# Patient Record
Sex: FEMALE | Race: WHITE | Employment: UNEMPLOYED | URBAN - METROPOLITAN AREA
[De-identification: names, ages, dates, MRNs, and addresses within clinical notes are randomized per-mention and may not be internally consistent; named-entity substitution may affect disease eponyms.]

---

## 2017-03-23 ENCOUNTER — HOSPITAL ENCOUNTER (INPATIENT)
Age: 27
LOS: 4 days | Discharge: HOME | DRG: 282 | End: 2017-03-30
Attending: EMERGENCY MEDICINE | Admitting: FAMILY MEDICINE
Payer: MEDICAID

## 2017-03-23 ENCOUNTER — APPOINTMENT (OUTPATIENT)
Dept: CT IMAGING | Age: 27
DRG: 282 | End: 2017-03-23
Attending: NURSE PRACTITIONER
Payer: MEDICAID

## 2017-03-23 DIAGNOSIS — K85.90 ACUTE PANCREATITIS, UNSPECIFIED COMPLICATION STATUS, UNSPECIFIED PANCREATITIS TYPE: Primary | ICD-10-CM

## 2017-03-23 DIAGNOSIS — R11.2 INTRACTABLE VOMITING WITH NAUSEA, UNSPECIFIED VOMITING TYPE: ICD-10-CM

## 2017-03-23 LAB
ALBUMIN SERPL BCP-MCNC: 4.6 G/DL (ref 3.4–5)
ALBUMIN/GLOB SERPL: 1.2 {RATIO} (ref 0.8–1.7)
ALP SERPL-CCNC: 137 U/L (ref 45–117)
ALT SERPL-CCNC: 27 U/L (ref 13–56)
AMORPH CRY URNS QL MICRO: ABNORMAL
ANION GAP BLD CALC-SCNC: 9 MMOL/L (ref 3–18)
APPEARANCE UR: ABNORMAL
AST SERPL W P-5'-P-CCNC: 16 U/L (ref 15–37)
BACTERIA URNS QL MICRO: ABNORMAL /HPF
BASOPHILS # BLD AUTO: 0 K/UL (ref 0–0.06)
BASOPHILS # BLD: 0 % (ref 0–2)
BILIRUB SERPL-MCNC: 0.4 MG/DL (ref 0.2–1)
BILIRUB UR QL: NEGATIVE
BUN SERPL-MCNC: 14 MG/DL (ref 7–18)
BUN/CREAT SERPL: 18 (ref 12–20)
CALCIUM SERPL-MCNC: 9.5 MG/DL (ref 8.5–10.1)
CHLORIDE SERPL-SCNC: 107 MMOL/L (ref 100–108)
CO2 SERPL-SCNC: 25 MMOL/L (ref 21–32)
COLOR UR: YELLOW
CREAT SERPL-MCNC: 0.79 MG/DL (ref 0.6–1.3)
DIFFERENTIAL METHOD BLD: ABNORMAL
EOSINOPHIL # BLD: 0 K/UL (ref 0–0.4)
EOSINOPHIL NFR BLD: 0 % (ref 0–5)
EPITH CASTS URNS QL MICRO: ABNORMAL /LPF (ref 0–5)
ERYTHROCYTE [DISTWIDTH] IN BLOOD BY AUTOMATED COUNT: 12.4 % (ref 11.6–14.5)
GLOBULIN SER CALC-MCNC: 3.9 G/DL (ref 2–4)
GLUCOSE SERPL-MCNC: 97 MG/DL (ref 74–99)
GLUCOSE UR STRIP.AUTO-MCNC: NEGATIVE MG/DL
HCG UR QL: NEGATIVE
HCT VFR BLD AUTO: 42.2 % (ref 35–45)
HGB BLD-MCNC: 14.6 G/DL (ref 12–16)
HGB UR QL STRIP: ABNORMAL
KETONES UR QL STRIP.AUTO: ABNORMAL MG/DL
LEUKOCYTE ESTERASE UR QL STRIP.AUTO: NEGATIVE
LIPASE SERPL-CCNC: 756 U/L (ref 73–393)
LYMPHOCYTES # BLD AUTO: 11 % (ref 21–52)
LYMPHOCYTES # BLD: 1.4 K/UL (ref 0.9–3.6)
MCH RBC QN AUTO: 32.7 PG (ref 24–34)
MCHC RBC AUTO-ENTMCNC: 34.6 G/DL (ref 31–37)
MCV RBC AUTO: 94.6 FL (ref 74–97)
MONOCYTES # BLD: 0.5 K/UL (ref 0.05–1.2)
MONOCYTES NFR BLD AUTO: 4 % (ref 3–10)
NEUTS SEG # BLD: 10.9 K/UL (ref 1.8–8)
NEUTS SEG NFR BLD AUTO: 85 % (ref 40–73)
NITRITE UR QL STRIP.AUTO: NEGATIVE
PH UR STRIP: 5.5 [PH] (ref 5–8)
PLATELET # BLD AUTO: 240 K/UL (ref 135–420)
PMV BLD AUTO: 10.4 FL (ref 9.2–11.8)
POTASSIUM SERPL-SCNC: 3.8 MMOL/L (ref 3.5–5.5)
PROT SERPL-MCNC: 8.5 G/DL (ref 6.4–8.2)
PROT UR STRIP-MCNC: 30 MG/DL
RBC # BLD AUTO: 4.46 M/UL (ref 4.2–5.3)
RBC #/AREA URNS HPF: ABNORMAL /HPF (ref 0–5)
SODIUM SERPL-SCNC: 141 MMOL/L (ref 136–145)
SP GR UR REFRACTOMETRY: 1.02 (ref 1–1.03)
UROBILINOGEN UR QL STRIP.AUTO: 0.2 EU/DL (ref 0.2–1)
WBC # BLD AUTO: 12.8 K/UL (ref 4.6–13.2)
WBC URNS QL MICRO: ABNORMAL /HPF (ref 0–4)

## 2017-03-23 PROCEDURE — 81025 URINE PREGNANCY TEST: CPT | Performed by: NURSE PRACTITIONER

## 2017-03-23 PROCEDURE — 83690 ASSAY OF LIPASE: CPT | Performed by: PHYSICIAN ASSISTANT

## 2017-03-23 PROCEDURE — 85025 COMPLETE CBC W/AUTO DIFF WBC: CPT | Performed by: PHYSICIAN ASSISTANT

## 2017-03-23 PROCEDURE — 81001 URINALYSIS AUTO W/SCOPE: CPT | Performed by: NURSE PRACTITIONER

## 2017-03-23 PROCEDURE — 74177 CT ABD & PELVIS W/CONTRAST: CPT

## 2017-03-23 PROCEDURE — 80053 COMPREHEN METABOLIC PANEL: CPT | Performed by: PHYSICIAN ASSISTANT

## 2017-03-23 PROCEDURE — 74011250636 HC RX REV CODE- 250/636: Performed by: PHYSICIAN ASSISTANT

## 2017-03-23 PROCEDURE — 74011250636 HC RX REV CODE- 250/636: Performed by: NURSE PRACTITIONER

## 2017-03-23 PROCEDURE — 96376 TX/PRO/DX INJ SAME DRUG ADON: CPT

## 2017-03-23 PROCEDURE — 96375 TX/PRO/DX INJ NEW DRUG ADDON: CPT

## 2017-03-23 PROCEDURE — 96361 HYDRATE IV INFUSION ADD-ON: CPT

## 2017-03-23 PROCEDURE — 99285 EMERGENCY DEPT VISIT HI MDM: CPT

## 2017-03-23 PROCEDURE — 96374 THER/PROPH/DIAG INJ IV PUSH: CPT

## 2017-03-23 PROCEDURE — 74011636320 HC RX REV CODE- 636/320: Performed by: EMERGENCY MEDICINE

## 2017-03-23 RX ORDER — KETOROLAC TROMETHAMINE 30 MG/ML
30 INJECTION, SOLUTION INTRAMUSCULAR; INTRAVENOUS
Status: COMPLETED | OUTPATIENT
Start: 2017-03-23 | End: 2017-03-23

## 2017-03-23 RX ORDER — ONDANSETRON 2 MG/ML
4 INJECTION INTRAMUSCULAR; INTRAVENOUS
Status: COMPLETED | OUTPATIENT
Start: 2017-03-23 | End: 2017-03-23

## 2017-03-23 RX ADMIN — IOPAMIDOL 100 ML: 612 INJECTION, SOLUTION INTRAVENOUS at 23:45

## 2017-03-23 RX ADMIN — ONDANSETRON HYDROCHLORIDE 4 MG: 2 SOLUTION INTRAMUSCULAR; INTRAVENOUS at 20:29

## 2017-03-23 RX ADMIN — KETOROLAC TROMETHAMINE 30 MG: 30 INJECTION, SOLUTION INTRAMUSCULAR at 21:34

## 2017-03-23 RX ADMIN — SODIUM CHLORIDE 1000 ML: 900 INJECTION, SOLUTION INTRAVENOUS at 20:29

## 2017-03-23 NOTE — IP AVS SNAPSHOT
Current Discharge Medication List  
  
START taking these medications Dose & Instructions Dispensing Information Comments Morning Noon Evening Bedtime  
 bisacodyl 10 mg suppository Commonly known as:  DULCOLAX Your last dose was: Your next dose is:    
   
   
 Dose:  10 mg Insert 10 mg into rectum daily as needed. Quantity:  30 Suppository Refills:  0  
     
   
   
   
  
 dicyclomine 10 mg capsule Commonly known as:  BENTYL Your last dose was: Your next dose is:    
   
   
 Dose:  10 mg Take 1 Cap by mouth four (4) times daily. Quantity:  90 Cap Refills:  0  
     
   
   
   
  
 docusate sodium 100 mg capsule Commonly known as:  Shasha Stallion Your last dose was: Your next dose is:    
   
   
 Dose:  100 mg Take 1 Cap by mouth two (2) times daily as needed for Constipation for up to 90 days. Quantity:  30 Cap Refills:  0  
     
   
   
   
  
 naloxegol 25 mg Tab tablet Commonly known as:  Edman Mery Your last dose was: Your next dose is:    
   
   
 Dose:  25 mg Take 1 Tab by mouth Daily (before breakfast). Quantity:  30 Tab Refills:  0  
     
   
   
   
  
 pantoprazole 40 mg tablet Commonly known as:  PROTONIX Your last dose was: Your next dose is:    
   
   
 Dose:  40 mg Take 1 Tab by mouth two (2) times a day. Quantity:  60 Tab Refills:  0  
     
   
   
   
  
 polyethylene glycol 17 gram packet Commonly known as:  Taisha Pile Your last dose was: Your next dose is:    
   
   
 Dose:  17 g Take 1 Packet by mouth three (3) times daily. Quantity:  90 Packet Refills:  0 CONTINUE these medications which have CHANGED Dose & Instructions Dispensing Information Comments Morning Noon Evening Bedtime HYDROcodone-acetaminophen 5-325 mg per tablet Commonly known as:  Energy Hurt What changed:   
- how much to take - how to take this - when to take this 
- reasons to take this 
- additional instructions - Another medication with the same name was removed. Continue taking this medication, and follow the directions you see here. Your last dose was: Your next dose is:    
   
   
 Dose:  1 Tab Take 1 Tab by mouth every six (6) hours as needed. Max Daily Amount: 4 Tabs. Quantity:  16 Tab Refills:  0 CONTINUE these medications which have NOT CHANGED Dose & Instructions Dispensing Information Comments Morning Noon Evening Bedtime  
 ondansetron hcl 4 mg tablet Commonly known as:  ZOFRAN (AS HYDROCHLORIDE) Your last dose was: Your next dose is:    
   
   
 Dose:  4 mg Take 1 tablet by mouth every eight (8) hours as needed for Nausea. Quantity:  12 tablet Refills:  0 PAXIL 30 mg tablet Generic drug:  PARoxetine Your last dose was: Your next dose is:    
   
   
 Dose:  30 mg Take 30 mg by mouth daily. Refills:  0 STOP taking these medications   
 methocarbamol 500 mg tablet Commonly known as:  ROBAXIN  
   
  
 ondansetron 4 mg disintegrating tablet Commonly known as:  ZOFRAN ODT  
   
  
 oxazepam 30 mg capsule Commonly known as:  SERAX Where to Get Your Medications These medications were sent to [address], [address] [place] [address] Phone:  [phone]  
  bisacodyl 10 mg suppository Information on where to get these meds will be given to you by the nurse or doctor. ! Ask your nurse or doctor about these medications  
  dicyclomine 10 mg capsule  
 docusate sodium 100 mg capsule HYDROcodone-acetaminophen 5-325 mg per tablet  
 naloxegol 25 mg Tab tablet  
 pantoprazole 40 mg tablet  
 polyethylene glycol 17 gram packet

## 2017-03-23 NOTE — IP AVS SNAPSHOT
Jarredon Flow 
 
 
 [address] Patient: James Smith MRN: [id number] TUA:[birth year] You are allergic to the following Allergen Reactions Latex, Natural Rubber Rash Doxycycline Hives Recent Documentation Height Weight Breastfeeding? BMI OB Status Smoking Status 1.422 m 52.2 kg No 25.78 kg/m2 Having regular periods Never Smoker Unresulted Labs Order Current Status PORPHOBILINOGEN DEAMINASE In process Emergency Contacts Name Discharge Info Relation Home Work Mobile Loreta Florez DISCHARGE CAREGIVER [3] Mother [14] [phone] About your hospitalization You were admitted on:  March 24, 2017 You last received care in the:  [facility] [address] You were discharged on:  March 30, 2017 Unit phone number:  [phone] Why you were hospitalized Your primary diagnosis was:  Not on File Your diagnoses also included:  Pancreatitis, Acute, Abdominal Pain, Acute, Generalized Providers Seen During Your Hospitalizations Provider Role Specialty Primary office phone Cherelle Guillen DO Attending Provider Emergency Medicine [phone] Roberto Garcia MD Attending Provider General acute hospital [phone] Your Primary Care Physician (PCP) Primary Care Physician Office Phone Office Fax NONE ** None ** ** None ** Follow-up Information Follow up With Details Comments Contact Info None   None (395) Patient stated that they have no PCP Roberto Garcia MD On 4/6/2017 Appointment at 11:20 am [address] [address] 
[phone] Mariya Knutson RN On 4/20/2017 Appointment at 10:20 am  at the [place] [address] 
[phone] Current Discharge Medication List  
  
 START taking these medications Dose & Instructions Dispensing Information Comments Morning Noon Evening Bedtime  
 bisacodyl 10 mg suppository Commonly known as:  DULCOLAX Your last dose was: Your next dose is:    
   
   
 Dose:  10 mg Insert 10 mg into rectum daily as needed. Quantity:  30 Suppository Refills:  0  
     
   
   
   
  
 dicyclomine 10 mg capsule Commonly known as:  BENTYL Your last dose was: Your next dose is:    
   
   
 Dose:  10 mg Take 1 Cap by mouth four (4) times daily. Quantity:  90 Cap Refills:  0  
     
   
   
   
  
 docusate sodium 100 mg capsule Commonly known as:  Gemma Batters Your last dose was: Your next dose is:    
   
   
 Dose:  100 mg Take 1 Cap by mouth two (2) times daily as needed for Constipation for up to 90 days. Quantity:  30 Cap Refills:  0  
     
   
   
   
  
 naloxegol 25 mg Tab tablet Commonly known as:  Maxcine Nephew Your last dose was: Your next dose is:    
   
   
 Dose:  25 mg Take 1 Tab by mouth Daily (before breakfast). Quantity:  30 Tab Refills:  0  
     
   
   
   
  
 pantoprazole 40 mg tablet Commonly known as:  PROTONIX Your last dose was: Your next dose is:    
   
   
 Dose:  40 mg Take 1 Tab by mouth two (2) times a day. Quantity:  60 Tab Refills:  0  
     
   
   
   
  
 polyethylene glycol 17 gram packet Commonly known as:  Ulysses Ro Your last dose was: Your next dose is:    
   
   
 Dose:  17 g Take 1 Packet by mouth three (3) times daily. Quantity:  90 Packet Refills:  0 CONTINUE these medications which have CHANGED Dose & Instructions Dispensing Information Comments Morning Noon Evening Bedtime HYDROcodone-acetaminophen 5-325 mg per tablet Commonly known as:  Zora Carson What changed:   
- how much to take 
- how to take this - when to take this - reasons to take this 
- additional instructions - Another medication with the same name was removed. Continue taking this medication, and follow the directions you see here. Your last dose was: Your next dose is:    
   
   
 Dose:  1 Tab Take 1 Tab by mouth every six (6) hours as needed. Max Daily Amount: 4 Tabs. Quantity:  16 Tab Refills:  0 CONTINUE these medications which have NOT CHANGED Dose & Instructions Dispensing Information Comments Morning Noon Evening Bedtime  
 ondansetron hcl 4 mg tablet Commonly known as:  ZOFRAN (AS HYDROCHLORIDE) Your last dose was: Your next dose is:    
   
   
 Dose:  4 mg Take 1 tablet by mouth every eight (8) hours as needed for Nausea. Quantity:  12 tablet Refills:  0 PAXIL 30 mg tablet Generic drug:  PARoxetine Your last dose was: Your next dose is:    
   
   
 Dose:  30 mg Take 30 mg by mouth daily. Refills:  0 STOP taking these medications   
 methocarbamol 500 mg tablet Commonly known as:  ROBAXIN  
   
  
 ondansetron 4 mg disintegrating tablet Commonly known as:  ZOFRAN ODT  
   
  
 oxazepam 30 mg capsule Commonly known as:  SERAX Where to Get Your Medications These medications were sent to [address] [place] [address], [address] [place] [address] Phone:  [phone]  
  bisacodyl 10 mg suppository Information on where to get these meds will be given to you by the nurse or doctor. ! Ask your nurse or doctor about these medications  
  dicyclomine 10 mg capsule  
 docusate sodium 100 mg capsule HYDROcodone-acetaminophen 5-325 mg per tablet  
 naloxegol 25 mg Tab tablet  
 pantoprazole 40 mg tablet  
 polyethylene glycol 17 gram packet Discharge Instructions None Discharge Orders None [employer] Announcement We are excited to announce that we are making your provider's discharge notes available to you in [employer]. You will see these notes when they are completed and signed by the physician that discharged you from your recent hospital stay. If you have any questions or concerns about any information you see in [employer], please call the Health Information Department where you were seen or reach out to your Primary Care Provider for more information about your plan of care. Introducing [facility]! [facility] introduces [employer] patient portal. Now you can access parts of your medical record, email your doctor's office, and request medication refills online. 1. In your internet browser, go to [email or web address] 2. Click on the First Time User? Click Here link in the Sign In box. You will see the New Member Sign Up page. 3. Enter your [employer] Access Code exactly as it appears below. You will not need to use this code after youve completed the sign-up process. If you do not sign up before the expiration date, you must request a new code. · [employer] Access Code: [email or web address] Expires: 5/31/2017  4:10 PM 
 
4. Enter the last four digits of your Social Security Number (xxxx) and Date of Birth (mm/dd/yyyy) as indicated and click Submit. You will be taken to the next sign-up page. 5. Create a [employer] ID. This will be your [employer] login ID and cannot be changed, so think of one that is secure and easy to remember. 6. Create a [employer] password. You can change your password at any time. 7. Enter your Password Reset Question and Answer. This can be used at a later time if you forget your password. 8. Enter your e-mail address. You will receive e-mail notification when new information is available in [address] 9. Click Sign Up. You can now view and download portions of your medical record. 10. Click the Download Summary menu link to download a portable copy of your medical information. If you have questions, please visit the Frequently Asked Questions section of the [employer] website. Remember, MyChart is NOT to be used for urgent needs. For medical emergencies, dial 911. Now available from your iPhone and Android! General Information Please provide this summary of care documentation to your next provider. Patient Signature:  ____________________________________________________________ Date:  ____________________________________________________________  
  
Bentley Miguel Provider Signature:  ____________________________________________________________ Date:  ____________________________________________________________

## 2017-03-23 NOTE — ED PROVIDER NOTES
HPI Comments: 1:26 PM Ane Record is a 23 y.o. female with a history of Bipolar 1 disorder who presents to the emergency department c/o nausea and vomiting x 20 since waking up this morning. Pt also c/o RLQ abdominal pain that radiates to the right flank. She states that she \"gets really hot then really cold. \" Pt also c/o vaginal spotting but states that it is because she is due for Depovera shot. She denies any dysuria, frequency, vaginal discharge, diarrhea, or fever associated with her symptoms. She explains that she has a hx of ovarian stones. Pt reports past surgival hx of Majo removal from abdomen and  section. She states that she is allergic to Doxycycline and Latex. Pt admits to occasional tobacco use and alcohol use. She denies any illicit drug use. The patient reports no other symptoms and has no other concerns at this time. PCP: Edilberto Escobedo MD    The history is provided by the patient. Past Medical History:   Diagnosis Date    Bipolar 1 disorder, depressed ([facility] [address]     Neurological disorder     Psychiatric disorder        No past surgical history on file. No family history on file. Social History     Social History    Marital status: SINGLE     Spouse name: N/A    Number of children: N/A    Years of education: N/A     Occupational History    Not on file. Social History Main Topics    Smoking status: Never Smoker    Smokeless tobacco: Not on file    Alcohol use 0.5 oz/week     1 drink(s) per week    Drug use: No    Sexual activity: Yes     Partners: Male     Birth control/ protection: None     Other Topics Concern    Not on file     Social History Narrative         ALLERGIES: Latex, natural rubber and Doxycycline    Review of Systems   Constitutional: Positive for activity change, appetite change and chills. Negative for fever. HENT: Negative. Negative for congestion, ear pain and sore throat. Respiratory: Negative.   Negative for cough, chest tightness, shortness of breath, wheezing and stridor. Cardiovascular: Negative. Negative for chest pain and palpitations. Gastrointestinal: Positive for abdominal pain (RLQ), nausea and vomiting (x 20). Negative for abdominal distention, constipation and diarrhea. Genitourinary: Positive for flank pain and vaginal bleeding (spotting). Negative for difficulty urinating, dysuria, frequency, hematuria, menstrual problem, pelvic pain, urgency and vaginal discharge. Musculoskeletal: Positive for back pain (lumbar). Neurological: Negative. Negative for dizziness, weakness and light-headedness. All other systems reviewed and are negative. Vitals:    03/23/17 1720   BP: 111/72   Pulse: 86   Resp: 16   Temp: 97.6 °F (36.4 °C)   SpO2: 99%   Weight: 52.2 kg (115 lb)   Height: 4' 8\" (1.422 m)            Physical Exam   Constitutional: She is oriented to person, place, and time. She appears well-developed and well-nourished. Pt uncomfortable   HENT:   Head: Normocephalic and atraumatic. Mouth/Throat: Oropharynx is clear and moist. No oropharyngeal exudate. Eyes: Conjunctivae and EOM are normal. Pupils are equal, round, and reactive to light. Neck: Normal range of motion. Neck supple. Cardiovascular: Normal rate, regular rhythm and normal heart sounds. Pulmonary/Chest: Effort normal and breath sounds normal. No respiratory distress. She has no wheezes. She has no rales. She exhibits no tenderness. Abdominal: Soft. Bowel sounds are normal. She exhibits no distension. There is tenderness. There is guarding. RLQ tenderness to direct palpation with guarding    After patient returned from CT- upon recheck, pt is having some LUQ pain now with mild tenderness   Musculoskeletal: Normal range of motion. She exhibits no edema, tenderness or deformity. Neurological: She is alert and oriented to person, place, and time. She exhibits normal muscle tone. Coordination normal.   Skin: Skin is warm and dry. She is not diaphoretic. Psychiatric: She has a normal mood and affect. Her behavior is normal. Thought content normal.   Nursing note and vitals reviewed. MDM  Number of Diagnoses or Management Options  Acute pancreatitis, unspecified complication status, unspecified pancreatitis type: Intractable vomiting with nausea, unspecified vomiting type:   Diagnosis management comments: Pt with elevated lipase-denies any history of previous pancreatitis or current upper abdominal pain. Mild elevated alk phos at 137 is chronic and all other LFTs normal. White count mildly elevated. Will CT pt to eval for appendicitis, uropathy, pancreatitis, CBD obstruction. Ct essentially normal. Neg Landry's sign. Impression-Pancreatitis    Will discuss admit with hospitalist due to 1st episode, unsuccessful fluid challenge,  lipase level, and vomiting\" more than 20 times today\". Dicussed admission with Dr. Flor Carson, patient previously established with Dr. Cathie Hammer, so admission deferred to Dr. Burgess Cerna who subsequently deferred by to Dr. Cathie Hammer who now admits for himself           Amount and/or Complexity of Data Reviewed  Clinical lab tests: ordered and reviewed  Tests in the radiology section of CPT®: ordered and reviewed    Patient Progress  Patient progress: stable    ED Course       Procedures                       Scribe Attestation:     IJessi, scribing for and in the presence of Kim Cuellar. Elton Osorio NP March 23, 2017 at 7:52 PM     Signed by: Elda Wu, March 23, 2017, 7:52 PM    Physician Attestation:   I personally performed the services described in this documentation, reviewed and edited the documentation which was dictated to the scribe in my presence, and it accurately records my words and actions. [facility]  Elton Osorio NP  March 23, 2017 at 7:52 PM

## 2017-03-23 NOTE — ED TRIAGE NOTES
States that she was out drinking with her friends and all day today she has been vomiting with abdominal pain and chills.

## 2017-03-24 ENCOUNTER — APPOINTMENT (OUTPATIENT)
Dept: ULTRASOUND IMAGING | Age: 27
DRG: 282 | End: 2017-03-24
Attending: FAMILY MEDICINE
Payer: MEDICAID

## 2017-03-24 ENCOUNTER — APPOINTMENT (OUTPATIENT)
Dept: GENERAL RADIOLOGY | Age: 27
DRG: 282 | End: 2017-03-24
Attending: FAMILY MEDICINE
Payer: MEDICAID

## 2017-03-24 PROBLEM — R10.84 ABDOMINAL PAIN, ACUTE, GENERALIZED: Status: ACTIVE | Noted: 2017-03-24

## 2017-03-24 PROBLEM — K85.90 PANCREATITIS, ACUTE: Status: ACTIVE | Noted: 2017-03-24

## 2017-03-24 LAB
AMPHET UR QL SCN: NEGATIVE
BARBITURATES UR QL SCN: NEGATIVE
BENZODIAZ UR QL: NEGATIVE
CANNABINOIDS UR QL SCN: POSITIVE
COCAINE UR QL SCN: NEGATIVE
HDSCOM,HDSCOM: ABNORMAL
METHADONE UR QL: NEGATIVE
OPIATES UR QL: POSITIVE
PCP UR QL: NEGATIVE
TRIGL SERPL-MCNC: 106 MG/DL (ref ?–150)

## 2017-03-24 PROCEDURE — 74011250637 HC RX REV CODE- 250/637: Performed by: FAMILY MEDICINE

## 2017-03-24 PROCEDURE — 71010 XR CHEST SNGL V: CPT

## 2017-03-24 PROCEDURE — C9113 INJ PANTOPRAZOLE SODIUM, VIA: HCPCS | Performed by: INTERNAL MEDICINE

## 2017-03-24 PROCEDURE — C9113 INJ PANTOPRAZOLE SODIUM, VIA: HCPCS | Performed by: FAMILY MEDICINE

## 2017-03-24 PROCEDURE — 99218 HC RM OBSERVATION: CPT

## 2017-03-24 PROCEDURE — 87491 CHLMYD TRACH DNA AMP PROBE: CPT | Performed by: FAMILY MEDICINE

## 2017-03-24 PROCEDURE — 84478 ASSAY OF TRIGLYCERIDES: CPT | Performed by: INTERNAL MEDICINE

## 2017-03-24 PROCEDURE — 74011250636 HC RX REV CODE- 250/636: Performed by: INTERNAL MEDICINE

## 2017-03-24 PROCEDURE — 36415 COLL VENOUS BLD VENIPUNCTURE: CPT | Performed by: FAMILY MEDICINE

## 2017-03-24 PROCEDURE — 74011250637 HC RX REV CODE- 250/637: Performed by: INTERNAL MEDICINE

## 2017-03-24 PROCEDURE — 80307 DRUG TEST PRSMV CHEM ANLYZR: CPT | Performed by: NURSE PRACTITIONER

## 2017-03-24 PROCEDURE — 84080 ASSAY ALKALINE PHOSPHATASES: CPT | Performed by: INTERNAL MEDICINE

## 2017-03-24 PROCEDURE — 76830 TRANSVAGINAL US NON-OB: CPT

## 2017-03-24 PROCEDURE — 82787 IGG 1 2 3 OR 4 EACH: CPT | Performed by: INTERNAL MEDICINE

## 2017-03-24 PROCEDURE — 74011250636 HC RX REV CODE- 250/636: Performed by: NURSE PRACTITIONER

## 2017-03-24 PROCEDURE — 87040 BLOOD CULTURE FOR BACTERIA: CPT | Performed by: FAMILY MEDICINE

## 2017-03-24 PROCEDURE — 74011250636 HC RX REV CODE- 250/636: Performed by: FAMILY MEDICINE

## 2017-03-24 PROCEDURE — 82977 ASSAY OF GGT: CPT | Performed by: INTERNAL MEDICINE

## 2017-03-24 PROCEDURE — 76705 ECHO EXAM OF ABDOMEN: CPT

## 2017-03-24 RX ORDER — FACIAL-BODY WIPES
10 EACH TOPICAL DAILY PRN
Status: DISCONTINUED | OUTPATIENT
Start: 2017-03-24 | End: 2017-03-30 | Stop reason: HOSPADM

## 2017-03-24 RX ORDER — ONDANSETRON 2 MG/ML
4 INJECTION INTRAMUSCULAR; INTRAVENOUS
Status: COMPLETED | OUTPATIENT
Start: 2017-03-24 | End: 2017-03-24

## 2017-03-24 RX ORDER — PANTOPRAZOLE SODIUM 40 MG/10ML
40 INJECTION, POWDER, LYOPHILIZED, FOR SOLUTION INTRAVENOUS DAILY
Status: DISCONTINUED | OUTPATIENT
Start: 2017-03-24 | End: 2017-03-24

## 2017-03-24 RX ORDER — DOCUSATE SODIUM 100 MG/1
100 CAPSULE, LIQUID FILLED ORAL 2 TIMES DAILY
Status: DISCONTINUED | OUTPATIENT
Start: 2017-03-24 | End: 2017-03-30 | Stop reason: HOSPADM

## 2017-03-24 RX ORDER — ONDANSETRON 2 MG/ML
4 INJECTION INTRAMUSCULAR; INTRAVENOUS
Status: DISCONTINUED | OUTPATIENT
Start: 2017-03-24 | End: 2017-03-30 | Stop reason: HOSPADM

## 2017-03-24 RX ORDER — MORPHINE SULFATE 2 MG/ML
2 INJECTION, SOLUTION INTRAMUSCULAR; INTRAVENOUS
Status: DISCONTINUED | OUTPATIENT
Start: 2017-03-24 | End: 2017-03-25

## 2017-03-24 RX ORDER — PANTOPRAZOLE SODIUM 40 MG/10ML
40 INJECTION, POWDER, LYOPHILIZED, FOR SOLUTION INTRAVENOUS EVERY 12 HOURS
Status: DISCONTINUED | OUTPATIENT
Start: 2017-03-24 | End: 2017-03-30 | Stop reason: HOSPADM

## 2017-03-24 RX ORDER — MORPHINE SULFATE 4 MG/ML
4 INJECTION, SOLUTION INTRAMUSCULAR; INTRAVENOUS
Status: COMPLETED | OUTPATIENT
Start: 2017-03-24 | End: 2017-03-24

## 2017-03-24 RX ORDER — POLYETHYLENE GLYCOL 3350 17 G/17G
17 POWDER, FOR SOLUTION ORAL 3 TIMES DAILY
Status: DISCONTINUED | OUTPATIENT
Start: 2017-03-24 | End: 2017-03-30 | Stop reason: HOSPADM

## 2017-03-24 RX ORDER — SODIUM CHLORIDE 9 MG/ML
75 INJECTION, SOLUTION INTRAVENOUS CONTINUOUS
Status: DISCONTINUED | OUTPATIENT
Start: 2017-03-24 | End: 2017-03-30

## 2017-03-24 RX ORDER — ENOXAPARIN SODIUM 100 MG/ML
40 INJECTION SUBCUTANEOUS EVERY 24 HOURS
Status: DISCONTINUED | OUTPATIENT
Start: 2017-03-24 | End: 2017-03-30 | Stop reason: HOSPADM

## 2017-03-24 RX ADMIN — Medication 2 MG: at 11:19

## 2017-03-24 RX ADMIN — Medication 2 MG: at 19:07

## 2017-03-24 RX ADMIN — SODIUM CHLORIDE 125 ML/HR: 900 INJECTION, SOLUTION INTRAVENOUS at 03:07

## 2017-03-24 RX ADMIN — ONDANSETRON HYDROCHLORIDE 4 MG: 2 SOLUTION INTRAMUSCULAR; INTRAVENOUS at 01:46

## 2017-03-24 RX ADMIN — NALOXEGOL OXALATE 25 MG: 25 TABLET, FILM COATED ORAL at 11:17

## 2017-03-24 RX ADMIN — DOCUSATE SODIUM 100 MG: 100 CAPSULE, LIQUID FILLED ORAL at 19:06

## 2017-03-24 RX ADMIN — Medication 4 MG: at 01:46

## 2017-03-24 RX ADMIN — SODIUM CHLORIDE 125 ML/HR: 900 INJECTION, SOLUTION INTRAVENOUS at 22:57

## 2017-03-24 RX ADMIN — PANTOPRAZOLE SODIUM 40 MG: 40 INJECTION, POWDER, FOR SOLUTION INTRAVENOUS at 11:17

## 2017-03-24 RX ADMIN — SODIUM CHLORIDE 125 ML/HR: 900 INJECTION, SOLUTION INTRAVENOUS at 15:20

## 2017-03-24 RX ADMIN — PANTOPRAZOLE SODIUM 40 MG: 40 INJECTION, POWDER, FOR SOLUTION INTRAVENOUS at 22:58

## 2017-03-24 RX ADMIN — ONDANSETRON HYDROCHLORIDE 4 MG: 2 SOLUTION INTRAMUSCULAR; INTRAVENOUS at 04:23

## 2017-03-24 RX ADMIN — Medication 4 MG: at 04:23

## 2017-03-24 RX ADMIN — Medication 2 MG: at 15:21

## 2017-03-24 RX ADMIN — ONDANSETRON HYDROCHLORIDE 4 MG: 2 SOLUTION INTRAMUSCULAR; INTRAVENOUS at 11:19

## 2017-03-24 RX ADMIN — Medication 2 MG: at 23:35

## 2017-03-24 RX ADMIN — ENOXAPARIN SODIUM 40 MG: 40 INJECTION SUBCUTANEOUS at 11:18

## 2017-03-24 RX ADMIN — DOCUSATE SODIUM 100 MG: 100 CAPSULE, LIQUID FILLED ORAL at 11:19

## 2017-03-24 RX ADMIN — POLYETHYLENE GLYCOL 3350 17 G: 17 POWDER, FOR SOLUTION ORAL at 19:06

## 2017-03-24 NOTE — PROGRESS NOTES
Care Management Interventions  Current Support Network: Other (Pt currently resides at a hotel with her family.)  Confirm Follow Up Transport: Family  Plan discussed with Pt/Family/Caregiver: Yes     Pt is a 23year old female admitted for pancreatitis, intractable vomiting. Pt is alert and oriented and alone in room. Pt states that currently her and family resides at a hotel at [address] until April 1st when they will be moving into a home at [address] Pt's plan is to return to hotel at discharge and her mother will be assisting her with transportation. Pt indicates being fully independent with ADLs and ambulation prior to admission.

## 2017-03-24 NOTE — ED NOTES
Hourly rounding-Pt resting on stretcher, side rails up, call bell in reach, vitals stable, pt updated on plan of care, pt sleeping when i entered room, stated her pain was still 8/10 when asked.

## 2017-03-24 NOTE — ED NOTES
Received report from [address], RN, pt moved from fast track to bed 5. Pt alert and oriented, c/o abdominal pain 8/10, pt states she has been vomiting since yesterday, denies any other symptoms.

## 2017-03-24 NOTE — ED NOTES
Hourly rounding-Pt resting on stretcher, side rails up, call bell in reach, vitals stable, pt updated on plan of care, no issues or complaints at this time.  Pt ambulated to bathroom without difficulty

## 2017-03-24 NOTE — INTERDISCIPLINARY ROUNDS
Interdisciplinary Round Note   Patient Information: Patient Information: Cris Richards                                      [birth year]   Reason for Admission: pancreatitis, intractable vomiting   Attending Provider:   Cooper Osei MD  Primary Care Physician:       None       None   Past Medical History:   Past Medical History:   Diagnosis Date    Bipolar [facility] disorder, depressed [facility] [address]     Neurological disorder     Psychiatric disorder       Hospital day: 0  Estimated discharge date: 3/25  RRAT Score: Low Risk            8       Total Score        4 More than 1 Admission in calendar year    4 Patient Insurance is Medicare, Medicaid or Self Pay        Criteria that do not apply:    Relationship with PCP    Patient Living Status    Patient Length of Stay > 5    Charlson Comorbidity Score       Goals for Today: pain control      Overnight Events:n/a     VITAL SIGNS  Vitals:    03/24/17 0400 03/24/17 0500 03/24/17 0600 03/24/17 0751   BP: 139/70 114/61 100/60 95/57   Pulse:    75   Resp:    18   Temp:    97.7 °F (36.5 °C)   SpO2: 100% 100% 98% 93%   Weight:       Height:              Lines, Drains, & Airways    Peripheral IV 03/23/17 Right Antecubital-Site Assessment: Clean, dry, & intact       VTE Prophylaxis                                   Intake and Output:        Current Diet: DIET CLEAR LIQUID       Abdominal   Abdominal Assessment: Flat, Intact, Nausea, Tender, Semi-soft, Vomiting  Appetite: NPO  Bowel Sounds: Active   GI Prophylaxis: yes        Type: n/a        Recent Glucose Results:   Lab Results   Component Value Date/Time    GLU 97 03/23/2017 07:20 PM          IV Antibiotics? n/a       When started: n/a   Activity Level: Activity Level: Head of bed elevated (degrees)  Needs assistance with ADLs: no  PT Consult Status: n/a Current Immunizations: There is no immunization history on file for this patient.        Recommendations:   Discharge Disposition: Home Independent    Needs for Discharge: n/a Recommendations from IDR team: n/a    Other Notes: n/a

## 2017-03-24 NOTE — ROUTINE PROCESS
7:38; Received pt. A&O x 4. Pt C/O some nausea, no vomiting. Bowel sounds active. Abdomen soft a little distended     States she is tolerating her oral intake well. no distress at this time.

## 2017-03-24 NOTE — ED NOTES
TRANSFER - OUT REPORT:    Verbal report given to [address], RN(name) on Umer Nair  being transferred to (unit) for routine progression of care       Report consisted of patients Situation, Background, Assessment and   Recommendations(SBAR). Information from the following report(s) SBAR and ED Summary was reviewed with the receiving nurse. Lines:   Peripheral IV 03/23/17 Right Antecubital (Active)   Site Assessment Clean, dry, & intact 3/23/2017 10:36 PM   Phlebitis Assessment 0 3/23/2017 10:36 PM   Infiltration Assessment 0 3/23/2017 10:36 PM   Dressing Status Clean, dry, & intact 3/23/2017 10:36 PM   Dressing Type Transparent 3/23/2017 10:36 PM   Hub Color/Line Status Patent; Flushed 3/23/2017 10:36 PM        Opportunity for questions and clarification was provided.       Patient transported with:   [employer]

## 2017-03-24 NOTE — ROUTINE PROCESS
Bedside and Verbal shift change report given to [address] (oncoming nurse) by Carlin Godinez (offgoing nurse). Report included the following information SBAR, Kardex, OR Summary, Procedure Summary, Intake/Output, MAR, Recent Results and Med Rec Status.

## 2017-03-24 NOTE — H&P
History & Physical    Patient: Tammy Cedillo MRN: [id number]  CSN: [id number]    YOB: 1990  Age: 23 y.o. Sex: female      DOA: 3/23/2017    Chief Complaint:   Chief Complaint   Patient presents with    Abdominal Pain    Nausea    Vomiting          HPI:     Tammy Cedillo is a 23 y.o.  female who has h/o poor compliant last seen at my office 3 years ago . Pt presented to ER c/o nausea and vomiting x 20 since waking up this morning. Pt also c/o RLQ,  abdominal pain that radiates to the right flank. Pt also has Lt upper quadrant pain      Pt also c/o vaginal spotting but states that it is because she is due for Depovera shot. She has been f/[facility]     She denies any dysuria, frequency, vaginal discharge, diarrhea, or fever associated with her symptoms. Pt has surgival hx of Morina removal from abdomen and  section. She states that she is allergic to Doxycycline and Latex. Pt admits to occasional tobacco use and alcohol use. she reported she had one hard Drink 2 days ago     She denies any illicit drug use. The patient reports no other symptoms and has no other concerns at this time. CT scan abdomen and pelvis  Moderate amount of retained stool in proximal and mid colon.     Normal appendix identified.     No definable acute processes in abdomen or in pelvis.           Past Medical History:   Diagnosis Date    Bipolar 1 disorder, depressed ([facility] [address]     Neurological disorder     Psychiatric disorder        History reviewed. No pertinent surgical history. History reviewed. No pertinent family history.     Social History     Social History    Marital status: SINGLE     Spouse name: N/A    Number of children: N/A    Years of education: N/A     Social History Main Topics    Smoking status: Never Smoker    Smokeless tobacco: None    Alcohol use 0.5 oz/week     1 drink(s) per week    Drug use: No    Sexual activity: Yes     Partners: Male     Birth control/ protection: None     Other Topics Concern    None     Social History Narrative       Prior to Admission medications    Medication Sig Start Date End Date Taking? Authorizing Provider   HYDROcodone-acetaminophen (NORCO) 5-325 mg per tablet Take 1 Tab by mouth every four (4) hours as needed for Pain. Max Daily Amount: 6 Tabs. 10/23/15   SHANDA Damon   HYDROcodone-acetaminophen (NORCO) 5-325 mg per tablet Take 1-2 tablets PO every 4-6 hours as needed for pain control. If over the counter ibuprofen or acetaminophen was suggested, then only take the vicodin for pain not well controlled with the over the counter medication. 7/30/15   SHANDA Damon   methocarbamol (ROBAXIN) 500 mg tablet Take 1 Tab by mouth four (4) times daily. 7/30/15   SHANDA Murcia   ondansetron (ZOFRAN ODT) 4 mg disintegrating tablet 1-2 tabs every 6-8 hours prn 7/30/15   SHANDA Murcia   ondansetron hcl (ZOFRAN, AS HYDROCHLORIDE,) 4 mg tablet Take 1 tablet by mouth every eight (8) hours as needed for Nausea. 10/8/14   SHANDA Sharma   oxazepam (SERAX) 30 mg capsule Take 30 mg by mouth nightly as needed. Zeb Mena MD   PARoxetine (PAXIL) 30 mg tablet Take 30 mg by mouth daily. Zeb Mena MD       Allergies   Allergen Reactions    Latex, Natural Rubber Rash    Doxycycline Hives       Review of Systems  GENERAL: Patient alert, awake and oriented times 3, able to communicate full sentences and not in distress. HEENT: No change in vision, no earache, tinnitus, sore throat or sinus congestion. NECK: No pain or stiffness. CARDIOVASCULAR: No chest pain or pressure. No palpitations. PULMONARY: No shortness of breath, cough or wheeze. GASTROINTESTINAL: positive  abdominal pain, positive  nausea, vomiting no  diarrhea, melena or       bright red blood per rectum. GENITOURINARY: No urinary frequency, urgency, hesitancy or dysuria.  MUSCULOSKELETAL: No joint or muscle pain, positive  back pain, no recent trauma. DERMATOLOGIC: No rash, no itching, no lesions. ENDOCRINE: No polyuria, polydipsia, no heat or cold intolerance. No recent change in    weight. HEMATOLOGICAL: No anemia or easy bruising or bleeding. NEUROLOGIC: No headache, seizures, numbness, tingling or weakness. PSYCHIATRIC: No depression, anxiety, mood disorder, no loss of interest in normal       activity or change in sleep pattern. Physical Exam:     Physical Exam:  Visit Vitals    BP 95/57 (BP 1 Location: Left arm, BP Patient Position: At rest)    Pulse 75    Temp 97.7 °F (36.5 °C)    Resp 18    Ht 4' 8\" (1.422 m)    Wt 52.2 kg (115 lb)    SpO2 93%    BMI 25.78 kg/m2      O2 Device: Room air    Temp (24hrs), Av °F (36.7 °C), Min:97.6 °F (36.4 °C), Max:98.8 °F (37.1 °C)             General:  Alert, cooperative, no distress, appears stated age. Head:  Normocephalic, without obvious abnormality, atraumatic. Eyes:  Conjunctivae/corneas clear. PERRL, EOMs intact. Nose: Nares normal. No drainage or sinus tenderness. Throat: Lips, mucosa, and tongue dry   Neck: Supple, symmetrical, trachea midline, no adenopathy, thyroid: no enlargement/tenderness/nodules, no carotid bruit and no JVD. Back:   ROM normal. No CVA tenderness. Lungs:   Clear to auscultation bilaterally. Chest wall:  No tenderness or deformity. Heart:  Regular rate and rhythm, S1, S2 normal, no murmur, click, rub or gallop. Abdomen: Soft, tender Lt upper quadrant epigastric and Rt lower quadrant . Bowel sounds normal. No masses,  No organomegaly. Extremities: Extremities normal, atraumatic, no cyanosis or edema. Pulses: 2+ and symmetric all extremities. Skin: Skin color, texture, turgor normal. No rashes or lesions   Neurologic: CNII-XII intact. No focal motor or sensory deficit. Labs Reviewed: All lab results for the last 24 hours reviewed.   CT    Procedures/imaging: see electronic medical records for all procedures/Xrays and details which were not copied into this note but were reviewed prior to creation of Plan        Assessment/Plan     Active Problems:    Pancreatitis, acute (3/24/2017)      Abdominal pain, acute, generalized (3/24/2017)       Plan  Will admit for observation  Clear liquid diet  IV hydration with NS  Morphin 2 mg IV q 4h prn  Zofran 4 mg IV q 6h prn  Colace 100 mg BID  Movantik 25 mg daily  Dulcolax supp 10 mg supp daily prn    will check liver ultrasound and f/u GI consult  Check EKG, CXR UDS pending from ER  Will get urine GC/CT   Pelvic Ultrasound, repeat cbc, cmp, mag  tsh, lipase in AM  Blood culture x2    DVT/GI Prophylaxis: Lovenox, SCD's and H2B/PPI      Mauro Oneal MD  3/24/2017  7:56 AM

## 2017-03-24 NOTE — CONSULTS
[email or web address]  [phone]    Impression:   1. Acute pancreatitis -- suspect alcohol related, did admit to heavy ETOH use chronically  2. Chronic abdominal pain  3. Recurrent nausea and vomiting -- pancreatitis vs PUD vs biliary source vs marijuana hyperemesis syndrom  4. Abnormal GI imaging - transient small bowel dilation on prior CT scans  5. Chronic constipation   6. Family history of gallstones and \"bile duct problems\"  7. Pyrosis      Plan:     1. Serology for triglycerides, NATALIA, IgG4, alk phos isoenzymes and GGT  2. Await read on gallbladder ultrasound  3. Advance diet as tolerated  4. Consider small bowel series and/or MRCP as outpatient given prior imaging and family history  5. Continue IVF hydration until tolerating adequate oral intake  6. Analgesic and antiemetic therapy  7. Miralax TID      Chief Complaint: acute pancreatitis      HPI:  Abhinav Montero is a 23 y.o. female who is being seen on consult for several days of worsening abdominal pain, nausea and vomiting with elevated lipase in ED. Pt reports years of chronic abdominal pain of unknown etiology and recurrent nausea and vomiting, typically occurring after drinking alcohol, which she admits to consuming heavily on occasion. She denies any prior episode of pancreatitis. CT in ED with moderate retained stool, no signs of pancreatitis. Prior CT from 9/2013 with mildly dilated proximal small bowel loops without transition zone. Subsequent CT without contrast in 6/2014 with proximal jejunal thickening and mid-jejunal fluid and dilation, however repeat CT with contrast did not show any persistent abnormality. She has a long history of constipation with up to 7 days between bowel movements. She had an EGD and colonoscopy at age 16, per mom, which was reportedly normal. Her mother and grandmother have both had cholecystectomy for gallstones and \"bile duct problems. \"    PMH:   Past Medical History:   Diagnosis Date    Bipolar 1 disorder, depressed ([facility] [address]     Neurological disorder     Psychiatric disorder        PSH:   History reviewed. No pertinent surgical history. Social HX:   Social History     Social History    Marital status: SINGLE     Spouse name: N/A    Number of children: N/A    Years of education: N/A     Occupational History    Not on file. Social History Main Topics    Smoking status: Never Smoker    Smokeless tobacco: Not on file    Alcohol use 0.5 oz/week     1 drink(s) per week    Drug use: No    Sexual activity: Yes     Partners: Male     Birth control/ protection: None     Other Topics Concern    Not on file     Social History Narrative       FHX:   History reviewed. No pertinent family history. Allergy:   Allergies   Allergen Reactions    Latex, Natural Rubber Rash    Doxycycline Hives       Home Medications:     Prescriptions Prior to Admission   Medication Sig    ondansetron (ZOFRAN ODT) 4 mg disintegrating tablet 1-2 tabs every 6-8 hours prn    ondansetron hcl (ZOFRAN, AS HYDROCHLORIDE,) 4 mg tablet Take 1 tablet by mouth every eight (8) hours as needed for Nausea.  HYDROcodone-acetaminophen (NORCO) 5-325 mg per tablet Take 1 Tab by mouth every four (4) hours as needed for Pain. Max Daily Amount: 6 Tabs.  HYDROcodone-acetaminophen (NORCO) 5-325 mg per tablet Take 1-2 tablets PO every 4-6 hours as needed for pain control. If over the counter ibuprofen or acetaminophen was suggested, then only take the vicodin for pain not well controlled with the over the counter medication.  methocarbamol (ROBAXIN) 500 mg tablet Take 1 Tab by mouth four (4) times daily.  oxazepam (SERAX) 30 mg capsule Take 30 mg by mouth nightly as needed.  PARoxetine (PAXIL) 30 mg tablet Take 30 mg by mouth daily. Review of Systems:     A fourteen point review of systems was obtained, and was negative except per HPI.     Visit Vitals    /80 (BP 1 Location: Left arm, BP Patient Position: Supine)    Pulse 74    Temp 98.4 °F (36.9 °C)    Resp 18    Ht 4' 8\" (1.422 m)    Wt 52.2 kg (115 lb)    SpO2 99%    Breastfeeding No    BMI 25.78 kg/m2       Physical Assessment:     constitutional: appearance: well developed, well nourished, in no acute distress. skin: no rashes, jaundice  eyes: inspection: normal conjunctivae and lids; no scleral icterus pupils: equal, round, reactive to light; extraocular movements intact  ENMT: mouth: mucous membranes moist, no thrush  neck:  no masses or tenderness; normal range of motion  respiratory: breath sounds clear, no wheeze/rales/rhonchi  cardiovascular: normal rate, regular rhythm without murmur  abdominal: soft, bowel sounds present, non-tender to palpation without rebound or guarding; no appreciable mass; no appreciable hepatosplenomegaly; no appreciable fluid wave  extremities: no clubbing, cyanosis, edema  neurologic:  Cranial nerves II-XII grossly intact; no asterixis   psychiatric:  oriented to time, space and person. Basic Metabolic Profile   Recent Labs      03/23/17 1920   NA  141   K  3.8   CL  107   CO2  25   BUN  14   GLU  97   CA  9.5         CBC w/Diff    Recent Labs      03/23/17 1920   WBC  12.8   RBC  4.46   HGB  14.6   HCT  42.2   MCV  94.6   MCH  32.7   MCHC  34.6   RDW  12.4   PLT  240    Recent Labs      03/23/17 1920   GRANS  85*   LYMPH  11*   EOS  0        Hepatic Function   Recent Labs      03/23/17 1920   ALB  4.6   TP  8.5*   TBILI  0.4   SGOT  16   AP  137*   LPSE  756*          Elier Parekh MD  [facility] [address] - [phone]  [email or web address]. [facility]

## 2017-03-25 LAB
ALBUMIN SERPL BCP-MCNC: 3.2 G/DL (ref 3.4–5)
ALBUMIN/GLOB SERPL: 1.3 {RATIO} (ref 0.8–1.7)
ALP SERPL-CCNC: 96 U/L (ref 45–117)
ALT SERPL-CCNC: 18 U/L (ref 13–56)
ANION GAP BLD CALC-SCNC: 9 MMOL/L (ref 3–18)
AST SERPL W P-5'-P-CCNC: 14 U/L (ref 15–37)
BASOPHILS # BLD AUTO: 0 K/UL (ref 0–0.1)
BASOPHILS # BLD: 0 % (ref 0–2)
BILIRUB SERPL-MCNC: 0.5 MG/DL (ref 0.2–1)
BUN SERPL-MCNC: 5 MG/DL (ref 7–18)
BUN/CREAT SERPL: 8 (ref 12–20)
CALCIUM SERPL-MCNC: 8.1 MG/DL (ref 8.5–10.1)
CHLORIDE SERPL-SCNC: 106 MMOL/L (ref 100–108)
CO2 SERPL-SCNC: 26 MMOL/L (ref 21–32)
CREAT SERPL-MCNC: 0.65 MG/DL (ref 0.6–1.3)
DIFFERENTIAL METHOD BLD: ABNORMAL
EOSINOPHIL # BLD: 0 K/UL (ref 0–0.4)
EOSINOPHIL NFR BLD: 1 % (ref 0–5)
ERYTHROCYTE [DISTWIDTH] IN BLOOD BY AUTOMATED COUNT: 12.2 % (ref 11.6–14.5)
GLOBULIN SER CALC-MCNC: 2.5 G/DL (ref 2–4)
GLUCOSE SERPL-MCNC: 69 MG/DL (ref 74–99)
HCT VFR BLD AUTO: 34.9 % (ref 35–45)
HGB BLD-MCNC: 11.7 G/DL (ref 12–16)
LIPASE SERPL-CCNC: 194 U/L (ref 73–393)
LYMPHOCYTES # BLD AUTO: 47 % (ref 21–52)
LYMPHOCYTES # BLD: 3.2 K/UL (ref 0.9–3.6)
MAGNESIUM SERPL-MCNC: 1.9 MG/DL (ref 1.8–2.4)
MCH RBC QN AUTO: 32 PG (ref 24–34)
MCHC RBC AUTO-ENTMCNC: 33.5 G/DL (ref 31–37)
MCV RBC AUTO: 95.4 FL (ref 74–97)
MONOCYTES # BLD: 0.5 K/UL (ref 0.05–1.2)
MONOCYTES NFR BLD AUTO: 7 % (ref 3–10)
NEUTS SEG # BLD: 3 K/UL (ref 1.8–8)
NEUTS SEG NFR BLD AUTO: 45 % (ref 40–73)
PLATELET # BLD AUTO: 183 K/UL (ref 135–420)
PMV BLD AUTO: 10.8 FL (ref 9.2–11.8)
POTASSIUM SERPL-SCNC: 3.3 MMOL/L (ref 3.5–5.5)
PROT SERPL-MCNC: 5.7 G/DL (ref 6.4–8.2)
RBC # BLD AUTO: 3.66 M/UL (ref 4.2–5.3)
SODIUM SERPL-SCNC: 141 MMOL/L (ref 136–145)
TSH SERPL DL<=0.05 MIU/L-ACNC: 1.58 UIU/ML (ref 0.36–3.74)
WBC # BLD AUTO: 6.7 K/UL (ref 4.6–13.2)

## 2017-03-25 PROCEDURE — 74011250636 HC RX REV CODE- 250/636: Performed by: INTERNAL MEDICINE

## 2017-03-25 PROCEDURE — 85025 COMPLETE CBC W/AUTO DIFF WBC: CPT | Performed by: FAMILY MEDICINE

## 2017-03-25 PROCEDURE — 86038 ANTINUCLEAR ANTIBODIES: CPT | Performed by: INTERNAL MEDICINE

## 2017-03-25 PROCEDURE — 80053 COMPREHEN METABOLIC PANEL: CPT | Performed by: INTERNAL MEDICINE

## 2017-03-25 PROCEDURE — 74011250637 HC RX REV CODE- 250/637: Performed by: INTERNAL MEDICINE

## 2017-03-25 PROCEDURE — 84443 ASSAY THYROID STIM HORMONE: CPT | Performed by: FAMILY MEDICINE

## 2017-03-25 PROCEDURE — 74011250636 HC RX REV CODE- 250/636: Performed by: FAMILY MEDICINE

## 2017-03-25 PROCEDURE — 74011250636 HC RX REV CODE- 250/636: Performed by: NURSE PRACTITIONER

## 2017-03-25 PROCEDURE — 83735 ASSAY OF MAGNESIUM: CPT | Performed by: INTERNAL MEDICINE

## 2017-03-25 PROCEDURE — 99218 HC RM OBSERVATION: CPT

## 2017-03-25 PROCEDURE — 83690 ASSAY OF LIPASE: CPT | Performed by: INTERNAL MEDICINE

## 2017-03-25 PROCEDURE — 74011250637 HC RX REV CODE- 250/637: Performed by: FAMILY MEDICINE

## 2017-03-25 PROCEDURE — C9113 INJ PANTOPRAZOLE SODIUM, VIA: HCPCS | Performed by: INTERNAL MEDICINE

## 2017-03-25 PROCEDURE — 36415 COLL VENOUS BLD VENIPUNCTURE: CPT | Performed by: INTERNAL MEDICINE

## 2017-03-25 RX ORDER — POTASSIUM CHLORIDE 1.5 G/1.77G
40 POWDER, FOR SOLUTION ORAL
Status: COMPLETED | OUTPATIENT
Start: 2017-03-25 | End: 2017-03-25

## 2017-03-25 RX ORDER — MORPHINE SULFATE 2 MG/ML
1 INJECTION, SOLUTION INTRAMUSCULAR; INTRAVENOUS
Status: DISCONTINUED | OUTPATIENT
Start: 2017-03-25 | End: 2017-03-26

## 2017-03-25 RX ADMIN — ONDANSETRON HYDROCHLORIDE 4 MG: 2 SOLUTION INTRAMUSCULAR; INTRAVENOUS at 16:15

## 2017-03-25 RX ADMIN — PANTOPRAZOLE SODIUM 40 MG: 40 INJECTION, POWDER, FOR SOLUTION INTRAVENOUS at 10:42

## 2017-03-25 RX ADMIN — ONDANSETRON HYDROCHLORIDE 4 MG: 2 SOLUTION INTRAMUSCULAR; INTRAVENOUS at 21:37

## 2017-03-25 RX ADMIN — SODIUM CHLORIDE 125 ML/HR: 900 INJECTION, SOLUTION INTRAVENOUS at 21:00

## 2017-03-25 RX ADMIN — POTASSIUM CHLORIDE 40 MEQ: 1.5 POWDER, FOR SOLUTION ORAL at 16:14

## 2017-03-25 RX ADMIN — DOCUSATE SODIUM 100 MG: 100 CAPSULE, LIQUID FILLED ORAL at 10:42

## 2017-03-25 RX ADMIN — PANTOPRAZOLE SODIUM 40 MG: 40 INJECTION, POWDER, FOR SOLUTION INTRAVENOUS at 21:27

## 2017-03-25 RX ADMIN — SODIUM CHLORIDE 125 ML/HR: 900 INJECTION, SOLUTION INTRAVENOUS at 18:40

## 2017-03-25 RX ADMIN — Medication 2 MG: at 10:42

## 2017-03-25 RX ADMIN — POLYETHYLENE GLYCOL 3350 17 G: 17 POWDER, FOR SOLUTION ORAL at 16:14

## 2017-03-25 RX ADMIN — Medication 1 MG: at 16:15

## 2017-03-25 RX ADMIN — POLYETHYLENE GLYCOL 3350 17 G: 17 POWDER, FOR SOLUTION ORAL at 10:41

## 2017-03-25 RX ADMIN — Medication 1 MG: at 20:18

## 2017-03-25 RX ADMIN — SODIUM CHLORIDE 125 ML/HR: 900 INJECTION, SOLUTION INTRAVENOUS at 06:12

## 2017-03-25 RX ADMIN — ENOXAPARIN SODIUM 40 MG: 40 INJECTION SUBCUTANEOUS at 10:41

## 2017-03-25 RX ADMIN — Medication 2 MG: at 05:08

## 2017-03-25 RX ADMIN — NALOXEGOL OXALATE 25 MG: 25 TABLET, FILM COATED ORAL at 10:42

## 2017-03-25 RX ADMIN — DOCUSATE SODIUM 100 MG: 100 CAPSULE, LIQUID FILLED ORAL at 18:43

## 2017-03-25 NOTE — ROUTINE PROCESS
Received pt. A&O x 4. Pt denies any N/V. Bowel sounds  active     States she is tolerating her oral intake well.     pain OF 9\10. Pain med given no  distress at this time.

## 2017-03-25 NOTE — PROGRESS NOTES
[place]  [facility], [address]    Progress Note    Patient: Rigoberto Luis MRN: [id number]  SSN: [id number]    YOB: 1990  Age: 23 y.o. Sex: female      Admit Date: 3/23/2017    LOS: 0 days     Chief complaint:abd pain     Impression:     1. Acute pancreatitis, lipase now normal-- suspect alcohol related, did admit to heavy ETOH use chronically on adm but denies now. Neg US for biliary source and TGY normal .    2. Chronic abdominal pain possibly due to constipation   3. Recurrent nausea and vomiting -- pancreatitis vs PUD vs biliary source vs marijuana hyperemesis syndrome  4. Abnormal GI imaging - transient small bowel dilation on prior CT scans very non specific may want to consider a SBFT as out pt if abd pains persist   5. Chronic constipation   6. Drug screen + for opiates and MJ. Is patient on chronic pain management? High risk for opioid abuse in my opinion . 7. Hx of bipolar disorder    Plan:     1. Advance diet as tolerated   2. Avoid narcotic use long term   3. miralax long term as out pt   4. Awaiting IGG subtype 4 and NATALIA but suspect they will be negative     Will see on Monday call for any questions in the interim pgr [phone]      Subjective: The patient problems have remained about the same . The pt notes nausea and same level of abd pain as on admission even though her lipase is now normal .  Tolerating liquids .       Objective:     Vitals:    03/24/17 2358 03/25/17 0400 03/25/17 0418 03/25/17 0846   BP: 129/73 120/76 109/75 128/68   Pulse: 64 (!) 56 62 60   Resp: 18 18 18 18   Temp: 98.5 °F (36.9 °C) 98.7 °F (37.1 °C) 99 °F (37.2 °C) 98.9 °F (37.2 °C)   SpO2: 100% 100% 100% 98%   Weight:       Height:            Physical Exam:   GENERAL: fatigued, cooperative, mild distress, appears stated age  ABDOMEN: scaphoid tender     Lab/Data Review:  BMP:   Lab Results   Component Value Date/Time     03/25/2017 03:10 AM    K 3.3 (L) 03/25/2017 03:10 AM     03/25/2017 03:10 AM    CO2 26 03/25/2017 03:10 AM    AGAP 9 03/25/2017 03:10 AM    GLU 69 (L) 03/25/2017 03:10 AM    BUN 5 (L) 03/25/2017 03:10 AM    CREA 0.65 03/25/2017 03:10 AM    GFRAA >60 03/25/2017 03:10 AM    GFRNA >60 03/25/2017 03:10 AM     CMP:   Lab Results   Component Value Date/Time     03/25/2017 03:10 AM    K 3.3 (L) 03/25/2017 03:10 AM     03/25/2017 03:10 AM    CO2 26 03/25/2017 03:10 AM    AGAP 9 03/25/2017 03:10 AM    GLU 69 (L) 03/25/2017 03:10 AM    BUN 5 (L) 03/25/2017 03:10 AM    CREA 0.65 03/25/2017 03:10 AM    GFRAA >60 03/25/2017 03:10 AM    GFRNA >60 03/25/2017 03:10 AM    CA 8.1 (L) 03/25/2017 03:10 AM    MG 1.9 03/25/2017 03:10 AM    ALB 3.2 (L) 03/25/2017 03:10 AM    TP 5.7 (L) 03/25/2017 03:10 AM    GLOB 2.5 03/25/2017 03:10 AM    AGRAT 1.3 03/25/2017 03:10 AM    SGOT 14 (L) 03/25/2017 03:10 AM    ALT 18 03/25/2017 03:10 AM     CBC:   Lab Results   Component Value Date/Time    WBC 6.7 03/25/2017 03:10 AM    HGB 11.7 (L) 03/25/2017 03:10 AM    HCT 34.9 (L) 03/25/2017 03:10 AM     03/25/2017 03:10 AM     COAGS: No results found for: APTT, PTP, INR  Liver Panel:   Lab Results   Component Value Date/Time    ALB 3.2 (L) 03/25/2017 03:10 AM    TP 5.7 (L) 03/25/2017 03:10 AM    GLOB 2.5 03/25/2017 03:10 AM    AGRAT 1.3 03/25/2017 03:10 AM    SGOT 14 (L) 03/25/2017 03:10 AM    ALT 18 03/25/2017 03:10 AM    AP 96 03/25/2017 03:10 AM          Active Problems:    Pancreatitis, acute (3/24/2017)      Abdominal pain, acute, generalized (3/24/2017)          Signed By: Shira Causey MD     March 25, 2017

## 2017-03-25 NOTE — ROUTINE PROCESS
Bedside shift change report given to [address] [place] [address] (oncoming nurse) by Corinna Ordonez (offgoing nurse). Report given with SBAR, Kardex, Intake/Output, MAR and Recent Results.

## 2017-03-25 NOTE — PROGRESS NOTES
Progress Note    Patient: Gustavo Rodriguez MRN: [id number]  CSN: [id number]    YOB: 1990  Age: 23 y.o. Sex: female    DOA: 3/23/2017 LOS:  LOS: 0 days                    Subjective:     Pt still c/o abdominal pain. couldn't tolerate solid food , no vomiting still has no BM discussed with pt importance to take stool softener and have BM. Urine drug screen positive THC and opiates. Blood culture negative   GC/CT urine culture is pending. Potassium slightly low 3.3  CXR negative, EKG is pending. Hospital course  Gustavo Rodriguez is a 23 y.o.  female who has h/o poor compliant last seen at my office 3 years ago . Pt presented to ER c/o nausea and vomiting x 20 since waking up this morning. Pt also c/o RLQ,  abdominal pain that radiates to the right flank. Pt also has Lt upper quadrant pain      Pt also c/o vaginal spotting but states that it is because she is due for Depovera shot. She has been f/u women care at [address] [place] [address]  She denies any dysuria, frequency, vaginal discharge, diarrhea, or fever associated with her symptoms. Pt has surgival hx of Morina removal from abdomen and  section.      She states that she is allergic to Doxycycline and Latex. Pt admits to occasional tobacco use and alcohol use. she reported she had one hard Drink 2 days ago     She denies any illicit drug use. The patient reports no other symptoms and has no other concerns at this time.      CT scan abdomen and pelvis  Moderate amount of retained stool in proximal and mid colon.      Normal appendix identified.      No definable acute processes in abdomen or in pelvis.          Vaginal U/S  1. Normal uterus and endometrial stripe   2. Normal ovaries. 3. Small amount of free fluid in the cul-de-sac.     CXR  Negative study.      EKG pending  U/S gallbladder pending    Chief Complaint:   Chief Complaint   Patient presents with    Abdominal Pain    Nausea    Vomiting       Review of systems  GENERAL: Patient alert, awake and oriented times 3, able to communicate full sentences and not in distress. HEENT: No change in vision, no earache, tinnitus, sore throat or sinus congestion. NECK: No pain or stiffness. CARDIOVASCULAR: No chest pain or pressure. No palpitations. PULMONARY: No shortness of breath, cough or wheeze. GASTROINTESTINAL: positive  abdominal pain, positive  nausea, vomiting no  diarrhea, melena or bright red blood per rectum. GENITOURINARY: No urinary frequency, urgency, hesitancy or dysuria. MUSCULOSKELETAL: No joint or muscle pain, positive  back pain, no recent trauma. DERMATOLOGIC: No rash, no itching, no lesions. ENDOCRINE: No polyuria, polydipsia, no heat or cold intolerance. No recent change in weight. HEMATOLOGICAL: No anemia or easy bruising or bleeding. NEUROLOGIC: No headache, seizures, numbness, tingling or weakness. PSYCHIATRIC: No depression, anxiety, H/O Bipolar, no loss of interest in normal activity or change in sleep pattern    Objective:     Physical Exam:  Visit Vitals    /70 (BP 1 Location: Left arm, BP Patient Position: At rest)    Pulse 64    Temp 98 °F (36.7 °C)    Resp 18    Ht 4' 8\" (1.422 m)    Wt 52.2 kg (115 lb)    SpO2 97%    Breastfeeding No    BMI 25.78 kg/m2      General:  Alert, cooperative, no distress, appears stated age. Head:  Normocephalic, without obvious abnormality, atraumatic. Eyes:  Conjunctivae/corneas clear. PERRL, EOMs intact. Nose: Nares normal. No drainage or sinus tenderness. Throat: Lips, mucosa, and tongue dry   Neck: Supple, symmetrical, trachea midline, no adenopathy, thyroid: no enlargement/tenderness/nodules, no carotid bruit and no JVD. Back:  ROM normal. No CVA tenderness. Lungs:  Clear to auscultation bilaterally. Chest wall:  No tenderness or deformity. Heart:  Regular rate and rhythm, S1, S2 normal, no murmur, click, rub or gallop. Abdomen: Soft, tender Lt upper quadrant epigastric tenderness . Bowel sounds normal. No masses, No organomegaly. Extremities: Extremities normal, atraumatic, no cyanosis or edema. Pulses: 2+ and symmetric all extremities. Skin: Skin color, texture, turgor normal. No rashes or lesions   Neurologic: CNII-XII intact. No focal motor or sensory deficit. Intake and Output:  Current Shift:  03/25 0701 - 03/25 1900  In: -   Out: 800 [Urine:800]  Last three shifts:  03/23 1901 - 03/25 0700  In: 1980 [P.O.:480; I.V.:1500]  Out: 100 [Urine:100]    Labs: Results:       Chemistry Recent Labs      03/25/17 0310 03/23/17 1920   GLU  69*  97   NA  141  141   K  3.3*  3.8   CL  106  107   CO2  26  25   BUN  5*  14   CREA  0.65  0.79   CA  8.1*  9.5   AGAP  9  9   BUCR  8*  18   AP  96  137*   TP  5.7*  8.5*   ALB  3.2*  4.6   GLOB  2.5  3.9   AGRAT  1.3  1.2      CBC w/Diff Recent Labs      03/25/17 0310 03/23/17 1920   WBC  6.7  12.8   RBC  3.66*  4.46   HGB  11.7*  14.6   HCT  34.9*  42.2   PLT  183  240   GRANS  45  85*   LYMPH  47  11*   EOS  1  0      Cardiac Enzymes No results for input(s): CPK, CKND1, GILLES in the last 72 hours. No lab exists for component: CKRMB, TROIP   Coagulation No results for input(s): PTP, INR, APTT in the last 72 hours. No lab exists for component: INREXT    Lipid Panel Lab Results   Component Value Date/Time    Triglyceride 106 03/24/2017 03:55 PM      BNP No results for input(s): BNPP in the last 72 hours.    Liver Enzymes Recent Labs      03/25/17 0310   TP  5.7*   ALB  3.2*   AP  96   SGOT  14*      Thyroid Studies Lab Results   Component Value Date/Time    TSH 1.58 03/25/2017 03:10 AM          Procedures/imaging: see electronic medical records for all procedures/Xrays and details which were not copied into this note but were reviewed prior to creation of Plan    Medications:   Current Facility-Administered Medications   Medication Dose Route Frequency    0.9% sodium chloride infusion  125 mL/hr IntraVENous CONTINUOUS    docusate sodium (COLACE) capsule 100 mg  100 mg Oral BID    naloxegol (MOVANTIK) tablet 25 mg  25 mg Oral ACB    morphine injection 2 mg  2 mg IntraVENous Q4H PRN    bisacodyl (DULCOLAX) suppository 10 mg  10 mg Rectal DAILY PRN    enoxaparin (LOVENOX) injection 40 mg  40 mg SubCUTAneous Q24H    ondansetron (ZOFRAN) injection 4 mg  4 mg IntraVENous Q6H PRN    pantoprazole (PROTONIX) injection 40 mg  40 mg IntraVENous Q12H    polyethylene glycol (MIRALAX) packet 17 g  17 g Oral TID       Assessment/Plan     Active Problems:    Pancreatitis, acute (3/24/2017)      Abdominal pain, acute, generalized (3/24/2017)    Plan    Abdominal pain/ pancreatitis  Lipase back to normal  Continue IV hydration  Check ultrasound gallbladder  F/u GC/CT culture  F/U IGG , NATALIA    Constipation  Movantik  25 mg daily  Colace 100 mg BID  christina ax 17 gm daily    Bipolar D/O  F/u psychiatry as outpatinet    Continue to monitor lab  Potassium 40 waldo x1 today  Discussed with Dr Norbert Franco. Might need magnesium citrate will wait until tomorrow monitor for response to Movantik . i will decrease morphine to 1 mg q4h prn monitor sx .   Continue zofran prn    DVT and GI prophylaxis Lovenox and protonix    Nisha Wright MD  3/25/2017 12:32 PM

## 2017-03-26 LAB
ALBUMIN SERPL BCP-MCNC: 3.7 G/DL (ref 3.4–5)
ALBUMIN/GLOB SERPL: 1.1 {RATIO} (ref 0.8–1.7)
ALP SERPL-CCNC: 103 U/L (ref 45–117)
ALT SERPL-CCNC: 20 U/L (ref 13–56)
ANION GAP BLD CALC-SCNC: 11 MMOL/L (ref 3–18)
AST SERPL W P-5'-P-CCNC: 13 U/L (ref 15–37)
BASOPHILS # BLD AUTO: 0 K/UL (ref 0–0.1)
BASOPHILS # BLD: 0 % (ref 0–2)
BILIRUB SERPL-MCNC: 0.3 MG/DL (ref 0.2–1)
BUN SERPL-MCNC: 6 MG/DL (ref 7–18)
BUN/CREAT SERPL: 7 (ref 12–20)
C TRACH RRNA SPEC QL NAA+PROBE: NEGATIVE
CALCIUM SERPL-MCNC: 8.4 MG/DL (ref 8.5–10.1)
CHLORIDE SERPL-SCNC: 106 MMOL/L (ref 100–108)
CO2 SERPL-SCNC: 24 MMOL/L (ref 21–32)
CREAT SERPL-MCNC: 0.86 MG/DL (ref 0.6–1.3)
DIFFERENTIAL METHOD BLD: ABNORMAL
EOSINOPHIL # BLD: 0 K/UL (ref 0–0.4)
EOSINOPHIL NFR BLD: 1 % (ref 0–5)
ERYTHROCYTE [DISTWIDTH] IN BLOOD BY AUTOMATED COUNT: 12 % (ref 11.6–14.5)
GGT SERPL-CCNC: 72 IU/L (ref 0–60)
GLOBULIN SER CALC-MCNC: 3.3 G/DL (ref 2–4)
GLUCOSE SERPL-MCNC: 117 MG/DL (ref 74–99)
HCT VFR BLD AUTO: 38.2 % (ref 35–45)
HGB BLD-MCNC: 13.1 G/DL (ref 12–16)
LYMPHOCYTES # BLD AUTO: 46 % (ref 21–52)
LYMPHOCYTES # BLD: 2.7 K/UL (ref 0.9–3.6)
MAGNESIUM SERPL-MCNC: 2.2 MG/DL (ref 1.8–2.4)
MCH RBC QN AUTO: 32.3 PG (ref 24–34)
MCHC RBC AUTO-ENTMCNC: 34.3 G/DL (ref 31–37)
MCV RBC AUTO: 94.1 FL (ref 74–97)
MONOCYTES # BLD: 0.7 K/UL (ref 0.05–1.2)
MONOCYTES NFR BLD AUTO: 11 % (ref 3–10)
N GONORRHOEA RRNA SPEC QL NAA+PROBE: NEGATIVE
NEUTS SEG # BLD: 2.5 K/UL (ref 1.8–8)
NEUTS SEG NFR BLD AUTO: 42 % (ref 40–73)
PLATELET # BLD AUTO: 204 K/UL (ref 135–420)
PMV BLD AUTO: 10.8 FL (ref 9.2–11.8)
POTASSIUM SERPL-SCNC: 3.7 MMOL/L (ref 3.5–5.5)
PROT SERPL-MCNC: 7 G/DL (ref 6.4–8.2)
RBC # BLD AUTO: 4.06 M/UL (ref 4.2–5.3)
SODIUM SERPL-SCNC: 141 MMOL/L (ref 136–145)
SPECIMEN SOURCE: NORMAL
WBC # BLD AUTO: 5.9 K/UL (ref 4.6–13.2)

## 2017-03-26 PROCEDURE — 83735 ASSAY OF MAGNESIUM: CPT | Performed by: FAMILY MEDICINE

## 2017-03-26 PROCEDURE — 65270000029 HC RM PRIVATE

## 2017-03-26 PROCEDURE — 74011250637 HC RX REV CODE- 250/637: Performed by: INTERNAL MEDICINE

## 2017-03-26 PROCEDURE — 99218 HC RM OBSERVATION: CPT

## 2017-03-26 PROCEDURE — 74011250636 HC RX REV CODE- 250/636: Performed by: INTERNAL MEDICINE

## 2017-03-26 PROCEDURE — 74011250637 HC RX REV CODE- 250/637: Performed by: FAMILY MEDICINE

## 2017-03-26 PROCEDURE — 80053 COMPREHEN METABOLIC PANEL: CPT | Performed by: FAMILY MEDICINE

## 2017-03-26 PROCEDURE — C9113 INJ PANTOPRAZOLE SODIUM, VIA: HCPCS | Performed by: INTERNAL MEDICINE

## 2017-03-26 PROCEDURE — 36415 COLL VENOUS BLD VENIPUNCTURE: CPT | Performed by: FAMILY MEDICINE

## 2017-03-26 PROCEDURE — 74011250636 HC RX REV CODE- 250/636: Performed by: FAMILY MEDICINE

## 2017-03-26 PROCEDURE — 85025 COMPLETE CBC W/AUTO DIFF WBC: CPT | Performed by: FAMILY MEDICINE

## 2017-03-26 RX ORDER — MORPHINE SULFATE 2 MG/ML
0.5 INJECTION, SOLUTION INTRAMUSCULAR; INTRAVENOUS
Status: DISCONTINUED | OUTPATIENT
Start: 2017-03-26 | End: 2017-03-27

## 2017-03-26 RX ORDER — MAGNESIUM CITRATE
296 SOLUTION, ORAL ORAL
Status: COMPLETED | OUTPATIENT
Start: 2017-03-26 | End: 2017-03-26

## 2017-03-26 RX ORDER — ONDANSETRON 2 MG/ML
4 INJECTION INTRAMUSCULAR; INTRAVENOUS ONCE
Status: COMPLETED | OUTPATIENT
Start: 2017-03-26 | End: 2017-03-26

## 2017-03-26 RX ORDER — ASPIRIN 325 MG
TABLET ORAL
Status: DISCONTINUED
Start: 2017-03-26 | End: 2017-03-26

## 2017-03-26 RX ADMIN — PANTOPRAZOLE SODIUM 40 MG: 40 INJECTION, POWDER, FOR SOLUTION INTRAVENOUS at 20:41

## 2017-03-26 RX ADMIN — PANTOPRAZOLE SODIUM 40 MG: 40 INJECTION, POWDER, FOR SOLUTION INTRAVENOUS at 08:17

## 2017-03-26 RX ADMIN — POLYETHYLENE GLYCOL 3350 17 G: 17 POWDER, FOR SOLUTION ORAL at 08:15

## 2017-03-26 RX ADMIN — Medication 1 MG: at 10:05

## 2017-03-26 RX ADMIN — Medication 0.5 MG: at 20:40

## 2017-03-26 RX ADMIN — DOCUSATE SODIUM 100 MG: 100 CAPSULE, LIQUID FILLED ORAL at 17:50

## 2017-03-26 RX ADMIN — Medication 1 MG: at 01:01

## 2017-03-26 RX ADMIN — MAGNESIUM CITRATE 296 ML: 1.75 LIQUID ORAL at 02:25

## 2017-03-26 RX ADMIN — POLYETHYLENE GLYCOL 3350 17 G: 17 POWDER, FOR SOLUTION ORAL at 22:56

## 2017-03-26 RX ADMIN — POLYETHYLENE GLYCOL 3350 17 G: 17 POWDER, FOR SOLUTION ORAL at 15:13

## 2017-03-26 RX ADMIN — ONDANSETRON HYDROCHLORIDE 4 MG: 2 SOLUTION INTRAMUSCULAR; INTRAVENOUS at 15:13

## 2017-03-26 RX ADMIN — ONDANSETRON 4 MG: 2 INJECTION INTRAMUSCULAR; INTRAVENOUS at 06:20

## 2017-03-26 RX ADMIN — Medication 0.5 MG: at 15:04

## 2017-03-26 RX ADMIN — NALOXEGOL OXALATE 25 MG: 25 TABLET, FILM COATED ORAL at 08:16

## 2017-03-26 RX ADMIN — DOCUSATE SODIUM 100 MG: 100 CAPSULE, LIQUID FILLED ORAL at 08:16

## 2017-03-26 RX ADMIN — Medication 1 MG: at 06:20

## 2017-03-26 RX ADMIN — ENOXAPARIN SODIUM 40 MG: 40 INJECTION SUBCUTANEOUS at 08:16

## 2017-03-26 NOTE — PROGRESS NOTES
Bedside shift change report given to Chrissy RN (oncoming nurse) by Tonny Francisco (offgoing nurse). Report included in the following information SBAR, Kardex, ED Summary, OR Summary, Procedure Summary, Intake/Output, MAR and Recent Results.

## 2017-03-26 NOTE — PROGRESS NOTES
Patient is alert and oriented x 4, lungs clear bilaterally, bowel sounds active in all quadrants, patient denies any acute distress,SOB and chest pain. Patient is tolerating regular food without difficulties, no complain of nausea or vomiting. Patient ambulate independently, voiding without difficulties, no apparent distress noted, no neurological deficit noted, placed call bell within reach, will continue to monitor.

## 2017-03-26 NOTE — ROUTINE PROCESS
Bedside and Verbal shift change report given to [address] (oncoming nurse) by Nikkie Souza (offgoing nurse). Report included the following information SBAR, Kardex, Procedure Summary, Intake/Output, MAR and Recent Results.

## 2017-03-26 NOTE — PROGRESS NOTES
03/25/17 1839   Patient Observation   Observations pt ambulated to the bathroom and back  resting in bed, states pain is bearable at this time. denies any N/V. pt tolerating her oral intake well. No BM, but passing flatus. states miralax does not work for her. med given. no s/s of distress.

## 2017-03-26 NOTE — PROGRESS NOTES
Progress Note    Patient: Cris Richards MRN: [id number]  CSN: [id number]    YOB: 1990  Age: 23 y.o. Sex: female    DOA: 3/23/2017 LOS:  LOS: 0 days                    Subjective:     Pt still c/o abdominal pain. Lt upper quadrant had mag citrate this morning discussed with nursing staff she had small BM will give dulcolax prn    Urine drug screen positive THC and opiates. Blood culture negative   GC/CT urine culture is negative . Potassium normal today . CXR negative,     Hospital course  Cris Richards is a 23 y.o.  female who has h/o poor compliant last seen at my office 3 years ago . Pt presented to ER c/o nausea and vomiting x 20 since waking up this morning. Pt also c/o RLQ,  abdominal pain that radiates to the right flank. Pt also has Lt upper quadrant pain      Pt also c/o vaginal spotting but states that it is because she is due for Depovera shot. She has been f/u women care at [address] [place] [address] Avenue  She denies any dysuria, frequency, vaginal discharge, diarrhea, or fever associated with her symptoms. Pt has surgival hx of Morina removal from abdomen and  section.      She states that she is allergic to Doxycycline and Latex. Pt admits to occasional tobacco use and alcohol use. she reported she had one hard Drink 2 days ago     She denies any illicit drug use. The patient reports no other symptoms and has no other concerns at this time.      CT scan abdomen and pelvis  Moderate amount of retained stool in proximal and mid colon.      Normal appendix identified.      No definable acute processes in abdomen or in pelvis.          Vaginal U/S  1. Normal uterus and endometrial stripe   2. Normal ovaries. 3. Small amount of free fluid in the cul-de-sac. CXR  Negative study.      EKG pending  U/S gallbladder    No diagnostic abnormality as described.     Chief Complaint:   Chief Complaint   Patient presents with    Abdominal Pain    Nausea    Vomiting       Review of systems  GENERAL: Patient alert, awake and oriented times 3, able to communicate full sentences and not in distress. HEENT: No change in vision, no earache, tinnitus, sore throat or sinus congestion. NECK: No pain or stiffness. CARDIOVASCULAR: No chest pain or pressure. No palpitations. PULMONARY: No shortness of breath, cough or wheeze. GASTROINTESTINAL: positive  abdominal pain, positive  nausea,no vomiting no  diarrhea, melena or bright red blood per rectum. positive constipation  GENITOURINARY: No urinary frequency, urgency, hesitancy or dysuria. MUSCULOSKELETAL: No joint or muscle pain, positive  back pain, no recent trauma. DERMATOLOGIC: No rash, no itching, no lesions. ENDOCRINE: No polyuria, polydipsia, no heat or cold intolerance. No recent change in weight. HEMATOLOGICAL: No anemia or easy bruising or bleeding. NEUROLOGIC: No headache, seizures, numbness, tingling or weakness. PSYCHIATRIC: No depression, anxiety, H/O Bipolar, no loss of interest in normal activity or change in sleep pattern    Objective:     Physical Exam:  Visit Vitals    /73 (BP 1 Location: Left arm, BP Patient Position: At rest;Supine)    Pulse 60    Temp 99 °F (37.2 °C)    Resp 17    Ht 4' 8\" (1.422 m)    Wt 52.2 kg (115 lb)    SpO2 100%    Breastfeeding No    BMI 25.78 kg/m2      General:  Alert, cooperative, no distress, appears stated age. Head:  Normocephalic, without obvious abnormality, atraumatic. Eyes:  Conjunctivae/corneas clear. PERRL, EOMs intact. Nose: Nares normal. No drainage or sinus tenderness. Throat: Lips, mucosa, and tongue dry   Neck: Supple, symmetrical, trachea midline, no adenopathy, thyroid: no enlargement/tenderness/nodules, no carotid bruit and no JVD. Back:  ROM normal. No CVA tenderness. Lungs:  Clear to auscultation bilaterally. Chest wall:  No tenderness or deformity. Heart:  Regular rate and rhythm, S1, S2 normal, no murmur, click, rub or gallop.    Abdomen: Soft, tender Lt upper quadrant epigastric tenderness . Bowel sounds normal. No masses, No organomegaly. Extremities: Extremities normal, atraumatic, no cyanosis or edema. Pulses: 2+ and symmetric all extremities. Skin: Skin color, texture, turgor normal. No rashes or lesions   Neurologic: CNII-XII intact. No focal motor or sensory deficit. Intake and Output:  Current Shift:     Last three shifts:  03/24 1901 - 03/26 0700  In: 8988 [P.O.:240; I.V.:1500]  Out: 1650 [Urine:1650]    Labs: Results:       Chemistry Recent Labs      03/26/17 0310 03/25/17 0310 03/23/17 1920   GLU  117*  69*  97   NA  141  141  141   K  3.7  3.3*  3.8   CL  106  106  107   CO2  24 26 25   BUN  6*  5*  14   CREA  0.86  0.65  0.79   CA  8.4*  8.1*  9.5   AGAP  11  9  9   BUCR  7*  8*  18   AP  103  96  137*   TP  7.0  5.7*  8.5*   ALB  3.7  3.2*  4.6   GLOB  3.3  2.5  3.9   AGRAT  1.1  1.3  1.2      CBC w/Diff Recent Labs      03/26/17 0310 03/25/17 0310 03/23/17 1920   WBC  5.9  6.7  12.8   RBC  4.06*  3.66*  4.46   HGB  13.1  11.7*  14.6   HCT  38.2  34.9*  42.2   PLT  204  183  240   GRANS  42  45  85*   LYMPH  46  47  11*   EOS  1  1  0      Cardiac Enzymes No results for input(s): CPK, CKND1, GILLES in the last 72 hours. No lab exists for component: CKRMB, TROIP   Coagulation No results for input(s): PTP, INR, APTT in the last 72 hours. No lab exists for component: INREXT, INREXT    Lipid Panel Lab Results   Component Value Date/Time    Triglyceride 106 03/24/2017 03:55 PM      BNP No results for input(s): BNPP in the last 72 hours.    Liver Enzymes Recent Labs      03/26/17 0310   TP  7.0   ALB  3.7   AP  103   SGOT  13*      Thyroid Studies Lab Results   Component Value Date/Time    TSH 1.58 03/25/2017 03:10 AM          Procedures/imaging: see electronic medical records for all procedures/Xrays and details which were not copied into this note but were reviewed prior to creation of Plan    Medications:   Current Facility-Administered Medications   Medication Dose Route Frequency    morphine injection 1 mg  1 mg IntraVENous Q4H PRN    0.9% sodium chloride infusion  125 mL/hr IntraVENous CONTINUOUS    docusate sodium (COLACE) capsule 100 mg  100 mg Oral BID    naloxegol (MOVANTIK) tablet 25 mg  25 mg Oral ACB    bisacodyl (DULCOLAX) suppository 10 mg  10 mg Rectal DAILY PRN    enoxaparin (LOVENOX) injection 40 mg  40 mg SubCUTAneous Q24H    ondansetron (ZOFRAN) injection 4 mg  4 mg IntraVENous Q6H PRN    pantoprazole (PROTONIX) injection 40 mg  40 mg IntraVENous Q12H    polyethylene glycol (MIRALAX) packet 17 g  17 g Oral TID       Assessment/Plan     Active Problems:    Pancreatitis, acute (3/24/2017)      Abdominal pain, acute, generalized (3/24/2017)    Plan    Abdominal pain/ pancreatitis  Lipase back to normal  Continue IV hydration   ultrasound gallbladder negative   F/u GC/CT culture negative  GGT 72    F/U IGG result  is pending  , NATALIA result is pending  Will switch observation to admission      Constipation  Movantik  25 mg daily  Colace 100 mg BID  christina ax 17 gm daily  Given mag citrate per GI  Still has abdominal pain       Bipolar D/O  F/u psychiatry as outpatinet    Continue to monitor lab.   Cbc, cmp, mag  Decrease morphine to 1 mg q3h prn  Continue zofran prn    DVT and GI prophylaxis Lovenox and protonix    Rahat Baez MD  3/26/2017 12:39 PM

## 2017-03-26 NOTE — PROGRESS NOTES
[place]  [address] [facility], [address]    Progress Note    Patient: Lynne Wu MRN: [id number]  SSN: [id number]    YOB: 1990  Age: 23 y.o. Sex: female      Admit Date: 3/23/2017    LOS: 0 days     Chief complaint:abd pain     Impression:     1. Acute pancreatitis, lipase now normal-- suspect alcohol related, did admit to heavy ETOH use chronically on adm but denies now. Neg US for biliary source and TGY normal .   2. Chronic abdominal pain possibly due to constipation   3. Recurrent nausea and vomiting -- pancreatitis vs PUD vs biliary source vs marijuana hyperemesis syndrome  4. Abnormal GI imaging - transient small bowel dilation on prior CT scans very non specific may want to consider a SBFT as out pt if abd pains persist   5. Chronic constipation   6. Drug screen + for opiates and MJ. Interestingly the pt now tells me she is on not on the listed opiod out pt meds. High risk for opioid abuse in my opinion and this is worrysome . 7. Hx of bipolar disorder    Plan:     1. Continue movantik and miralax   2. Add mag citrate 300cc this am with a dose of zofran before, if no response repeat Monday   3. Minimize narcotic use       Subjective: The patient problems have remained about the same . No bms still has pain but no nausea. I asked her about being on narcotics as an out pt and she denied being on hydrocodone as listed in her chart.   Drug screen + for opioids and MJ.      Objective:     Vitals:    03/25/17 1219 03/25/17 1626 03/25/17 1950 03/25/17 2016   BP: 111/70 144/61 111/71 120/72   Pulse: 64 66 69    Resp: 18 18 18    Temp: 98 °F (36.7 °C) 98.6 °F (37 °C) 98.7 °F (37.1 °C)    SpO2: 97% 97% 100%    Weight:       Height:            Physical Exam:   GENERAL: alert, cooperative, no distress, appears stated age  ABDOMEN: scaphoid     Lab/Data Review:  BMP:   Lab Results   Component Value Date/Time     03/25/2017 03:10 AM    K 3.3 (L) 03/25/2017 03:10 AM     03/25/2017 03:10 AM    CO2 26 03/25/2017 03:10 AM    AGAP 9 03/25/2017 03:10 AM    GLU 69 (L) 03/25/2017 03:10 AM    BUN 5 (L) 03/25/2017 03:10 AM    CREA 0.65 03/25/2017 03:10 AM    GFRAA >60 03/25/2017 03:10 AM    GFRNA >60 03/25/2017 03:10 AM     CMP:   Lab Results   Component Value Date/Time     03/25/2017 03:10 AM    K 3.3 (L) 03/25/2017 03:10 AM     03/25/2017 03:10 AM    CO2 26 03/25/2017 03:10 AM    AGAP 9 03/25/2017 03:10 AM    GLU 69 (L) 03/25/2017 03:10 AM    BUN 5 (L) 03/25/2017 03:10 AM    CREA 0.65 03/25/2017 03:10 AM    GFRAA >60 03/25/2017 03:10 AM    GFRNA >60 03/25/2017 03:10 AM    CA 8.1 (L) 03/25/2017 03:10 AM    MG 1.9 03/25/2017 03:10 AM    ALB 3.2 (L) 03/25/2017 03:10 AM    TP 5.7 (L) 03/25/2017 03:10 AM    GLOB 2.5 03/25/2017 03:10 AM    AGRAT 1.3 03/25/2017 03:10 AM    SGOT 14 (L) 03/25/2017 03:10 AM    ALT 18 03/25/2017 03:10 AM     CBC:   Lab Results   Component Value Date/Time    WBC 6.7 03/25/2017 03:10 AM    HGB 11.7 (L) 03/25/2017 03:10 AM    HCT 34.9 (L) 03/25/2017 03:10 AM     03/25/2017 03:10 AM     COAGS: No results found for: APTT, PTP, INR  Liver Panel:   Lab Results   Component Value Date/Time    ALB 3.2 (L) 03/25/2017 03:10 AM    TP 5.7 (L) 03/25/2017 03:10 AM    GLOB 2.5 03/25/2017 03:10 AM    AGRAT 1.3 03/25/2017 03:10 AM    SGOT 14 (L) 03/25/2017 03:10 AM    ALT 18 03/25/2017 03:10 AM    AP 96 03/25/2017 03:10 AM          Active Problems:    Pancreatitis, acute (3/24/2017)      Abdominal pain, acute, generalized (3/24/2017)          Signed By: Magdaleno Ryan MD     March 26, 2017

## 2017-03-27 ENCOUNTER — APPOINTMENT (OUTPATIENT)
Dept: CT IMAGING | Age: 27
DRG: 282 | End: 2017-03-27
Attending: INTERNAL MEDICINE
Payer: MEDICAID

## 2017-03-27 LAB
ALBUMIN SERPL BCP-MCNC: 3.8 G/DL (ref 3.4–5)
ALBUMIN/GLOB SERPL: 1.1 {RATIO} (ref 0.8–1.7)
ALP BONE CFR SERPL: 42 % (ref 14–68)
ALP INTEST CFR SERPL: 0 % (ref 0–18)
ALP LIVER CFR SERPL: 58 % (ref 18–85)
ALP SERPL-CCNC: 105 U/L (ref 45–117)
ALP SERPL-CCNC: 92 IU/L (ref 39–117)
ALT SERPL-CCNC: 25 U/L (ref 13–56)
ANA SER QL: NEGATIVE
ANION GAP BLD CALC-SCNC: 4 MMOL/L (ref 3–18)
AST SERPL W P-5'-P-CCNC: 19 U/L (ref 15–37)
BASOPHILS # BLD AUTO: 0 K/UL (ref 0–0.06)
BASOPHILS # BLD: 0 % (ref 0–2)
BILIRUB SERPL-MCNC: 0.3 MG/DL (ref 0.2–1)
BUN SERPL-MCNC: 7 MG/DL (ref 7–18)
BUN/CREAT SERPL: 9 (ref 12–20)
CALCIUM SERPL-MCNC: 8.9 MG/DL (ref 8.5–10.1)
CHLORIDE SERPL-SCNC: 103 MMOL/L (ref 100–108)
CO2 SERPL-SCNC: 32 MMOL/L (ref 21–32)
CREAT SERPL-MCNC: 0.82 MG/DL (ref 0.6–1.3)
DIFFERENTIAL METHOD BLD: ABNORMAL
EOSINOPHIL # BLD: 0.1 K/UL (ref 0–0.4)
EOSINOPHIL NFR BLD: 1 % (ref 0–5)
ERYTHROCYTE [DISTWIDTH] IN BLOOD BY AUTOMATED COUNT: 11.9 % (ref 11.6–14.5)
GLOBULIN SER CALC-MCNC: 3.4 G/DL (ref 2–4)
GLUCOSE SERPL-MCNC: 81 MG/DL (ref 74–99)
HCT VFR BLD AUTO: 39.6 % (ref 35–45)
HGB BLD-MCNC: 13.7 G/DL (ref 12–16)
IGG4 SER-MCNC: 17 MG/DL (ref 1–291)
LYMPHOCYTES # BLD AUTO: 42 % (ref 21–52)
LYMPHOCYTES # BLD: 3 K/UL (ref 0.9–3.6)
MAGNESIUM SERPL-MCNC: 2.5 MG/DL (ref 1.8–2.4)
MCH RBC QN AUTO: 32.5 PG (ref 24–34)
MCHC RBC AUTO-ENTMCNC: 34.6 G/DL (ref 31–37)
MCV RBC AUTO: 94.1 FL (ref 74–97)
MONOCYTES # BLD: 0.8 K/UL (ref 0.05–1.2)
MONOCYTES NFR BLD AUTO: 11 % (ref 3–10)
NEUTS SEG # BLD: 3.4 K/UL (ref 1.8–8)
NEUTS SEG NFR BLD AUTO: 46 % (ref 40–73)
PLATELET # BLD AUTO: 195 K/UL (ref 135–420)
PMV BLD AUTO: 10.7 FL (ref 9.2–11.8)
POTASSIUM SERPL-SCNC: 3.7 MMOL/L (ref 3.5–5.5)
PROT SERPL-MCNC: 7.2 G/DL (ref 6.4–8.2)
RBC # BLD AUTO: 4.21 M/UL (ref 4.2–5.3)
SEE BELOW:, 164879: NORMAL
SODIUM SERPL-SCNC: 139 MMOL/L (ref 136–145)
WBC # BLD AUTO: 7.2 K/UL (ref 4.6–13.2)

## 2017-03-27 PROCEDURE — 65270000029 HC RM PRIVATE

## 2017-03-27 PROCEDURE — 74011250636 HC RX REV CODE- 250/636: Performed by: NURSE PRACTITIONER

## 2017-03-27 PROCEDURE — 74011250637 HC RX REV CODE- 250/637: Performed by: FAMILY MEDICINE

## 2017-03-27 PROCEDURE — 74011250636 HC RX REV CODE- 250/636: Performed by: FAMILY MEDICINE

## 2017-03-27 PROCEDURE — C9113 INJ PANTOPRAZOLE SODIUM, VIA: HCPCS | Performed by: INTERNAL MEDICINE

## 2017-03-27 PROCEDURE — 74011636320 HC RX REV CODE- 636/320: Performed by: FAMILY MEDICINE

## 2017-03-27 PROCEDURE — 74177 CT ABD & PELVIS W/CONTRAST: CPT

## 2017-03-27 PROCEDURE — 36415 COLL VENOUS BLD VENIPUNCTURE: CPT | Performed by: FAMILY MEDICINE

## 2017-03-27 PROCEDURE — 80053 COMPREHEN METABOLIC PANEL: CPT | Performed by: FAMILY MEDICINE

## 2017-03-27 PROCEDURE — 85025 COMPLETE CBC W/AUTO DIFF WBC: CPT | Performed by: FAMILY MEDICINE

## 2017-03-27 PROCEDURE — 74011250636 HC RX REV CODE- 250/636: Performed by: INTERNAL MEDICINE

## 2017-03-27 PROCEDURE — 83735 ASSAY OF MAGNESIUM: CPT | Performed by: FAMILY MEDICINE

## 2017-03-27 PROCEDURE — 74011250637 HC RX REV CODE- 250/637: Performed by: INTERNAL MEDICINE

## 2017-03-27 RX ORDER — HYDROCODONE BITARTRATE AND ACETAMINOPHEN 5; 325 MG/1; MG/1
1 TABLET ORAL
Status: DISCONTINUED | OUTPATIENT
Start: 2017-03-27 | End: 2017-03-30 | Stop reason: HOSPADM

## 2017-03-27 RX ADMIN — ONDANSETRON HYDROCHLORIDE 4 MG: 2 SOLUTION INTRAMUSCULAR; INTRAVENOUS at 22:14

## 2017-03-27 RX ADMIN — DIATRIZOATE MEGLUMINE AND DIATRIZOATE SODIUM 30 ML: 600; 100 SOLUTION ORAL; RECTAL at 15:07

## 2017-03-27 RX ADMIN — NALOXEGOL OXALATE 25 MG: 25 TABLET, FILM COATED ORAL at 09:02

## 2017-03-27 RX ADMIN — IOPAMIDOL 80 ML: 612 INJECTION, SOLUTION INTRAVENOUS at 17:20

## 2017-03-27 RX ADMIN — SODIUM CHLORIDE 125 ML/HR: 900 INJECTION, SOLUTION INTRAVENOUS at 03:49

## 2017-03-27 RX ADMIN — ONDANSETRON HYDROCHLORIDE 4 MG: 2 SOLUTION INTRAMUSCULAR; INTRAVENOUS at 15:40

## 2017-03-27 RX ADMIN — POLYETHYLENE GLYCOL 3350 17 G: 17 POWDER, FOR SOLUTION ORAL at 17:54

## 2017-03-27 RX ADMIN — DOCUSATE SODIUM 100 MG: 100 CAPSULE, LIQUID FILLED ORAL at 09:02

## 2017-03-27 RX ADMIN — PANTOPRAZOLE SODIUM 40 MG: 40 INJECTION, POWDER, FOR SOLUTION INTRAVENOUS at 09:02

## 2017-03-27 RX ADMIN — Medication 0.5 MG: at 17:54

## 2017-03-27 RX ADMIN — SODIUM CHLORIDE 75 ML/HR: 900 INJECTION, SOLUTION INTRAVENOUS at 16:17

## 2017-03-27 RX ADMIN — POLYETHYLENE GLYCOL 3350 17 G: 17 POWDER, FOR SOLUTION ORAL at 22:14

## 2017-03-27 RX ADMIN — Medication 0.5 MG: at 12:48

## 2017-03-27 RX ADMIN — PANTOPRAZOLE SODIUM 40 MG: 40 INJECTION, POWDER, FOR SOLUTION INTRAVENOUS at 22:14

## 2017-03-27 RX ADMIN — HYDROCODONE BITARTRATE AND ACETAMINOPHEN 1 TABLET: 5; 325 TABLET ORAL at 20:40

## 2017-03-27 RX ADMIN — Medication 0.5 MG: at 03:43

## 2017-03-27 RX ADMIN — Medication 0.5 MG: at 08:06

## 2017-03-27 RX ADMIN — ONDANSETRON HYDROCHLORIDE 4 MG: 2 SOLUTION INTRAMUSCULAR; INTRAVENOUS at 08:09

## 2017-03-27 RX ADMIN — ENOXAPARIN SODIUM 40 MG: 40 INJECTION SUBCUTANEOUS at 09:03

## 2017-03-27 RX ADMIN — DOCUSATE SODIUM 100 MG: 100 CAPSULE, LIQUID FILLED ORAL at 17:54

## 2017-03-27 NOTE — ROUTINE PROCESS
Bedside and Verbal shift change report given to Ashley (oncoming nurse) by Daniel Jarquin (offgoing nurse). Report included the following information SBAR, Kardex, MAR and Recent Results.     SITUATION:    Code Status: No Order   Reason for Admission: pancreatitis, intractable vomiting   Abdominal pain    [facility] day: 1   Problem List:       Hospital Problems  Never Reviewed          Codes Class Noted POA    Pancreatitis, acute ICD-10-CM: K85.90  ICD-9-CM: 977.9  3/24/2017 Yes        Abdominal pain, acute, generalized ICD-10-CM: R10.84  ICD-9-CM: 789.07, 338.19  3/24/2017 Yes        Abdominal pain ICD-10-CM: R10.9  ICD-9-CM: 789.00  3/28/2014 Unknown              BACKGROUND:    Past Medical History:   Past Medical History:   Diagnosis Date    Bipolar 1 disorder, depressed ([facility] [address]     Neurological disorder     Psychiatric disorder          Patient taking anticoagulants yes     ASSESSMENT:    Changes in Assessment Throughout Shift: No     Patient has Central Line: no Reasons if yes:    Patient has Ramos Cath: no Reasons if yes:       Last Vitals:     Vitals:    03/27/17 0356 03/27/17 0755 03/27/17 1215 03/27/17 1635   BP: 105/67 100/60 111/44 128/74   Pulse: 60 61 67 73   Resp: 18 20 20 16   Temp: 98.4 °F (36.9 °C) 98.8 °F (37.1 °C) 99.2 °F (37.3 °C) 97.8 °F (36.6 °C)   SpO2: 99% 100% 98% 100%   Weight:       Height:            IV and DRAINS (will only show if present)   Peripheral IV 03/25/17 Right Forearm-Site Assessment: Clean, dry, & intact  [REMOVED] Peripheral IV 03/23/17 Right Antecubital-Site Assessment: Clean, dry, & intact     WOUND (if present)   Wound Type:  none   Dressing present Dressing Present : No   Wound Concerns/Notes:  none     PAIN    Pain Assessment    Pain Intensity 1: 10 (03/27/17 1318)    Pain Location 1: Abdomen    Pain Intervention(s) 1: Medication (see MAR)    Patient Stated Pain Goal: 0  o Interventions for Pain:  none, Morphine  o Intervention effective: yes  o Time of last intervention: 9386   o Reassessment Completed: yes      Last 3 Weights:  Last 3 Recorded Weights in this Encounter    03/23/17 1720   Weight: 52.2 kg (115 lb)     Weight change:      INTAKE/OUPUT    Current Shift: 03/27 0701 - 03/27 1900  In: 290.4 [I.V.:290.4]  Out: -     Last three shifts: 03/25 1901 - 03/27 0700  In: 3963.8 [P.O.:920; I.V.:3043.8]  Out: 1475 [Urine:1475]     LAB RESULTS     Recent Labs      03/27/17   0331 03/26/17 0310 03/25/17 0310   WBC  7.2  5.9  6.7   HGB  13.7  13.1  11.7*   HCT  39.6  38.2  34.9*   PLT  195  204  183        Recent Labs      03/27/17   0331 03/26/17 0310 03/25/17 0310   NA  139  141  141   K  3.7  3.7  3.3*   GLU  81  117*  69*   BUN  7  6*  5*   CREA  0.82  0.86  0.65   CA  8.9  8.4*  8.1*   MG  2.5*  2.2  1.9       RECOMMENDATIONS AND DISCHARGE PLANNING     1. Pending tests/procedures/ Plan of Care or Other Needs:      2. Discharge plan for patient and Needs/Barriers:     3. Estimated Discharge Date:  Posted on Whiteboard in Patients Room: no      4. The patient's care plan was reviewed with the oncoming nurse. \"HEALS\" SAFETY CHECK      Fall Risk    Total Score: 1    Safety Measures: Safety Measures: Bed/Chair-Wheels locked, Bed in low position, Call light within reach, Gripper socks, Side rails X2    A safety check occurred in the patient's room between off going nurse and oncoming nurse listed above.     The safety check included the below items  Area Items   H  High Alert Medications - Verify all high alert medication drips (heparin, PCA, etc.)   E  Equipment - Suction is set up for ALL patients (with yanker)  - Red plugs utilized for all equipment (IV pumps, etc.)  - WOWs wiped down at end of shift.  - Room stocked with oxygen, suction, and other unit-specific supplies   A  Alarms - Bed alarm is set for fall risk patients  - Ensure chair alarm is in place and activated if patient is up in a chair   L  Lines - Check IV for any infiltration  - Ramos bag is empty if patient has a Ramos   - Tubing and IV bags are labeled   S  Safety   - Room is clean, patient is clean, and equipment is clean. - Hallways are clear from equipment besides carts. - Fall bracelet on for fall risk patients  - Ensure room is clear and free of clutter  - Suction is set up for ALL patients (with winsome)  - Hallways are clear from equipment besides carts.    - Isolation precautions followed, supplies available outside room, sign posted     Lisa Marie

## 2017-03-27 NOTE — PROGRESS NOTES
Bedside and Verbal shift change report given to [facility] [address] (oncoming nurse) by Marquis Rell RN (offgoing nurse). Report included the following information SBAR, Kardex, MAR and Recent Results.     SITUATION:    Code Status: No Order   Reason for Admission: pancreatitis, intractable vomiting   Abdominal pain    [facility] day: 0   Problem List:       Hospital Problems  Never Reviewed          Codes Class Noted POA    Pancreatitis, acute ICD-10-CM: K85.90  ICD-9-CM: 220.4  3/24/2017 Yes        Abdominal pain, acute, generalized ICD-10-CM: R10.84  ICD-9-CM: 789.07, 338.19  3/24/2017 Yes        Abdominal pain ICD-10-CM: R10.9  ICD-9-CM: 789.00  3/28/2014 Unknown              BACKGROUND:    Past Medical History:   Past Medical History:   Diagnosis Date    Bipolar 1 disorder, depressed ([facility] [address]     Neurological disorder     Psychiatric disorder          Patient taking anticoagulants yes     ASSESSMENT:    Changes in Assessment Throughout Shift: NO     Patient has Central Line: no Reasons if yes: NO   Patient has Ramos Cath: no Reasons if yes: NO      Last Vitals:     Vitals:    03/26/17 0422 03/26/17 0759 03/26/17 1130 03/26/17 1535   BP: 117/75 (!) 158/98 120/73 133/66   Pulse: 60 61 60 65   Resp: 18 18 17 16   Temp: 98.8 °F (37.1 °C) 98.5 °F (36.9 °C) 99 °F (37.2 °C) 98.4 °F (36.9 °C)   SpO2: 99% 100% 100% 100%   Weight:       Height:            IV and DRAINS (will only show if present)   Peripheral IV 03/25/17 Right Forearm-Site Assessment: Clean, dry, & intact  [REMOVED] Peripheral IV 03/23/17 Right Antecubital-Site Assessment: Clean, dry, & intact     WOUND (if present)   Wound Type:  none, NONE   Dressing present Dressing Present : No   Wound Concerns/Notes:  none     PAIN    Pain Assessment    Pain Intensity 1: 10 (03/26/17 1504)    Pain Location 1: Abdomen    Pain Intervention(s) 1: Medication (see MAR)    Patient Stated Pain Goal: 0  o Interventions for Pain:  none, MEDS  o Intervention effective: yes  o Time of last intervention: SEE MAR   o Reassessment Completed: yes      Last 3 Weights:  Last 3 Recorded Weights in this Encounter    03/23/17 1720   Weight: 52.2 kg (115 lb)     Weight change:      INTAKE/OUPUT    Current Shift:      Last three shifts: 03/25 0701 - 03/26 1900  In: 800 [P.O.:800]  Out: 2475 [Urine:2475]     LAB RESULTS     Recent Labs      03/26/17 0310  03/25/17 0310   WBC  5.9  6.7   HGB  13.1  11.7*   HCT  38.2  34.9*   PLT  204  183        Recent Labs      03/26/17 0310  03/25/17 0310   NA  141  141   K  3.7  3.3*   GLU  117*  69*   BUN  6*  5*   CREA  0.86  0.65   CA  8.4*  8.1*   MG  2.2  1.9       RECOMMENDATIONS AND DISCHARGE PLANNING     1. Pending tests/procedures/ Plan of Care or Other Needs: LABS     2. Discharge plan for patient and Needs/Barriers: HOME    3. Estimated Discharge Date: 3/27/17 Posted on Whiteboard in Patients Room: YES      4. The patient's care plan was reviewed with the oncoming nurse. \"HEALS\" SAFETY CHECK      Fall Risk    Total Score: 1    Safety Measures: Safety Measures: Bed in low position, Bed/Chair-Wheels locked, Caregiver at bedside, Call light within reach    A safety check occurred in the patient's room between off going nurse and oncoming nurse listed above.     The safety check included the below items  Area Items   H  High Alert Medications - Verify all high alert medication drips (heparin, PCA, etc.)   E  Equipment - Suction is set up for ALL patients (with yanker)  - Red plugs utilized for all equipment (IV pumps, etc.)  - WOWs wiped down at end of shift.  - Room stocked with oxygen, suction, and other unit-specific supplies   A  Alarms - Bed alarm is set for fall risk patients  - Ensure chair alarm is in place and activated if patient is up in a chair   L  Lines - Check IV for any infiltration  - Ramos bag is empty if patient has a Ramos   - Tubing and IV bags are labeled   S  Safety   - Room is clean, patient is clean, and equipment is clean. - Hallways are clear from equipment besides carts. - Fall bracelet on for fall risk patients  - Ensure room is clear and free of clutter  - Suction is set up for ALL patients (with travisker)  - Hallways are clear from equipment besides carts.    - Isolation precautions followed, supplies available outside room, sign posted     Chrissy Marisel Jerry RN

## 2017-03-27 NOTE — ROUTINE PROCESS
Bedside and Verbal shift change report given to Lela (oncoming nurse) by Ru Benitez RN (offgoing nurse). Report included the following information SBAR, Kardex, MAR and Recent Results. SITUATION:    Code Status: No Order   Reason for Admission: pancreatitis, intractable vomiting   Abdominal pain    [facility] day: 1   Problem List:       Hospital Problems  Never Reviewed          Codes Class Noted POA    Pancreatitis, acute ICD-10-CM: K85.90  ICD-9-CM: 589.9  3/24/2017 Yes        Abdominal pain, acute, generalized ICD-10-CM: R10.84  ICD-9-CM: 789.07, 338.19  3/24/2017 Yes        Abdominal pain ICD-10-CM: R10.9  ICD-9-CM: 789.00  3/28/2014 Unknown              BACKGROUND:    Past Medical History:   Past Medical History:   Diagnosis Date    Bipolar 1 disorder, depressed ([facility] [address]     Neurological disorder     Psychiatric disorder          Patient taking anticoagulants yes     ASSESSMENT:    Changes in Assessment Throughout Shift: none     Patient has Central Line: no Reasons if yes: .  Patient has Ramos Cath: no Reasons if yes: .       Last Vitals:     Vitals:    03/26/17 1535 03/26/17 2016 03/27/17 0356 03/27/17 0755   BP: 133/66 125/71 105/67 100/60   Pulse: 65 63 60 61   Resp: 16 16 18 20   Temp: 98.4 °F (36.9 °C) 98.6 °F (37 °C) 98.4 °F (36.9 °C) 98.8 °F (37.1 °C)   SpO2: 100% 100% 99% 100%   Weight:       Height:            IV and DRAINS (will only show if present)   Peripheral IV 03/25/17 Right Forearm-Site Assessment: Clean, dry, & intact  [REMOVED] Peripheral IV 03/23/17 Right Antecubital-Site Assessment: Clean, dry, & intact     WOUND (if present)   Wound Type:  none   Dressing present Dressing Present : No   Wound Concerns/Notes:  none     PAIN    Pain Assessment    Pain Intensity 1: 0 (03/27/17 0445)    Pain Location 1: Abdomen    Pain Intervention(s) 1: Medication (see MAR)    Patient Stated Pain Goal: 0  o Interventions for Pain:  dilaudid  o Intervention effective: yes  o Time of last intervention: 7737   o Reassessment Completed: yes      Last 3 Weights:  Last 3 Recorded Weights in this Encounter    03/23/17 1720   Weight: 52.2 kg (115 lb)     Weight change:      INTAKE/OUPUT    Current Shift:      Last three shifts: 03/25 1901 - 03/27 0700  In: 3963.8 [P.O.:920; I.V.:3043.8]  Out: 1475 [Urine:1475]     LAB RESULTS     Recent Labs      03/27/17   0331 03/26/17 0310 03/25/17 0310   WBC  7.2  5.9  6.7   HGB  13.7  13.1  11.7*   HCT  39.6  38.2  34.9*   PLT  195  204  183        Recent Labs      03/27/17 0331 03/26/17 0310 03/25/17 0310   NA  139  141  141   K  3.7  3.7  3.3*   GLU  81  117*  69*   BUN  7  6*  5*   CREA  0.82  0.86  0.65   CA  8.9  8.4*  8.1*   MG  2.5*  2.2  1.9       RECOMMENDATIONS AND DISCHARGE PLANNING     1. Pending tests/procedures/ Plan of Care or Other Needs: none     2. Discharge plan for patient and Needs/Barriers: home    3. Estimated Discharge Date: 3/30/2017 Posted on Whiteboard in Hospitals in [place]: yes      4. The patient's care plan was reviewed with the oncoming nurse. \"HEALS\" SAFETY CHECK      Fall Risk    Total Score: 1    Safety Measures: Safety Measures: Bed/Chair-Wheels locked, Bed in low position, Call light within reach, Gripper socks, Side rails X2    A safety check occurred in the patient's room between off going nurse and oncoming nurse listed above.     The safety check included the below items  Area Items   H  High Alert Medications - Verify all high alert medication drips (heparin, PCA, etc.)   E  Equipment - Suction is set up for ALL patients (with yanker)  - Red plugs utilized for all equipment (IV pumps, etc.)  - WOWs wiped down at end of shift.  - Room stocked with oxygen, suction, and other unit-specific supplies   A  Alarms - Bed alarm is set for fall risk patients  - Ensure chair alarm is in place and activated if patient is up in a chair   L  Lines - Check IV for any infiltration  - Ramos bag is empty if patient has a Ramos - Tubing and IV bags are labeled   S  Safety   - Room is clean, patient is clean, and equipment is clean. - Hallways are clear from equipment besides carts. - Fall bracelet on for fall risk patients  - Ensure room is clear and free of clutter  - Suction is set up for ALL patients (with yanker)  - Hallways are clear from equipment besides carts.    - Isolation precautions followed, supplies available outside room, sign posted     Kevin Nunez RN

## 2017-03-27 NOTE — PROGRESS NOTES
Progress Note    Patient: Katherine Montemayor MRN: [id number]  CSN: [id number]    YOB: 1990  Age: 23 y.o. Sex: female    DOA: 3/23/2017 LOS:  LOS: 1 day                    Subjective:     Pt still c/o abdominal pain. Lt upper quadrant  And Rt lower quadrant had mag citrate  And dulcolax prn pt had 2 loose  BM yesterday. pt  still has abdominal pain. Pt still on IV fluid discussed with Dr Freya Palacios  Might need to repeat CT scan he will evaluate today. Pt on lower dose of morphin Iv 0.5 mg q4h prn      Hospital course  Katherine Montemayor is a 23 y.o.  female who has h/o poor compliant last seen at my office 3 years ago . Pt presented to ER c/o nausea and vomiting x 20 since waking up this morning. Pt also c/o RLQ,  abdominal pain that radiates to the right flank. Pt also has Lt upper quadrant pain      Pt also c/o vaginal spotting but states that it is because she is due for Depovera shot. She has been f/u women care at [address] [place] [address]  She denies any dysuria, frequency, vaginal discharge, diarrhea, or fever associated with her symptoms. Pt has surgival hx of Morina removal from abdomen and  section.      She states that she is allergic to Doxycycline and Latex. Pt admits to occasional tobacco use and alcohol use. she reported she had one hard Drink 2 days ago     She denies any illicit drug use. The patient reports no other symptoms and has no other concerns at this time.      CT scan abdomen and pelvis  Moderate amount of retained stool in proximal and mid colon.      Normal appendix identified.      No definable acute processes in abdomen or in pelvis.          Vaginal U/S  1. Normal uterus and endometrial stripe   2. Normal ovaries. 3. Small amount of free fluid in the cul-de-sac. CXR  Negative study.      EKG pending  U/S gallbladder    No diagnostic abnormality as described. Urine drug screen positive THC and opiates. Blood culture negative   GC/CT urine culture is negative .  Potassium normal today . CXR negative,   Chief Complaint:   Chief Complaint   Patient presents with    Abdominal Pain    Nausea    Vomiting       Review of systems  GENERAL: Patient alert, awake and oriented times 3, able to communicate full sentences and not in distress. HEENT: No change in vision, no earache, tinnitus, sore throat or sinus congestion. NECK: No pain or stiffness. CARDIOVASCULAR: No chest pain or pressure. No palpitations. PULMONARY: No shortness of breath, cough or wheeze. GASTROINTESTINAL: positive  abdominal pain, positive  nausea,no vomiting no  diarrhea, melena or bright red blood per rectum. positive constipation  GENITOURINARY: No urinary frequency, urgency, hesitancy or dysuria. MUSCULOSKELETAL: No joint or muscle pain, positive  back pain, no recent trauma. DERMATOLOGIC: No rash, no itching, no lesions. ENDOCRINE: No polyuria, polydipsia, no heat or cold intolerance. No recent change in weight. HEMATOLOGICAL: No anemia or easy bruising or bleeding. NEUROLOGIC: No headache, seizures, numbness, tingling or weakness. PSYCHIATRIC: No depression, anxiety, H/O Bipolar, no loss of interest in normal activity or change in sleep pattern    Objective:     Physical Exam:  Visit Vitals    /60    Pulse 61    Temp 98.8 °F (37.1 °C)    Resp 20    Ht 4' 8\" (1.422 m)    Wt 52.2 kg (115 lb)    SpO2 100%    Breastfeeding No    BMI 25.78 kg/m2      General:  Alert, cooperative, no distress, appears stated age. Head:  Normocephalic, without obvious abnormality, atraumatic. Eyes:  Conjunctivae/corneas clear. PERRL, EOMs intact. Nose: Nares normal. No drainage or sinus tenderness. Throat: Lips, mucosa, and tongue dry   Neck: Supple, symmetrical, trachea midline, no adenopathy, thyroid: no enlargement/tenderness/nodules, no carotid bruit and no JVD. Back:  ROM normal. No CVA tenderness. Lungs:  Clear to auscultation bilaterally. Chest wall:  No tenderness or deformity. Heart:  Regular rate and rhythm, S1, S2 normal, no murmur, click, rub or gallop. Abdomen: Soft, tender Lt upper quadrant epigastric tenderness . Bowel sounds normal. No masses, No organomegaly. Extremities: Extremities normal, atraumatic, no cyanosis or edema. Pulses: 2+ and symmetric all extremities. Skin: Skin color, texture, turgor normal. No rashes or lesions   Neurologic: CNII-XII intact. No focal motor or sensory deficit. Intake and Output:  Current Shift:     Last three shifts:  03/25 1901 - 03/27 0700  In: 3963.8 [P.O.:920; I.V.:3043.8]  Out: 1475 [JXTBE:4438]    Labs: Results:       Chemistry Recent Labs      03/27/17 0331 03/26/17 0310 03/25/17 0310   GLU  81  117*  69*   NA  139  141  141   K  3.7  3.7  3.3*   CL  103  106  106   CO2  32  24  26   BUN  7  6*  5*   CREA  0.82  0.86  0.65   CA  8.9  8.4*  8.1*   AGAP  4  11  9   BUCR  9*  7*  8*   AP  105  103  96   TP  7.2  7.0  5.7*   ALB  3.8  3.7  3.2*   GLOB  3.4  3.3  2.5   AGRAT  1.1  1.1  1.3      CBC w/Diff Recent Labs      03/27/17 0331 03/26/17 0310 03/25/17 0310   WBC  7.2  5.9  6.7   RBC  4.21  4.06*  3.66*   HGB  13.7  13.1  11.7*   HCT  39.6  38.2  34.9*   PLT  195  204  183   GRANS  46  42  45   LYMPH  42  46  47   EOS  1  1  1      Cardiac Enzymes No results for input(s): CPK, CKND1, GILLES in the last 72 hours. No lab exists for component: CKRMB, TROIP   Coagulation No results for input(s): PTP, INR, APTT in the last 72 hours. No lab exists for component: INREXT, INREXT    Lipid Panel Lab Results   Component Value Date/Time    Triglyceride 106 03/24/2017 03:55 PM      BNP No results for input(s): BNPP in the last 72 hours.    Liver Enzymes Recent Labs      03/27/17   0331   TP  7.2   ALB  3.8   AP  105   SGOT  19      Thyroid Studies Lab Results   Component Value Date/Time    TSH 1.58 03/25/2017 03:10 AM          Procedures/imaging: see electronic medical records for all procedures/Xrays and details which were not copied into this note but were reviewed prior to creation of Plan    Medications:   Current Facility-Administered Medications   Medication Dose Route Frequency    morphine injection 0.5 mg  0.5 mg IntraVENous Q4H PRN    0.9% sodium chloride infusion  125 mL/hr IntraVENous CONTINUOUS    docusate sodium (COLACE) capsule 100 mg  100 mg Oral BID    naloxegol (MOVANTIK) tablet 25 mg  25 mg Oral ACB    bisacodyl (DULCOLAX) suppository 10 mg  10 mg Rectal DAILY PRN    enoxaparin (LOVENOX) injection 40 mg  40 mg SubCUTAneous Q24H    ondansetron (ZOFRAN) injection 4 mg  4 mg IntraVENous Q6H PRN    pantoprazole (PROTONIX) injection 40 mg  40 mg IntraVENous Q12H    polyethylene glycol (MIRALAX) packet 17 g  17 g Oral TID       Assessment/Plan     Active Problems:    Abdominal pain (3/28/2014)      Pancreatitis, acute (3/24/2017)      Abdominal pain, acute, generalized (3/24/2017)    Plan    Abdominal pain/ pancreatitis  Lipase back to normal  Continue IV hydration   ultrasound gallbladder negative   F/u GC/CT culture negative  GGT 72    F/U IGG result  is pending  , NATALIA result is pending  Will switch observation to admission      Constipation  Movantik  25 mg daily  Colace 100 mg BID  miralax 17 gm daily  Given mag citrate per GI  Still has abdominal pain       Bipolar D/O  F/u psychiatry as outpatinet      Cbc, cmp, mag  Decrease morphine to 0.5 mg q 4g prn  Decrease IV fluids monitor Bp  Continue zofran prn  OOB to chair with assistant  Increase activity with assistant  Pt and ot evaluation and treatment     DVT and GI prophylaxis Lovenox and protonix    Morgan Salinas MD  3/27/2017 8:08  AM

## 2017-03-27 NOTE — PROGRESS NOTES
Orders received, chart reviewed. PT evaluation attempted, patient is currently off unit for CT. Will follow-up tomorrow.

## 2017-03-27 NOTE — PROGRESS NOTES
[email or web address]  [phone]    Progress Note    Patient: James Smith MRN: [id number]  SSN: [id number]    YOB: 1990  Age: 23 y.o. Sex: female      Admit Date: 3/23/2017    LOS: 1 day     Chief complaint:abd pain     Impression:     1. Acute pancreatitis, lipase now normal-- suspect alcohol related, did admit to heavy ETOH use chronically on adm but denies now. Neg US for biliary source and TGY normal . NATALIA, IgG 4 pending. CT did not show any clear evidence of pancreatitis. 2. Abdominal pain possibly due to constipation-doubt this is related to worsening pancreatic inflammation. Moved her bowels 2-3 times with Mag citrate. 3. Recurrent nausea and vomiting -- pancreatitis vs PUD vs biliary source vs marijuana hyperemesis syndrome-acknowledges need for \"hot showers\"  4. Abnormal GI imaging - transient small bowel dilation on prior CT scans very non specific may want to consider a SBFT as out pt  5. Chronic constipation   6. Drug screen + for opiates and MJ. Interestingly the pt now tells me she is on not on the listed opiod out pt meds. High risk for opioid abuse in my opinion and this is worrysome . 7. Hx of bipolar disorder    Plan:     1. Continue movantik and miralax   2. Can repeat CT today-if no changes, can discharge with further work up to be done as OP  3. Minimize narcotic use   4. OP SBFT      Subjective:     Moved bowels but still has pain, not throwing up as much now.      Objective:     Vitals:    03/26/17 2016 03/27/17 0356 03/27/17 0755 03/27/17 1215   BP: 125/71 105/67 100/60 111/44   Pulse: 63 60 61 67   Resp: 16 18 20 20   Temp: 98.6 °F (37 °C) 98.4 °F (36.9 °C) 98.8 °F (37.1 °C) 99.2 °F (37.3 °C)   SpO2: 100% 99% 100% 98%   Weight:       Height:            Physical Exam:   GENERAL: alert, cooperative, no distress, appears stated age  ABDOMEN: scaphoid     Lab/Data Review:  BMP:   Lab Results   Component Value Date/Time     03/27/2017 03:31 AM    K 3.7 03/27/2017 03:31 AM     03/27/2017 03:31 AM    CO2 32 03/27/2017 03:31 AM    AGAP 4 03/27/2017 03:31 AM    GLU 81 03/27/2017 03:31 AM    BUN 7 03/27/2017 03:31 AM    CREA 0.82 03/27/2017 03:31 AM    GFRAA >60 03/27/2017 03:31 AM    GFRNA >60 03/27/2017 03:31 AM     CMP:   Lab Results   Component Value Date/Time     03/27/2017 03:31 AM    K 3.7 03/27/2017 03:31 AM     03/27/2017 03:31 AM    CO2 32 03/27/2017 03:31 AM    AGAP 4 03/27/2017 03:31 AM    GLU 81 03/27/2017 03:31 AM    BUN 7 03/27/2017 03:31 AM    CREA 0.82 03/27/2017 03:31 AM    GFRAA >60 03/27/2017 03:31 AM    GFRNA >60 03/27/2017 03:31 AM    CA 8.9 03/27/2017 03:31 AM    MG 2.5 (H) 03/27/2017 03:31 AM    ALB 3.8 03/27/2017 03:31 AM    TP 7.2 03/27/2017 03:31 AM    GLOB 3.4 03/27/2017 03:31 AM    AGRAT 1.1 03/27/2017 03:31 AM    SGOT 19 03/27/2017 03:31 AM    ALT 25 03/27/2017 03:31 AM     CBC:   Lab Results   Component Value Date/Time    WBC 7.2 03/27/2017 03:31 AM    HGB 13.7 03/27/2017 03:31 AM    HCT 39.6 03/27/2017 03:31 AM     03/27/2017 03:31 AM     COAGS: No results found for: APTT, PTP, INR  Liver Panel:   Lab Results   Component Value Date/Time    ALB 3.8 03/27/2017 03:31 AM    TP 7.2 03/27/2017 03:31 AM    GLOB 3.4 03/27/2017 03:31 AM    AGRAT 1.1 03/27/2017 03:31 AM    SGOT 19 03/27/2017 03:31 AM    ALT 25 03/27/2017 03:31 AM     03/27/2017 03:31 AM          Active Problems:    Abdominal pain (3/28/2014)      Pancreatitis, acute (3/24/2017)      Abdominal pain, acute, generalized (3/24/2017)          Signed By: Raya Causey MD     March 27, 2017

## 2017-03-27 NOTE — PROGRESS NOTES
Assumed care; Pt resting in bed sleeping; no distress noted; No nonverbal indicator of pain; bed in low position; Side rails up x 2; Call light and personal belonging within reach. Will continue to monitor.    0805: Report given to Sneha)

## 2017-03-28 ENCOUNTER — ANESTHESIA EVENT (OUTPATIENT)
Dept: ENDOSCOPY | Age: 27
DRG: 282 | End: 2017-03-28
Payer: MEDICAID

## 2017-03-28 LAB
ANION GAP BLD CALC-SCNC: 9 MMOL/L (ref 3–18)
BASOPHILS # BLD AUTO: 0 K/UL (ref 0–0.1)
BASOPHILS # BLD: 0 % (ref 0–2)
BUN SERPL-MCNC: 11 MG/DL (ref 7–18)
BUN/CREAT SERPL: 9 (ref 12–20)
CALCIUM SERPL-MCNC: 8.7 MG/DL (ref 8.5–10.1)
CHLORIDE SERPL-SCNC: 106 MMOL/L (ref 100–108)
CO2 SERPL-SCNC: 25 MMOL/L (ref 21–32)
CREAT SERPL-MCNC: 1.2 MG/DL (ref 0.6–1.3)
DIFFERENTIAL METHOD BLD: ABNORMAL
EOSINOPHIL # BLD: 0.1 K/UL (ref 0–0.4)
EOSINOPHIL NFR BLD: 1 % (ref 0–5)
ERYTHROCYTE [DISTWIDTH] IN BLOOD BY AUTOMATED COUNT: 12 % (ref 11.6–14.5)
GLUCOSE SERPL-MCNC: 95 MG/DL (ref 74–99)
HCT VFR BLD AUTO: 38.5 % (ref 35–45)
HGB BLD-MCNC: 13.3 G/DL (ref 12–16)
LIPASE SERPL-CCNC: 294 U/L (ref 73–393)
LYMPHOCYTES # BLD AUTO: 41 % (ref 21–52)
LYMPHOCYTES # BLD: 3 K/UL (ref 0.9–3.6)
MAGNESIUM SERPL-MCNC: 2.4 MG/DL (ref 1.8–2.4)
MCH RBC QN AUTO: 32.5 PG (ref 24–34)
MCHC RBC AUTO-ENTMCNC: 34.5 G/DL (ref 31–37)
MCV RBC AUTO: 94.1 FL (ref 74–97)
MONOCYTES # BLD: 0.9 K/UL (ref 0.05–1.2)
MONOCYTES NFR BLD AUTO: 12 % (ref 3–10)
NEUTS SEG # BLD: 3.5 K/UL (ref 1.8–8)
NEUTS SEG NFR BLD AUTO: 46 % (ref 40–73)
PLATELET # BLD AUTO: 207 K/UL (ref 135–420)
PMV BLD AUTO: 10.9 FL (ref 9.2–11.8)
POTASSIUM SERPL-SCNC: 3.5 MMOL/L (ref 3.5–5.5)
RBC # BLD AUTO: 4.09 M/UL (ref 4.2–5.3)
SODIUM SERPL-SCNC: 140 MMOL/L (ref 136–145)
WBC # BLD AUTO: 7.5 K/UL (ref 4.6–13.2)

## 2017-03-28 PROCEDURE — 80048 BASIC METABOLIC PNL TOTAL CA: CPT | Performed by: FAMILY MEDICINE

## 2017-03-28 PROCEDURE — 83690 ASSAY OF LIPASE: CPT | Performed by: FAMILY MEDICINE

## 2017-03-28 PROCEDURE — 83735 ASSAY OF MAGNESIUM: CPT | Performed by: FAMILY MEDICINE

## 2017-03-28 PROCEDURE — 74011250637 HC RX REV CODE- 250/637: Performed by: FAMILY MEDICINE

## 2017-03-28 PROCEDURE — 74011250636 HC RX REV CODE- 250/636: Performed by: INTERNAL MEDICINE

## 2017-03-28 PROCEDURE — 65270000029 HC RM PRIVATE

## 2017-03-28 PROCEDURE — 97161 PT EVAL LOW COMPLEX 20 MIN: CPT

## 2017-03-28 PROCEDURE — 36415 COLL VENOUS BLD VENIPUNCTURE: CPT | Performed by: FAMILY MEDICINE

## 2017-03-28 PROCEDURE — 74011250637 HC RX REV CODE- 250/637: Performed by: INTERNAL MEDICINE

## 2017-03-28 PROCEDURE — 97165 OT EVAL LOW COMPLEX 30 MIN: CPT

## 2017-03-28 PROCEDURE — 74011250636 HC RX REV CODE- 250/636: Performed by: FAMILY MEDICINE

## 2017-03-28 PROCEDURE — 85025 COMPLETE CBC W/AUTO DIFF WBC: CPT | Performed by: FAMILY MEDICINE

## 2017-03-28 PROCEDURE — 82657 ENZYME CELL ACTIVITY: CPT | Performed by: INTERNAL MEDICINE

## 2017-03-28 PROCEDURE — C9113 INJ PANTOPRAZOLE SODIUM, VIA: HCPCS | Performed by: INTERNAL MEDICINE

## 2017-03-28 RX ORDER — MORPHINE SULFATE 2 MG/ML
2 INJECTION, SOLUTION INTRAMUSCULAR; INTRAVENOUS ONCE
Status: COMPLETED | OUTPATIENT
Start: 2017-03-28 | End: 2017-03-28

## 2017-03-28 RX ORDER — DICYCLOMINE HYDROCHLORIDE 10 MG/1
10 CAPSULE ORAL 4 TIMES DAILY
Status: DISCONTINUED | OUTPATIENT
Start: 2017-03-28 | End: 2017-03-30 | Stop reason: HOSPADM

## 2017-03-28 RX ADMIN — DICYCLOMINE HYDROCHLORIDE 10 MG: 10 CAPSULE ORAL at 22:40

## 2017-03-28 RX ADMIN — Medication 2 MG: at 03:44

## 2017-03-28 RX ADMIN — DICYCLOMINE HYDROCHLORIDE 10 MG: 10 CAPSULE ORAL at 18:45

## 2017-03-28 RX ADMIN — DOCUSATE SODIUM 100 MG: 100 CAPSULE, LIQUID FILLED ORAL at 18:45

## 2017-03-28 RX ADMIN — HYDROCODONE BITARTRATE AND ACETAMINOPHEN 1 TABLET: 5; 325 TABLET ORAL at 15:37

## 2017-03-28 RX ADMIN — ONDANSETRON HYDROCHLORIDE 4 MG: 2 SOLUTION INTRAMUSCULAR; INTRAVENOUS at 09:11

## 2017-03-28 RX ADMIN — HYDROCODONE BITARTRATE AND ACETAMINOPHEN 1 TABLET: 5; 325 TABLET ORAL at 00:45

## 2017-03-28 RX ADMIN — POLYETHYLENE GLYCOL 3350 17 G: 17 POWDER, FOR SOLUTION ORAL at 08:53

## 2017-03-28 RX ADMIN — HYDROCODONE BITARTRATE AND ACETAMINOPHEN 1 TABLET: 5; 325 TABLET ORAL at 09:13

## 2017-03-28 RX ADMIN — NALOXEGOL OXALATE 25 MG: 25 TABLET, FILM COATED ORAL at 08:53

## 2017-03-28 RX ADMIN — HYDROCODONE BITARTRATE AND ACETAMINOPHEN 1 TABLET: 5; 325 TABLET ORAL at 12:13

## 2017-03-28 RX ADMIN — POLYETHYLENE GLYCOL 3350 17 G: 17 POWDER, FOR SOLUTION ORAL at 22:40

## 2017-03-28 RX ADMIN — DOCUSATE SODIUM 100 MG: 100 CAPSULE, LIQUID FILLED ORAL at 08:53

## 2017-03-28 RX ADMIN — PANTOPRAZOLE SODIUM 40 MG: 40 INJECTION, POWDER, FOR SOLUTION INTRAVENOUS at 22:40

## 2017-03-28 RX ADMIN — PANTOPRAZOLE SODIUM 40 MG: 40 INJECTION, POWDER, FOR SOLUTION INTRAVENOUS at 08:53

## 2017-03-28 RX ADMIN — ONDANSETRON HYDROCHLORIDE 4 MG: 2 SOLUTION INTRAMUSCULAR; INTRAVENOUS at 18:45

## 2017-03-28 RX ADMIN — HYDROCODONE BITARTRATE AND ACETAMINOPHEN 1 TABLET: 5; 325 TABLET ORAL at 22:40

## 2017-03-28 RX ADMIN — ENOXAPARIN SODIUM 40 MG: 40 INJECTION SUBCUTANEOUS at 08:53

## 2017-03-28 RX ADMIN — HYDROCODONE BITARTRATE AND ACETAMINOPHEN 1 TABLET: 5; 325 TABLET ORAL at 18:45

## 2017-03-28 NOTE — PROGRESS NOTES
Problem: Mobility Impaired (Adult and Pediatric)  Goal: *Acute Goals and Plan of Care (Insert Text)  Physical Therapy Goals  Initiated 3/28/2017 and to be accomplished within 7 day(s)  1. Patient will transfer from bed to chair and chair to bed with independence. 2. Patient will perform sit to stand with independence. 3. Patient will ambulate with independence for 300 feet. PHYSICAL THERAPY EVALUATION     Patient: Ina Lopez (23 y.o. female)  Date: 3/28/2017  Primary Diagnosis: pancreatitis, intractable vomiting  Abdominal pain        Precautions:   Fall      ASSESSMENT :  Based on the objective data described below, the patient presents with decreased dynamic standing balance and decreased activity tolerance resulting in decreased independence with functional mobility. Pt demonstrated an antalgic gait pattern with complaints of pain in R hamstrings which decreased as she ambulated. Patient required one standing rest break due to fatigue. Pt returned to supine in bed at end of evaluation. Patient will benefit from skilled intervention to address the above impairments.   Patients rehabilitation potential is considered to be Good  Factors which may influence rehabilitation potential include:   [ ]         None noted  [ ]         Mental ability/status  [X]         Medical condition  [ ]         Home/family situation and support systems  [ ]         Safety awareness  [X]         Pain tolerance/management  [ ]         Other:        PLAN :  Recommendations and Planned Interventions:  [X]           Bed Mobility Training             [X]    Neuromuscular Re-Education  [X]           Transfer Training                   [ ]    Orthotic/Prosthetic Training  [X]           Gait Training                          [ ]    Modalities  [X]           Therapeutic Exercises          [ ]    Edema Management/Control  [X]           Therapeutic Activities            [X]    Patient and Family Training/Education  [ ] Other (comment):     Frequency/Duration: Patient will be followed by physical therapy 1-2 times per day/4-7 days per week to address goals. Discharge Recommendations: none  Further Equipment Recommendations for Discharge: N/A       G-CODES       Mobility  Current  CJ= 20-39%   Goal  CH= 0%. The severity rating is based on the Level of Assistance required for Functional Mobility and ADLs. SUBJECTIVE:   Patient stated My stomach hurts so bad.       OBJECTIVE DATA SUMMARY:       Past Medical History:   Diagnosis Date    Bipolar 1 disorder, depressed ([facility] [address]      Neurological disorder      Psychiatric disorder     History reviewed. No pertinent surgical history.   Prior Level of Function/Home Situation: independent with mobility without assistive device  Home Situation  Home Environment: Private residence  One/Two Story Residence: Other (Comment)  Living Alone: No  Support Systems: Family member(s)  Patient Expects to be Discharged to[de-identified] Private residence  Current DME Used/Available at Home: None  Critical Behavior:  Neurologic State: Alert  Orientation Level: Oriented X4  Cognition: Follows commands        Strength:    Strength: Generally decreased, functional (B LEs)      Tone & Sensation:   Tone: Normal (B LEs)   Sensation: Intact (B LEs)     Range Of Motion:  AROM: Within functional limits (B LEs)   Functional Mobility:  Bed Mobility:  Rolling: Independent  Supine to Sit: Independent  Sit to Supine: Independent  Scooting: Independent  Transfers:  Sit to Stand: Stand-by asssistance  Stand to Sit: Stand-by asssistance  Balance:   Sitting: Intact  Standing: Without support  Standing - Static: Good  Standing - Dynamic : Fair  Ambulation/Gait Training:  Distance (ft): 100 Feet (ft) (with one standing rest break)  Assistive Device:  (none)  Ambulation - Level of Assistance: Stand-by asssistance  Gait Abnormalities: Antalgic;Decreased step clearance     Pain:  Pt reports 8/10 pain or discomfort prior to treatment. Pt reports 8/10 pain or discomfort post treatment. Activity Tolerance:   Fair-  Please refer to the flowsheet for vital signs taken during this treatment. After treatment:   [ ]         Patient left in no apparent distress sitting up in chair  [X]         Patient left in no apparent distress in bed  [X]         Call bell left within reach  [X]         Nursing notified  [ ]         Caregiver present  [ ]         Bed alarm activated      COMMUNICATION/EDUCATION:   [X]         Fall prevention education was provided and the patient/caregiver indicated understanding. [X]         Patient/family have participated as able in goal setting and plan of care. [X]         Patient/family agree to work toward stated goals and plan of care. [ ]         Patient understands intent and goals of therapy, but is neutral about his/her participation. [ ]         Patient is unable to participate in goal setting and plan of care.   Educated patient on activity pacing     Thank you for this referral.  Chon Noble, PT   Time Calculation: 10 mins

## 2017-03-28 NOTE — PROGRESS NOTES
0300- pt complained that abdominal pain was not well controlled by PO med, received one time order from MD for 2 mg morphine. 0422- No vomiting. Nausea well controlled with IV pain medication. Ambulatory to  with no assistance.

## 2017-03-28 NOTE — ROUTINE PROCESS
Bedside and Verbal shift change report given to [address] Britney Jones Se (oncoming nurse) by Babs Quinones RN (offgoing nurse). Report included the following information SBAR, Kardex, MAR and Recent Results.     SITUATION:    Code Status: No Order   Reason for Admission: pancreatitis, intractable vomiting   Abdominal pain    [facility] day: 2   Problem List:       Hospital Problems  Never Reviewed          Codes Class Noted POA    Pancreatitis, acute ICD-10-CM: K85.90  ICD-9-CM: 248.5  3/24/2017 Yes        Abdominal pain, acute, generalized ICD-10-CM: R10.84  ICD-9-CM: 789.07, 338.19  3/24/2017 Yes        Abdominal pain ICD-10-CM: R10.9  ICD-9-CM: 789.00  3/28/2014 Unknown              BACKGROUND:    Past Medical History:   Past Medical History:   Diagnosis Date    Bipolar 1 disorder, depressed ([facility] [address]     Neurological disorder     Psychiatric disorder          Patient taking anticoagulants yes     ASSESSMENT:    Changes in Assessment Throughout Shift: none     Patient has Central Line: no Reasons if yes:    Patient has Ramos Cath: no Reasons if yes:       Last Vitals:     Vitals:    03/27/17 0755 03/27/17 1215 03/27/17 1635 03/27/17 2000   BP: 100/60 111/44 128/74 135/80   Pulse: 61 67 73 70   Resp: 20 20 16 18   Temp: 98.8 °F (37.1 °C) 99.2 °F (37.3 °C) 97.8 °F (36.6 °C) 97.5 °F (36.4 °C)   SpO2: 100% 98% 100% 100%   Weight:       Height:            IV and DRAINS (will only show if present)   [REMOVED] Peripheral IV 03/25/17 Right Forearm-Site Assessment: Clean, dry, & intact  Peripheral IV 03/27/17 Left Wrist-Site Assessment: Clean, dry, & intact  [REMOVED] Peripheral IV 03/23/17 Right Antecubital-Site Assessment: Clean, dry, & intact     WOUND (if present)   Wound Type:  none   Dressing present Dressing Present : No   Wound Concerns/Notes:  none     PAIN    Pain Assessment    Pain Intensity 1: 0 (03/28/17 0413)    Pain Location 1: Abdomen    Pain Intervention(s) 1: Medication (see MAR)    Patient Stated Pain Goal: 0  o Interventions for Pain:Yes  o Intervention effective: yes  o Time of last intervention: See MAR   o Reassessment Completed: yes      Last 3 Weights:  Last 3 Recorded Weights in this Encounter    03/23/17 1720   Weight: 52.2 kg (115 lb)     Weight change:      INTAKE/OUPUT    Current Shift: 03/27 1901 - 03/28 0700  In: 1203.8 [I.V.:1203.8]  Out: -     Last three shifts: 03/26 0701 - 03/27 1900  In: 4584.2 [P.O.:1250; I.V.:3334.2]  Out: 1025 [Urine:1025]     LAB RESULTS     Recent Labs      03/27/17   0331  03/26/17   0310   WBC  7.2  5.9   HGB  13.7  13.1   HCT  39.6  38.2   PLT  195  204        Recent Labs      03/27/17   0331  03/26/17   0310   NA  139  141   K  3.7  3.7   GLU  81  117*   BUN  7  6*   CREA  0.82  0.86   CA  8.9  8.4*   MG  2.5*  2.2       RECOMMENDATIONS AND DISCHARGE PLANNING     1. Pending tests/procedures/ Plan of Care or Other Needs: yes     2. Discharge plan for patient and Needs/Barriers: TBD    3. Estimated Discharge Date: TBD Posted on Whiteboard in Patients Room: no      4. The patient's care plan was reviewed with the oncoming nurse. \"HEALS\" SAFETY CHECK      Fall Risk    Total Score: 1    Safety Measures: Safety Measures: Bed/Chair-Wheels locked, Bed in low position, Call light within reach, Side rails X 3    A safety check occurred in the patient's room between off going nurse and oncoming nurse listed above.     The safety check included the below items  Area Items   H  High Alert Medications - Verify all high alert medication drips (heparin, PCA, etc.)   E  Equipment - Suction is set up for ALL patients (with yanker)  - Red plugs utilized for all equipment (IV pumps, etc.)  - WOWs wiped down at end of shift.  - Room stocked with oxygen, suction, and other unit-specific supplies   A  Alarms - Bed alarm is set for fall risk patients  - Ensure chair alarm is in place and activated if patient is up in a chair   L  Lines - Check IV for any infiltration  - Ramos bag is empty if patient has a Ramos   - Tubing and IV bags are labeled   S  Safety   - Room is clean, patient is clean, and equipment is clean. - Hallways are clear from equipment besides carts. - Fall bracelet on for fall risk patients  - Ensure room is clear and free of clutter  - Suction is set up for ALL patients (with winsome)  - Hallways are clear from equipment besides carts.    - Isolation precautions followed, supplies available outside room, sign posted     Diana Burks RN

## 2017-03-28 NOTE — ROUTINE PROCESS
Bedside and Verbal shift change report given to Ashley (oncoming nurse) by Robre Huerta (offgoing nurse). Report included the following information SBAR, Kardex, MAR and Recent Results.     SITUATION:    Code Status: No Order   Reason for Admission: pancreatitis, intractable vomiting   Abdominal pain   Abd pain    [facility] day: 2   Problem List:       Hospital Problems  Never Reviewed          Codes Class Noted POA    Pancreatitis, acute ICD-10-CM: K85.90  ICD-9-CM: 887.0  3/24/2017 Yes        Abdominal pain, acute, generalized ICD-10-CM: R10.84  ICD-9-CM: 789.07, 338.19  3/24/2017 Yes        Abdominal pain ICD-10-CM: R10.9  ICD-9-CM: 789.00  3/28/2014 Unknown              BACKGROUND:    Past Medical History:   Past Medical History:   Diagnosis Date    Bipolar 1 disorder, depressed ([facility] [address]     Neurological disorder     Psychiatric disorder          Patient taking anticoagulants yes     ASSESSMENT:    Changes in Assessment Throughout Shift: No     Patient has Central Line: no Reasons if yes:    Patient has Ramos Cath: no Reasons if yes:       Last Vitals:     Vitals:    03/28/17 0400 03/28/17 0750 03/28/17 1146 03/28/17 1623   BP: 118/70 118/62 155/78 117/73   Pulse: 67 63 72 68   Resp: 18 17 18 17   Temp: 99.1 °F (37.3 °C) 98.7 °F (37.1 °C) 99.4 °F (37.4 °C) 99 °F (37.2 °C)   SpO2: 99% 100% 100% 100%   Weight:       Height:            IV and DRAINS (will only show if present)   [REMOVED] Peripheral IV 03/25/17 Right Forearm-Site Assessment: Clean, dry, & intact  Peripheral IV 03/27/17 Left Wrist-Site Assessment: Clean, dry, & intact  [REMOVED] Peripheral IV 03/23/17 Right Antecubital-Site Assessment: Clean, dry, & intact     WOUND (if present)   Wound Type:  none   Dressing present Dressing Present : No   Wound Concerns/Notes:  none     PAIN    Pain Assessment    Pain Intensity 1: 9 (03/28/17 1200)    Pain Location 1: Abdomen    Pain Intervention(s) 1: Medication (see MAR)    Patient Stated Pain Goal: 0  o Interventions for Pain:  none, Percocet  o Intervention effective: no  o Time of last intervention: 1900   o Reassessment Completed: yes      Last 3 Weights:  Last 3 Recorded Weights in this Encounter    03/23/17 1720   Weight: 52.2 kg (115 lb)     Weight change:      INTAKE/OUPUT    Current Shift:      Last three shifts: 03/27 0701 - 03/28 1900  In: 2204.2 [P.O.:710; I.V.:1494.2]  Out: 750 [Urine:750]     LAB RESULTS     Recent Labs      03/28/17   0330  03/27/17   0331 03/26/17   0310   WBC  7.5  7.2  5.9   HGB  13.3  13.7  13.1   HCT  38.5  39.6  38.2   PLT  207  195  204        Recent Labs      03/28/17 0330 03/27/17 0331 03/26/17 0310   NA  140  139  141   K  3.5  3.7  3.7   GLU  95  81  117*   BUN  11  7  6*   CREA  1.20  0.82  0.86   CA  8.7  8.9  8.4*   MG  2.4  2.5*  2.2       RECOMMENDATIONS AND DISCHARGE PLANNING     1. Pending tests/procedures/ Plan of Care or Other Needs:      2. Discharge plan for patient and Needs/Barriers:     3. Estimated Discharge Date:  Posted on Whiteboard in Patients Room: no      4. The patient's care plan was reviewed with the oncoming nurse. \"HEALS\" SAFETY CHECK      Fall Risk    Total Score: 1    Safety Measures: Safety Measures: Bed in low position, Bed/Chair-Wheels locked, Caregiver at bedside, Call light within reach    A safety check occurred in the patient's room between off going nurse and oncoming nurse listed above.     The safety check included the below items  Area Items   H  High Alert Medications - Verify all high alert medication drips (heparin, PCA, etc.)   E  Equipment - Suction is set up for ALL patients (with yanker)  - Red plugs utilized for all equipment (IV pumps, etc.)  - WOWs wiped down at end of shift.  - Room stocked with oxygen, suction, and other unit-specific supplies   A  Alarms - Bed alarm is set for fall risk patients  - Ensure chair alarm is in place and activated if patient is up in a chair   L  Lines - Check IV for any infiltration  - Ramos bag is empty if patient has a Ramos   - Tubing and IV bags are labeled   S  Safety   - Room is clean, patient is clean, and equipment is clean. - Hallways are clear from equipment besides carts. - Fall bracelet on for fall risk patients  - Ensure room is clear and free of clutter  - Suction is set up for ALL patients (with travisker)  - Hallways are clear from equipment besides carts.    - Isolation precautions followed, supplies available outside room, sign posted     Jackelyn Fragoso

## 2017-03-28 NOTE — PROGRESS NOTES
Problem: Self Care Deficits Care Plan (Adult)  Goal: *Acute Goals and Plan of Care (Insert Text)  Outcome: Resolved/Met Date Met:  03/28/17  OCCUPATIONAL THERAPY EVALUATION/DISCHARGE     Patient: Joanna Luis (23 y.o. female)  Date: 3/28/2017  Primary Diagnosis: pancreatitis, intractable vomiting  Abdominal pain        Precautions:   Fall      ASSESSMENT AND RECOMMENDATIONS:  Based on the objective data described below, the patient needed supervision/SBA  with functional mobility with no AD and with LE self care tasks. Patient had WFL BUE AROM and  strength. Patient needed SBA with simulated toilet transfer. Patient deferred to PT for mobility. Skilled acute care occupational therapy is not indicated at this time. Discharge Recommendations: Home Health  Further Equipment Recommendations for Discharge: N/A       COMPLEXITY      Eval Complexity: History: LOW Complexity : Brief history review ; Examination: LOW Complexity : 1-3 performance deficits relating to physical, cognitive , or psychosocial skils that result in activity limitations and / or participation restrictions ; Decision Making:LOW Complexity : No comorbidities that affect functional and no verbal or physical assistance needed to complete eval tasks  Assessment: Low Complexity          G-CODES:      Self Care  Current  CI= 1-19%   Goal  CI= 1-19%   D/C  CI= 1-19%. The severity rating is based on the Level of Assistance required for Functional Mobility and ADLs. SUBJECTIVE:   Patient stated My stomach just hurts.       OBJECTIVE DATA SUMMARY:       Past Medical History:   Diagnosis Date    Bipolar 1 disorder, depressed ([facility] [address]      Neurological disorder      Psychiatric disorder     History reviewed. No pertinent surgical history. Prior Level of Function/Home Situation: Patient reported she was independent in basic self care tasks and functional mobility PTA.   Home Situation  Home Environment: Private residence  One/Two Story Residence: Other (Comment)  Living Alone: No  Support Systems: Family member(s)  Patient Expects to be Discharged to[de-identified] Private residence  Current DME Used/Available at Home: None  [X]     Right hand dominant       [ ]     Left hand dominant  Cognitive/Behavioral Status:  Neurologic State: Alert  Orientation Level: Oriented X4  Cognition: Follows commands        Skin: No skin changes noted      Edema: No edema noted     Vision/Perceptual:       Acuity: Within Defined Limits       Coordination:  Coordination: Within functional limits (BUEs)     Balance:  Sitting: Intact  Standing: Intact     Strength:  Strength: Within functional limits ( strength)     Tone & Sensation:  Tone: Normal (BUEs)  Sensation: Intact (BUEs)     Range of Motion:  AROM: Within functional limits (BUEs)     Functional Mobility and Transfers for ADLs:  Bed Mobility:  Rolling: Independent  Supine to Sit: Independent  Sit to Supine: Independent  Scooting: Independent  Transfers:  Sit to Stand: Stand-by asssistance              Toilet Transfer : Stand-by asssistance (simulated with no AD)     ADL Assessment:(clincial judgement based on functional mobility)  Feeding: Independent     Oral Facial Hygiene/Grooming: Supervision     Bathing: Supervision     Upper Body Dressing: Independent     Lower Body Dressing: Supervision     Toileting: Supervision      Pain:  Pt reports pain in abdomen, but does not rate with a number      Activity Tolerance:   Fair     Please refer to the flowsheet for vital signs taken during this treatment. After treatment:   [ ]  Patient left in no apparent distress sitting up in chair  [X]  Patient left in no apparent distress in bed  [X]  Call bell left within reach  [ ]  Nursing notified  [ ]  Caregiver present  [ ]  Bed alarm activated      COMMUNICATION/EDUCATION:   Communication/Collaboration:  [X]      Home safety education was provided and the patient/caregiver indicated understanding.   [X]      Patient/family have participated as able and agree with findings and recommendations. [ ]      Patient is unable to participate in plan of care at this time.      Ginna Garvey OTR/L  Time Calculation: 9 mins

## 2017-03-28 NOTE — PROGRESS NOTES
Progress Note    Patient: Luciana Mckeon MRN: [id number]  CSN: [id number]    YOB: 1990  Age: 23 y.o. Sex: female    DOA: 3/23/2017 LOS:  LOS: 2 days                    Subjective:     Pt sleeping this morning when she wakes up c/o pain Lt upper quadrant and epigastric area CT scan was done yesterday result is pending . Pt was switched to oral Norco without relief  she  Was given Morphin 2 mg iv early this morning lab within normal WBC normal , liver enzymes normal . Had 3 loose BM yesterday    Hospital course  Luciana Mckeon is a 23 y.o.  female who has h/o poor compliant last seen at my office 3 years ago . Pt presented to ER c/o nausea and vomiting x 20 since waking up this morning. Pt also c/o RLQ,  abdominal pain that radiates to the right flank. Pt also has Lt upper quadrant pain      Pt also c/o vaginal spotting but states that it is because she is due for Depovera shot. She has been f/u women care at [address] [place] [address]  She denies any dysuria, frequency, vaginal discharge, diarrhea, or fever associated with her symptoms. Pt has surgival hx of Morina removal from abdomen and  section.      She states that she is allergic to Doxycycline and Latex. Pt admits to occasional tobacco use and alcohol use. she reported she had one hard Drink 2 days ago     She denies any illicit drug use. The patient reports no other symptoms and has no other concerns at this time.      CT scan abdomen and pelvis  Moderate amount of retained stool in proximal and mid colon.      Normal appendix identified.      No definable acute processes in abdomen or in pelvis.          Vaginal U/S  1. Normal uterus and endometrial stripe   2. Normal ovaries. 3. Small amount of free fluid in the cul-de-sac. CXR  Negative study.      EKG pending  U/S gallbladder    No diagnostic abnormality as described. Urine drug screen positive THC and opiates. Blood culture negative   GC/CT urine culture is negative  . CXR negative,   Chief Complaint:   Chief Complaint   Patient presents with    Abdominal Pain    Nausea    Vomiting       Review of systems  GENERAL: Patient  Sleepy responds to verbal and tactile stimulation alert and oriented x3  , able to communicate full sentences and not in distress. HEENT: No change in vision, no earache, tinnitus, sore throat or sinus congestion. NECK: No pain or stiffness. CARDIOVASCULAR: No chest pain or pressure. No palpitations. PULMONARY: No shortness of breath, cough or wheeze. GASTROINTESTINAL: positive  abdominal pain, positive  nausea,no vomiting no  diarrhea, melena or bright red blood per rectum. positive constipation  GENITOURINARY: No urinary frequency, urgency, hesitancy or dysuria. MUSCULOSKELETAL: No joint or muscle pain, positive  back pain, no recent trauma. DERMATOLOGIC: No rash, no itching, no lesions. ENDOCRINE: No polyuria, polydipsia, no heat or cold intolerance. No recent change in weight. HEMATOLOGICAL: No anemia or easy bruising or bleeding. NEUROLOGIC: No headache, seizures, numbness, tingling or weakness. PSYCHIATRIC: No depression, anxiety, H/O Bipolar, no loss of interest in normal activity or change in sleep pattern    Objective:     Physical Exam:  Visit Vitals    /70 (BP 1 Location: Right arm, BP Patient Position: At rest)    Pulse 67    Temp 99.1 °F (37.3 °C)    Resp 18    Ht 4' 8\" (1.422 m)    Wt 52.2 kg (115 lb)    SpO2 99%    Breastfeeding No    BMI 25.78 kg/m2      General:  Alert, cooperative, no distress, appears stated age. Head:  Normocephalic, without obvious abnormality, atraumatic. Eyes:  Conjunctivae/corneas clear. PERRL, EOMs intact. Nose: Nares normal. No drainage or sinus tenderness. Throat: Lips, mucosa, and tongue normal   Neck: Supple, symmetrical, trachea midline, no adenopathy, thyroid: no enlargement/tenderness/nodules, no carotid bruit and no JVD. Back:  ROM normal. No CVA tenderness.    Lungs: Clear to auscultation bilaterally. Chest wall:  No tenderness or deformity. Heart:  Regular rate and rhythm, S1, S2 normal, no murmur, click, rub or gallop. Abdomen: Soft, tender Lt upper quadrant epigastric tenderness . Bowel sounds normal. No masses, No organomegaly. Extremities: Extremities normal, atraumatic, no cyanosis or edema. Pulses: 2+ and symmetric all extremities. Skin: Skin color, texture, turgor normal. No rashes or lesions   Neurologic: CNII-XII intact. No focal motor or sensory deficit. Intake and Output:  Current Shift:     Last three shifts:  03/26 1901 - 03/28 0700  In: 3487.9 [P.O.:450; I.V.:3037.9]  Out: -     Labs: Results:       Chemistry Recent Labs      03/28/17 0330 03/27/17 0331 03/26/17 0310   GLU  95  81  117*   NA  140  139  141   K  3.5  3.7  3.7   CL  106  103  106   CO2  25  32  24   BUN  11  7  6*   CREA  1.20  0.82  0.86   CA  8.7  8.9  8.4*   AGAP  9  4  11   BUCR  9*  9*  7*   AP   --   105  103   TP   --   7.2  7.0   ALB   --   3.8  3.7   GLOB   --   3.4  3.3   AGRAT   --   1.1  1.1      CBC w/Diff Recent Labs      03/28/17 0330 03/27/17 0331 03/26/17 0310   WBC  7.5  7.2  5.9   RBC  4.09*  4.21  4.06*   HGB  13.3  13.7  13.1   HCT  38.5  39.6  38.2   PLT  207  195  204   GRANS  46  46  42   LYMPH  41  42  46   EOS  1  1  1      Cardiac Enzymes No results for input(s): CPK, CKND1, GILLES in the last 72 hours. No lab exists for component: CKRMB, TROIP   Coagulation No results for input(s): PTP, INR, APTT in the last 72 hours. No lab exists for component: INREXT, INREXT    Lipid Panel Lab Results   Component Value Date/Time    Triglyceride 106 03/24/2017 03:55 PM      BNP No results for input(s): BNPP in the last 72 hours.    Liver Enzymes Recent Labs      03/27/17   0331   TP  7.2   ALB  3.8   AP  105   SGOT  19      Thyroid Studies Lab Results   Component Value Date/Time    TSH 1.58 03/25/2017 03:10 AM          Procedures/imaging: see electronic medical records for all procedures/Xrays and details which were not copied into this note but were reviewed prior to creation of Plan    Medications:   Current Facility-Administered Medications   Medication Dose Route Frequency    HYDROcodone-acetaminophen (NORCO) 5-325 mg per tablet 1 Tab  1 Tab Oral Q4H PRN    0.9% sodium chloride infusion  75 mL/hr IntraVENous CONTINUOUS    docusate sodium (COLACE) capsule 100 mg  100 mg Oral BID    naloxegol (MOVANTIK) tablet 25 mg  25 mg Oral ACB    bisacodyl (DULCOLAX) suppository 10 mg  10 mg Rectal DAILY PRN    enoxaparin (LOVENOX) injection 40 mg  40 mg SubCUTAneous Q24H    ondansetron (ZOFRAN) injection 4 mg  4 mg IntraVENous Q6H PRN    pantoprazole (PROTONIX) injection 40 mg  40 mg IntraVENous Q12H    polyethylene glycol (MIRALAX) packet 17 g  17 g Oral TID       Assessment/Plan     Active Problems:    Abdominal pain (3/28/2014)      Pancreatitis, acute (3/24/2017)      Abdominal pain, acute, generalized (3/24/2017)    Plan    Abdominal pain/ pancreatitis  Lipase back to normal  Continue IV hydration   ultrasound gallbladder negative   F/u GC/CT culture negative  GGT 72    F/U IGG result  is normal   , NATALIA result is negative  Repeat CT scan is negative  Can not explain pt persistent pain will check ct scan and discuss with GI consult     Constipation  Movantik  25 mg daily  Colace 100 mg BID  miralax 17 gm daily  Given mag citrate per GI        Bipolar D/O  F/u psychiatry as outpatinet    F/u repeat ct scan will discuss with GI  D/C morphine   Continue Norco prn  Continue zofran prn  OOB to chair with assistant  Increase activity with assistant  Pt and ot evaluation and treatment     DVT and GI prophylaxis Lovenox and protonix    Maury Mejias MD  3/28/2017 7:17  AM

## 2017-03-28 NOTE — PROGRESS NOTES
[email or web address]  [phone]    Progress Note    Patient: Abhinav Montero MRN: [id number]  SSN: [id number]    YOB: 1990  Age: 23 y.o. Sex: female      Admit Date: 3/23/2017    LOS: 2 days     Chief complaint:abd pain     Impression:     1. Acute pancreatitis, lipase now normal-- suspect alcohol related, did admit to heavy ETOH use chronically on adm but denies now. Neg US for biliary source and TGY normal . NATALIA, IgG 4 pending. CT did not show any clear evidence of pancreatitis. Repeat CT also unremarkable  2. Abdominal pain possibly due to constipation-doubt this is related to worsening pancreatic inflammation. Moved her bowels 2-3 times with Mag citrate. States that her pain is worse today- 2 CT scans have not yielded any cause for severe acute pain. ? Drug seeking behavior? 3. Recurrent nausea and vomiting -- pancreatitis vs PUD vs biliary source vs marijuana hyperemesis syndrome-acknowledges need for \"hot showers\"  4. Abnormal GI imaging - transient small bowel dilation on prior CT scans very non specific may want to consider a SBFT as out pt  5. Chronic constipation   6. Drug screen + for opiates and MJ. Interestingly the pt now tells me she is on not on the listed opiod out pt meds. High risk for opioid abuse in my opinion and this is worrysome . 7. Hx of bipolar disorder    Plan:     1. Continue movantik and miralax   2. EGD in am-yield is likely to be low, but will make sure we are not dealing with an ulcer, ets. 3. Minimize narcotic use   4. OP SBFT      Subjective:     Moved bowels but still has pain-severe today, not throwing up as much now.      Objective:     Vitals:    03/28/17 0400 03/28/17 0750 03/28/17 1146 03/28/17 1623   BP: 118/70 118/62 155/78 117/73   Pulse: 67 63 72 68   Resp: 18 17 18 17   Temp: 99.1 °F (37.3 °C) 98.7 °F (37.1 °C) 99.4 °F (37.4 °C) 99 °F (37.2 °C)   SpO2: 99% 100% 100% 100%   Weight:       Height:            Physical Exam:   GENERAL: alert, cooperative, no distress, appears stated age  ABDOMEN: scaphoid     Lab/Data Review:  BMP:   Lab Results   Component Value Date/Time     03/28/2017 03:30 AM    K 3.5 03/28/2017 03:30 AM     03/28/2017 03:30 AM    CO2 25 03/28/2017 03:30 AM    AGAP 9 03/28/2017 03:30 AM    GLU 95 03/28/2017 03:30 AM    BUN 11 03/28/2017 03:30 AM    CREA 1.20 03/28/2017 03:30 AM    GFRAA >60 03/28/2017 03:30 AM    GFRNA 55 (L) 03/28/2017 03:30 AM     CMP:   Lab Results   Component Value Date/Time     03/28/2017 03:30 AM    K 3.5 03/28/2017 03:30 AM     03/28/2017 03:30 AM    CO2 25 03/28/2017 03:30 AM    AGAP 9 03/28/2017 03:30 AM    GLU 95 03/28/2017 03:30 AM    BUN 11 03/28/2017 03:30 AM    CREA 1.20 03/28/2017 03:30 AM    GFRAA >60 03/28/2017 03:30 AM    GFRNA 55 (L) 03/28/2017 03:30 AM    CA 8.7 03/28/2017 03:30 AM    MG 2.4 03/28/2017 03:30 AM     CBC:   Lab Results   Component Value Date/Time    WBC 7.5 03/28/2017 03:30 AM    HGB 13.3 03/28/2017 03:30 AM    HCT 38.5 03/28/2017 03:30 AM     03/28/2017 03:30 AM     COAGS: No results found for: APTT, PTP, INR  Liver Panel:   No results found for: ALB, CBIL, TBIL, TP, GLOB, AGRAT, SGOT, ASTPOC, ALTPOC, ALT, GPT, AP       Active Problems:    Abdominal pain (3/28/2014)      Pancreatitis, acute (3/24/2017)      Abdominal pain, acute, generalized (3/24/2017)          Signed By: Kelli Trujillo MD     March 28, 2017

## 2017-03-28 NOTE — ROUTINE PROCESS
Patient complaining of pain still even with pain medication. Doctor called. Doctor prescribed Benytyl.

## 2017-03-28 NOTE — PROGRESS NOTES
Pt states she and her family are living in a hotel until 4/1/17. She states they will be moving in their home on 4/1/17.

## 2017-03-29 ENCOUNTER — ANESTHESIA (OUTPATIENT)
Dept: ENDOSCOPY | Age: 27
DRG: 282 | End: 2017-03-29
Payer: MEDICAID

## 2017-03-29 LAB
ALBUMIN SERPL BCP-MCNC: 3.5 G/DL (ref 3.4–5)
ALBUMIN/GLOB SERPL: 1.1 {RATIO} (ref 0.8–1.7)
ALP SERPL-CCNC: 91 U/L (ref 45–117)
ALT SERPL-CCNC: 34 U/L (ref 13–56)
ANION GAP BLD CALC-SCNC: 8 MMOL/L (ref 3–18)
AST SERPL W P-5'-P-CCNC: 20 U/L (ref 15–37)
BASOPHILS # BLD AUTO: 0 K/UL (ref 0–0.1)
BASOPHILS # BLD: 0 % (ref 0–2)
BILIRUB SERPL-MCNC: 0.3 MG/DL (ref 0.2–1)
BUN SERPL-MCNC: 13 MG/DL (ref 7–18)
BUN/CREAT SERPL: 16 (ref 12–20)
CALCIUM SERPL-MCNC: 8.2 MG/DL (ref 8.5–10.1)
CHLORIDE SERPL-SCNC: 107 MMOL/L (ref 100–108)
CO2 SERPL-SCNC: 25 MMOL/L (ref 21–32)
CREAT SERPL-MCNC: 0.81 MG/DL (ref 0.6–1.3)
DIFFERENTIAL METHOD BLD: ABNORMAL
EOSINOPHIL # BLD: 0.1 K/UL (ref 0–0.4)
EOSINOPHIL NFR BLD: 1 % (ref 0–5)
ERYTHROCYTE [DISTWIDTH] IN BLOOD BY AUTOMATED COUNT: 12.2 % (ref 11.6–14.5)
GLOBULIN SER CALC-MCNC: 3.2 G/DL (ref 2–4)
GLUCOSE SERPL-MCNC: 83 MG/DL (ref 74–99)
HCG UR QL: NEGATIVE
HCT VFR BLD AUTO: 36.8 % (ref 35–45)
HGB BLD-MCNC: 12.3 G/DL (ref 12–16)
LYMPHOCYTES # BLD AUTO: 36 % (ref 21–52)
LYMPHOCYTES # BLD: 2.8 K/UL (ref 0.9–3.6)
MAGNESIUM SERPL-MCNC: 2.1 MG/DL (ref 1.8–2.4)
MCH RBC QN AUTO: 31.9 PG (ref 24–34)
MCHC RBC AUTO-ENTMCNC: 33.4 G/DL (ref 31–37)
MCV RBC AUTO: 95.3 FL (ref 74–97)
MONOCYTES # BLD: 0.8 K/UL (ref 0.05–1.2)
MONOCYTES NFR BLD AUTO: 10 % (ref 3–10)
NEUTS SEG # BLD: 4 K/UL (ref 1.8–8)
NEUTS SEG NFR BLD AUTO: 53 % (ref 40–73)
PLATELET # BLD AUTO: 195 K/UL (ref 135–420)
PMV BLD AUTO: 10.9 FL (ref 9.2–11.8)
POTASSIUM SERPL-SCNC: 3.7 MMOL/L (ref 3.5–5.5)
PROT SERPL-MCNC: 6.7 G/DL (ref 6.4–8.2)
RBC # BLD AUTO: 3.86 M/UL (ref 4.2–5.3)
SODIUM SERPL-SCNC: 140 MMOL/L (ref 136–145)
WBC # BLD AUTO: 7.6 K/UL (ref 4.6–13.2)

## 2017-03-29 PROCEDURE — 85025 COMPLETE CBC W/AUTO DIFF WBC: CPT | Performed by: FAMILY MEDICINE

## 2017-03-29 PROCEDURE — 77030008565 HC TBNG SUC IRR ERBE -B: Performed by: INTERNAL MEDICINE

## 2017-03-29 PROCEDURE — 93970 EXTREMITY STUDY: CPT

## 2017-03-29 PROCEDURE — C9113 INJ PANTOPRAZOLE SODIUM, VIA: HCPCS | Performed by: INTERNAL MEDICINE

## 2017-03-29 PROCEDURE — 65270000029 HC RM PRIVATE

## 2017-03-29 PROCEDURE — 74011250637 HC RX REV CODE- 250/637: Performed by: FAMILY MEDICINE

## 2017-03-29 PROCEDURE — 81025 URINE PREGNANCY TEST: CPT

## 2017-03-29 PROCEDURE — 88342 IMHCHEM/IMCYTCHM 1ST ANTB: CPT | Performed by: INTERNAL MEDICINE

## 2017-03-29 PROCEDURE — 74011250636 HC RX REV CODE- 250/636: Performed by: FAMILY MEDICINE

## 2017-03-29 PROCEDURE — 36415 COLL VENOUS BLD VENIPUNCTURE: CPT | Performed by: FAMILY MEDICINE

## 2017-03-29 PROCEDURE — 74011000250 HC RX REV CODE- 250: Performed by: ANESTHESIOLOGY

## 2017-03-29 PROCEDURE — 88305 TISSUE EXAM BY PATHOLOGIST: CPT | Performed by: INTERNAL MEDICINE

## 2017-03-29 PROCEDURE — 0DB68ZX EXCISION OF STOMACH, VIA NATURAL OR ARTIFICIAL OPENING ENDOSCOPIC, DIAGNOSTIC: ICD-10-PCS | Performed by: INTERNAL MEDICINE

## 2017-03-29 PROCEDURE — 74011250636 HC RX REV CODE- 250/636: Performed by: INTERNAL MEDICINE

## 2017-03-29 PROCEDURE — 80053 COMPREHEN METABOLIC PANEL: CPT | Performed by: FAMILY MEDICINE

## 2017-03-29 PROCEDURE — 76040000019: Performed by: INTERNAL MEDICINE

## 2017-03-29 PROCEDURE — 77030019988 HC FCPS ENDOSC DISP BSC -B: Performed by: INTERNAL MEDICINE

## 2017-03-29 PROCEDURE — 74011250637 HC RX REV CODE- 250/637: Performed by: INTERNAL MEDICINE

## 2017-03-29 PROCEDURE — 77030018846 HC SOL IRR STRL H20 ICUM -A: Performed by: INTERNAL MEDICINE

## 2017-03-29 PROCEDURE — 76060000031 HC ANESTHESIA FIRST 0.5 HR: Performed by: INTERNAL MEDICINE

## 2017-03-29 PROCEDURE — 74011250636 HC RX REV CODE- 250/636

## 2017-03-29 PROCEDURE — 83735 ASSAY OF MAGNESIUM: CPT | Performed by: FAMILY MEDICINE

## 2017-03-29 PROCEDURE — 74011250636 HC RX REV CODE- 250/636: Performed by: ANESTHESIOLOGY

## 2017-03-29 RX ORDER — SODIUM CHLORIDE 0.9 % (FLUSH) 0.9 %
5-10 SYRINGE (ML) INJECTION AS NEEDED
Status: DISCONTINUED | OUTPATIENT
Start: 2017-03-29 | End: 2017-03-29 | Stop reason: HOSPADM

## 2017-03-29 RX ORDER — INSULIN LISPRO 100 [IU]/ML
INJECTION, SOLUTION INTRAVENOUS; SUBCUTANEOUS ONCE
Status: DISCONTINUED | OUTPATIENT
Start: 2017-03-29 | End: 2017-03-29 | Stop reason: HOSPADM

## 2017-03-29 RX ORDER — SODIUM CHLORIDE, SODIUM LACTATE, POTASSIUM CHLORIDE, CALCIUM CHLORIDE 600; 310; 30; 20 MG/100ML; MG/100ML; MG/100ML; MG/100ML
75 INJECTION, SOLUTION INTRAVENOUS CONTINUOUS
Status: DISCONTINUED | OUTPATIENT
Start: 2017-03-29 | End: 2017-03-29 | Stop reason: HOSPADM

## 2017-03-29 RX ORDER — PROPOFOL 10 MG/ML
INJECTION, EMULSION INTRAVENOUS AS NEEDED
Status: DISCONTINUED | OUTPATIENT
Start: 2017-03-29 | End: 2017-03-29 | Stop reason: HOSPADM

## 2017-03-29 RX ORDER — ONDANSETRON 2 MG/ML
4 INJECTION INTRAMUSCULAR; INTRAVENOUS ONCE
Status: DISCONTINUED | OUTPATIENT
Start: 2017-03-29 | End: 2017-03-29 | Stop reason: HOSPADM

## 2017-03-29 RX ORDER — SODIUM CHLORIDE 0.9 % (FLUSH) 0.9 %
5-10 SYRINGE (ML) INJECTION EVERY 8 HOURS
Status: DISCONTINUED | OUTPATIENT
Start: 2017-03-29 | End: 2017-03-29 | Stop reason: HOSPADM

## 2017-03-29 RX ORDER — FAMOTIDINE 10 MG/ML
20 INJECTION INTRAVENOUS ONCE
Status: COMPLETED | OUTPATIENT
Start: 2017-03-29 | End: 2017-03-29

## 2017-03-29 RX ADMIN — POLYETHYLENE GLYCOL 3350 17 G: 17 POWDER, FOR SOLUTION ORAL at 09:00

## 2017-03-29 RX ADMIN — PROPOFOL 50 MG: 10 INJECTION, EMULSION INTRAVENOUS at 10:04

## 2017-03-29 RX ADMIN — DICYCLOMINE HYDROCHLORIDE 10 MG: 10 CAPSULE ORAL at 21:58

## 2017-03-29 RX ADMIN — FAMOTIDINE 20 MG: 10 INJECTION, SOLUTION INTRAVENOUS at 09:36

## 2017-03-29 RX ADMIN — HYDROCODONE BITARTRATE AND ACETAMINOPHEN 1 TABLET: 5; 325 TABLET ORAL at 20:45

## 2017-03-29 RX ADMIN — DOCUSATE SODIUM 100 MG: 100 CAPSULE, LIQUID FILLED ORAL at 18:08

## 2017-03-29 RX ADMIN — POLYETHYLENE GLYCOL 3350 17 G: 17 POWDER, FOR SOLUTION ORAL at 21:58

## 2017-03-29 RX ADMIN — DICYCLOMINE HYDROCHLORIDE 10 MG: 10 CAPSULE ORAL at 13:00

## 2017-03-29 RX ADMIN — PANTOPRAZOLE SODIUM 40 MG: 40 INJECTION, POWDER, FOR SOLUTION INTRAVENOUS at 09:00

## 2017-03-29 RX ADMIN — PROPOFOL 100 MG: 10 INJECTION, EMULSION INTRAVENOUS at 09:58

## 2017-03-29 RX ADMIN — SODIUM CHLORIDE 75 ML/HR: 900 INJECTION, SOLUTION INTRAVENOUS at 11:09

## 2017-03-29 RX ADMIN — DICYCLOMINE HYDROCHLORIDE 10 MG: 10 CAPSULE ORAL at 18:08

## 2017-03-29 RX ADMIN — DOCUSATE SODIUM 100 MG: 100 CAPSULE, LIQUID FILLED ORAL at 09:00

## 2017-03-29 RX ADMIN — SODIUM CHLORIDE, SODIUM LACTATE, POTASSIUM CHLORIDE, AND CALCIUM CHLORIDE 75 ML/HR: 600; 310; 30; 20 INJECTION, SOLUTION INTRAVENOUS at 09:36

## 2017-03-29 RX ADMIN — Medication 10 ML: at 09:24

## 2017-03-29 RX ADMIN — NALOXEGOL OXALATE 25 MG: 25 TABLET, FILM COATED ORAL at 07:30

## 2017-03-29 RX ADMIN — ENOXAPARIN SODIUM 40 MG: 40 INJECTION SUBCUTANEOUS at 09:00

## 2017-03-29 RX ADMIN — PROPOFOL 100 MG: 10 INJECTION, EMULSION INTRAVENOUS at 10:01

## 2017-03-29 RX ADMIN — HYDROCODONE BITARTRATE AND ACETAMINOPHEN 1 TABLET: 5; 325 TABLET ORAL at 11:08

## 2017-03-29 RX ADMIN — Medication 10 ML: at 09:36

## 2017-03-29 RX ADMIN — ONDANSETRON HYDROCHLORIDE 4 MG: 2 SOLUTION INTRAMUSCULAR; INTRAVENOUS at 19:58

## 2017-03-29 RX ADMIN — HYDROCODONE BITARTRATE AND ACETAMINOPHEN 1 TABLET: 5; 325 TABLET ORAL at 16:17

## 2017-03-29 RX ADMIN — PANTOPRAZOLE SODIUM 40 MG: 40 INJECTION, POWDER, FOR SOLUTION INTRAVENOUS at 20:45

## 2017-03-29 RX ADMIN — DICYCLOMINE HYDROCHLORIDE 10 MG: 10 CAPSULE ORAL at 09:00

## 2017-03-29 NOTE — PROCEDURES
[email or web address]  [facility], [address]     Endoscopic Gastroduodenoscopy Procedure Note    Chen Sellers  [birth year]  [id number]    Indication:  Abdominal pain, epigastric     : Rolan Spencer MD    Referring Provider:  None    Anesthesia/Sedation:  MAC anesthesia      Procedure Details     After infomed consent was obtained for the procedure, with all risks and benefits of procedure explained the patient was taken to the endoscopy suite and placed in the left lateral decubitus position. Following sequential administration of sedation as per above, the endoscope was inserted into the mouth and advanced under direct vision to second portion of the duodenum. A careful inspection was made as the gastroscope was withdrawn, including a retroflexed view of the proximal stomach; findings and interventions are described below. Findings:   Esophagus: Normal  Stomach: Mild patchy gastritis, bx taken from body and antrum  Duodenum/jejunum: Mild duodenitis    Therapies:  biopsy of stomach body, antrum    Specimens: * No specimens in log *           Complications:   None; patient tolerated the procedure well. EBL:  None. Impression:    * No post-op diagnosis entered *      Recommendations:  1. Follow biopsy-in my opinion, the findings on EGD do not explain her pain  2. Resume diet  3. Urine porphobilinogen  4. Minimize narcotics  5. No additional work up planned as inpatient. 6. Follow up with us in 4 weeks.       Rolan Spencer MD  3/29/2017  9:55 AM

## 2017-03-29 NOTE — PROCEDURES
[facility]  *** FINAL REPORT ***    Name: Barbara Melton  MRN: [id number]    Inpatient  : 03 May 1990  HIS Order #: [id number]  [address] [facility] Visit #: [id number]  Date: 29 Mar 2017    TYPE OF TEST: Peripheral Venous Testing    REASON FOR TEST  Pain in limb    Right Leg:-  Deep venous thrombosis:           No  Superficial venous thrombosis:    No  Deep venous insufficiency:        Not examined  Superficial venous insufficiency: Not examined    Left Leg:-  Deep venous thrombosis:           No  Superficial venous thrombosis:    No  Deep venous insufficiency:        Not examined  Superficial venous insufficiency: Not examined      INTERPRETATION/FINDINGS  Duplex images were obtained using 2-D gray scale, color flow, and  spectral Doppler analysis. Right leg :  1. Deep veins visualized include the common femoral, femoral,  popliteal, posterior tibial and peroneal veins. 2. No evidence of deep venous thrombosis detected in the veins  visualized. 3. Superficial veins visualized include the great saphenous vein. 4. No evidence of superficial thrombosis detected. 5. Normal multiphasic flow in the posterior tibial artery. Left leg :  1. Deep veins visualized include the common femoral, femoral,  popliteal, posterior tibial and peroneal veins. 2. No evidence of deep venous thrombosis detected in the veins  visualized. 3. Superficial veins visualized include the great saphenous vein. 4. No evidence of superficial thrombosis detected. 5. Normal multiphasic flow in the posterior tibial artery. ADDITIONAL COMMENTS    I have personally reviewed the data relevant to the interpretation of  this  study.     TECHNOLOGIST: Lenore Smith, [address] [place] [address]  Signed: 3/29/2017 3:42:02 PM    PHYSICIAN: Sabino Ibarra MD  Signed: 3/30/2017 11:37:26 AM

## 2017-03-29 NOTE — ROUTINE PROCESS
Bedside and Verbal shift change report given to [address] Jones Se (oncoming nurse) by Ivonne Escobedo RN (offgoing nurse). Report included the following information SBAR, Kardex, MAR and Recent Results.     SITUATION:    Code Status: Full Code  Reason for Admission: pancreatitis, intractable vomiting  Abdominal pain   Abd pain    [facility] day: 3   Problem List:       Hospital Problems  Never Reviewed          Codes Class Noted POA    Pancreatitis, acute ICD-10-CM: K85.90  ICD-9-CM: 509.5  3/24/2017 Yes        Abdominal pain, acute, generalized ICD-10-CM: R10.84  ICD-9-CM: 789.07, 338.19  3/24/2017 Yes        Abdominal pain ICD-10-CM: R10.9  ICD-9-CM: 789.00  3/28/2014 Unknown              BACKGROUND:    Past Medical History:   Past Medical History:   Diagnosis Date    Bipolar 1 disorder, depressed ([facility] [address]     Neurological disorder     Psychiatric disorder          Patient taking anticoagulants yes     ASSESSMENT:    Changes in Assessment Throughout Shift: none     Patient has Central Line: no Reasons if yes:    Patient has Ramos Cath: no Reasons if yes:       Last Vitals:     Vitals:    03/28/17 1146 03/28/17 1623 03/28/17 2000 03/29/17 0400   BP: 155/78 117/73 111/75 100/69   Pulse: 72 68 (!) 58 60   Resp: 18 17 19 17   Temp: 99.4 °F (37.4 °C) 99 °F (37.2 °C) 97.8 °F (36.6 °C) 98 °F (36.7 °C)   SpO2: 100% 100% 99% 100%   Weight:       Height:            IV and DRAINS (will only show if present)   [REMOVED] Peripheral IV 03/25/17 Right Forearm-Site Assessment: Clean, dry, & intact  Peripheral IV 03/27/17 Left Wrist-Site Assessment: Clean, dry, & intact  [REMOVED] Peripheral IV 03/23/17 Right Antecubital-Site Assessment: Clean, dry, & intact     WOUND (if present)   Wound Type:  none   Dressing present Dressing Present : No   Wound Concerns/Notes:  none     PAIN    Pain Assessment    Pain Intensity 1: 0 (03/28/17 2309)    Pain Location 1: Abdomen    Pain Intervention(s) 1: Medication (see MAR)    Patient Stated Pain Goal: 0  o Interventions for Pain:Yes  o Intervention effective: yes  o Time of last intervention: See MAR   o Reassessment Completed: yes      Last 3 Weights:  Last 3 Recorded Weights in this Encounter    03/23/17 1720   Weight: 52.2 kg (115 lb)     Weight change:      INTAKE/OUPUT    Current Shift:      Last three shifts: 03/27 0701 - 03/28 1900  In: 2204.2 [P.O.:710; I.V.:1494.2]  Out: 750 [Urine:750]     LAB RESULTS     Recent Labs      03/29/17   0305  03/28/17   0330  03/27/17   0331   WBC  7.6  7.5  7.2   HGB  12.3  13.3  13.7   HCT  36.8  38.5  39.6   PLT  195  207  195        Recent Labs      03/28/17   0330  03/27/17   0331   NA  140  139   K  3.5  3.7   GLU  95  81   BUN  11  7   CREA  1.20  0.82   CA  8.7  8.9   MG  2.4  2.5*       RECOMMENDATIONS AND DISCHARGE PLANNING     1. Pending tests/procedures/ Plan of Care or Other Needs: yes     2. Discharge plan for patient and Needs/Barriers: TBD    3. Estimated Discharge Date: TBD Posted on Whiteboard in Patients Room: no      4. The patient's care plan was reviewed with the oncoming nurse. \"HEALS\" SAFETY CHECK      Fall Risk    Total Score: 1    Safety Measures: Safety Measures: Call light within reach, Side rails X 3    A safety check occurred in the patient's room between off going nurse and oncoming nurse listed above.     The safety check included the below items  Area Items   H  High Alert Medications - Verify all high alert medication drips (heparin, PCA, etc.)   E  Equipment - Suction is set up for ALL patients (with yanker)  - Red plugs utilized for all equipment (IV pumps, etc.)  - WOWs wiped down at end of shift.  - Room stocked with oxygen, suction, and other unit-specific supplies   A  Alarms - Bed alarm is set for fall risk patients  - Ensure chair alarm is in place and activated if patient is up in a chair   L  Lines - Check IV for any infiltration  - Ramos bag is empty if patient has a Ramos   - Tubing and IV bags are labeled   S  Safety   - Room is clean, patient is clean, and equipment is clean. - Hallways are clear from equipment besides carts. - Fall bracelet on for fall risk patients  - Ensure room is clear and free of clutter  - Suction is set up for ALL patients (with winsome)  - Hallways are clear from equipment besides carts.    - Isolation precautions followed, supplies available outside room, sign posted     Julien Haskins RN

## 2017-03-29 NOTE — PROGRESS NOTES
Progress Note    Patient: Rigoberto Luis MRN: [id number]  CSN: [id number]    YOB: 1990  Age: 23 y.o. Sex: female    DOA: 3/23/2017 LOS:  LOS: 3 days                    Subjective:     Pt still c/o Lt upper quadrant pain Rt lower quadrant pain repeat CT scan  No acute finding. Discussed with Dr Roya Jarquin she is going for upper endoscopy. Pt reported not on any depressive medications  before admission. Pt c/o Rt upper thigh pain    Hospital course  Rigoberto Luis is a 23 y.o.  female who has h/o poor compliant last seen at my office 3 years ago . Pt presented to ER c/o nausea and vomiting x 20 since waking up this morning. Pt also c/o RLQ,  abdominal pain that radiates to the right flank. Pt also has Lt upper quadrant pain      Pt also c/o vaginal spotting but states that it is because she is due for Depovera shot. She has been f/u women care at [address] [place] [address]  She denies any dysuria, frequency, vaginal discharge, diarrhea, or fever associated with her symptoms. Pt has surgival hx of Shanna removal from abdomen and  section.      She stated that she is allergic to Doxycycline and Latex. Pt admits to occasional tobacco use and alcohol use. she reported she had one hard Drink 2 days ago     She denies any illicit drug use. The patient reports no other symptoms and has no other concerns at this time.      CT scan abdomen and pelvis  Moderate amount of retained stool in proximal and mid colon.      Normal appendix identified.      No definable acute processes in abdomen or in pelvis.          Vaginal U/S  1. Normal uterus and endometrial stripe   2. Normal ovaries. 3. Small amount of free fluid in the cul-de-sac. CXR  Negative study.      EKG not done no c/o no chest pain no SOB  U/S gallbladder    No diagnostic abnormality as described. Urine drug screen positive THC and opiates. Blood culture negative   GC/CT urine culture is negative  . CXR negative.     Chief Complaint:   Chief Complaint Patient presents with    Abdominal Pain    Nausea    Vomiting       Review of systems  GENERAL: Patient  Sleepy responds to verbal and tactile stimulation alert and oriented x3  , able to communicate full sentences and not in distress. HEENT: No change in vision, no earache, tinnitus, sore throat or sinus congestion. NECK: No pain or stiffness. CARDIOVASCULAR: No chest pain or pressure. No palpitations. PULMONARY: No shortness of breath, cough or wheeze. GASTROINTESTINAL: positive  abdominal pain, positive  nausea,no vomiting no  diarrhea, melena or bright red blood per rectum. positive constipation  GENITOURINARY: No urinary frequency, urgency, hesitancy or dysuria. MUSCULOSKELETAL: No joint or muscle pain, positive  back pain, no recent trauma. DERMATOLOGIC: No rash, no itching, no lesions. ENDOCRINE: No polyuria, polydipsia, no heat or cold intolerance. No recent change in weight. HEMATOLOGICAL: No anemia or easy bruising or bleeding. NEUROLOGIC: No headache, seizures, numbness, tingling or weakness. PSYCHIATRIC: No depression, anxiety, H/O Bipolar, no loss of interest in normal activity or change in sleep pattern    Objective:     Physical Exam:  Visit Vitals    /64 (BP 1 Location: Left arm, BP Patient Position: Supine)    Pulse 60    Temp 98 °F (36.7 °C)    Resp 16    Ht 4' 8\" (1.422 m)    Wt 52.2 kg (115 lb)    SpO2 100%    Breastfeeding No    BMI 25.78 kg/m2      General:  Alert, cooperative, no distress, appears stated age. Head:  Normocephalic, without obvious abnormality, atraumatic. Eyes:  Conjunctivae/corneas clear. PERRL, EOMs intact. Nose: Nares normal. No drainage or sinus tenderness. Throat: Lips, mucosa, and tongue normal   Neck: Supple, symmetrical, trachea midline, no adenopathy, thyroid: no enlargement/tenderness/nodules, no carotid bruit and no JVD. Back:  ROM normal. No CVA tenderness. Lungs:  Clear to auscultation bilaterally.    Chest wall: No tenderness or deformity. Heart:  Regular rate and rhythm, S1, S2 normal, no murmur, click, rub or gallop. Abdomen: Soft, tender Lt upper quadrant epigastric tenderness . Bowel sounds normal. No masses, No organomegaly. no regidity   Extremities: Extremities normal, atraumatic, no cyanosis or edema. Pulses: 2+ and symmetric all extremities. Skin: Skin color, texture, turgor normal. No rashes or lesions   Neurologic: CNII-XII intact. No focal motor or sensory deficit. Intake and Output:  Current Shift:     Last three shifts:  03/27 1901 - 03/29 0700  In: 1583.8 [P.O.:380; I.V.:1203.8]  Out: 750 [Urine:750]    Labs: Results:       Chemistry Recent Labs      03/29/17 0305 03/28/17 0330 03/27/17 0331   GLU  83  95  81   NA  140  140  139   K  3.7  3.5  3.7   CL  107  106  103   CO2  25  25  32   BUN  13  11  7   CREA  0.81  1.20  0.82   CA  8.2*  8.7  8.9   AGAP  8  9  4   BUCR  16  9*  9*   AP  91   --   105   TP  6.7   --   7.2   ALB  3.5   --   3.8   GLOB  3.2   --   3.4   AGRAT  1.1   --   1.1      CBC w/Diff Recent Labs      03/29/17 0305 03/28/17 0330 03/27/17 0331   WBC  7.6  7.5  7.2   RBC  3.86*  4.09*  4.21   HGB  12.3  13.3  13.7   HCT  36.8  38.5  39.6   PLT  195  207  195   GRANS  53  46  46   LYMPH  36  41  42   EOS  1  1  1      Cardiac Enzymes No results for input(s): CPK, CKND1, GILLES in the last 72 hours. No lab exists for component: CKRMB, TROIP   Coagulation No results for input(s): PTP, INR, APTT in the last 72 hours. No lab exists for component: INREXT, INREXT    Lipid Panel Lab Results   Component Value Date/Time    Triglyceride 106 03/24/2017 03:55 PM      BNP No results for input(s): BNPP in the last 72 hours.    Liver Enzymes Recent Labs      03/29/17 0305   TP  6.7   ALB  3.5   AP  91   SGOT  20      Thyroid Studies Lab Results   Component Value Date/Time    TSH 1.58 03/25/2017 03:10 AM          Procedures/imaging: see electronic medical records for all procedures/Xrays and details which were not copied into this note but were reviewed prior to creation of Plan    Medications:   Current Facility-Administered Medications   Medication Dose Route Frequency    dicyclomine (BENTYL) capsule 10 mg  10 mg Oral QID    HYDROcodone-acetaminophen (NORCO) 5-325 mg per tablet 1 Tab  1 Tab Oral Q4H PRN    0.9% sodium chloride infusion  75 mL/hr IntraVENous CONTINUOUS    docusate sodium (COLACE) capsule 100 mg  100 mg Oral BID    naloxegol (MOVANTIK) tablet 25 mg  25 mg Oral ACB    bisacodyl (DULCOLAX) suppository 10 mg  10 mg Rectal DAILY PRN    enoxaparin (LOVENOX) injection 40 mg  40 mg SubCUTAneous Q24H    ondansetron (ZOFRAN) injection 4 mg  4 mg IntraVENous Q6H PRN    pantoprazole (PROTONIX) injection 40 mg  40 mg IntraVENous Q12H    polyethylene glycol (MIRALAX) packet 17 g  17 g Oral TID       Assessment/Plan     Active Problems:    Abdominal pain (3/28/2014)      Pancreatitis, acute (3/24/2017)      Abdominal pain, acute, generalized (3/24/2017)    Plan    Abdominal pain/ pancreatitis  Lipase back to normal repeat lipase today 294  Continue IV hydration   ultrasound gallbladder negative   F/u GC/CT culture negative  GGT 72    F/U IGG result  is normal   , NATALIA result is negative  Repeat CT scan is negative  Can not explain pt persistent pain will f/u GI recommednations  Constipation  Movantik  25 mg daily  Colace 100 mg BID  miralax 17 gm daily  Given mag citrate per GI        Bipolar D/O  F/u psychiatry as outpatinet    F/u upper endoscopy  D/C morphine   Continue Norco prn  Continue zofran prn  OOB to chair with assistant  Increase activity with assistant  Pt and ot evaluation and treatment     Rt upper leg pain  Will get venous duplex ultrasound    DVT and GI prophylaxis Lovenox and protonix    Noe MD Elyse  3/29/2017 8:44  AM

## 2017-03-29 NOTE — ANESTHESIA PREPROCEDURE EVALUATION
Anesthetic History               Review of Systems / Medical History  Patient summary reviewed and pertinent labs reviewed    Pulmonary  Within defined limits                 Neuro/Psych         Psychiatric history     Cardiovascular                  Exercise tolerance: >4 METS     GI/Hepatic/Renal  Within defined limits              Endo/Other  Within defined limits           Other Findings   Comments:   Risk Factors for Postoperative nausea/vomiting:       History of postoperative nausea/vomiting? NO       Female? YES       Motion sickness? NO       Intended opioid administration for postoperative analgesia?   NO           Physical Exam    Airway  Mallampati: II  TM Distance: 4 - 6 cm  Neck ROM: normal range of motion   Mouth opening: Normal     Cardiovascular               Dental  No notable dental hx       Pulmonary  Breath sounds clear to auscultation               Abdominal  GI exam deferred       Other Findings            Anesthetic Plan    ASA: 2  Anesthesia type: MAC          Induction: Intravenous  Anesthetic plan and risks discussed with: Patient

## 2017-03-29 NOTE — PERIOP NOTES
TRANSFER - OUT REPORT:    Verbal report given to [facility] RN on [facility]  being transferred to  for routine post - op       Report consisted of patients Situation, Background, Assessment and   Recommendations(SBAR). Information from the following report(s) SBAR and MAR was reviewed with the receiving nurse. Lines:   Peripheral IV 03/27/17 Left Wrist (Active)   Site Assessment Clean, dry, & intact 3/29/2017 10:12 AM   Phlebitis Assessment 0 3/29/2017 10:12 AM   Infiltration Assessment 0 3/29/2017 10:12 AM   Dressing Status Clean, dry, & intact 3/29/2017 10:12 AM   Dressing Type Tape;Transparent 3/29/2017 10:12 AM   Hub Color/Line Status Yellow; Infusing 3/29/2017 10:12 AM        Opportunity for questions and clarification was provided.       Patient transported with:   [employer]

## 2017-03-29 NOTE — PROGRESS NOTES
0000- Pt NPO for procedure in AM.    6669- No nausea or vomiting. Abdominal pain minimally controlled with PO pain medication. Ambulatory to  with no assistance. Voiding yellow/straw urine.

## 2017-03-30 VITALS
HEART RATE: 73 BPM | BODY MASS INDEX: 25.87 KG/M2 | SYSTOLIC BLOOD PRESSURE: 116 MMHG | TEMPERATURE: 97.9 F | RESPIRATION RATE: 16 BRPM | OXYGEN SATURATION: 99 % | HEIGHT: 56 IN | DIASTOLIC BLOOD PRESSURE: 72 MMHG | WEIGHT: 115 LBS

## 2017-03-30 LAB
ALBUMIN SERPL BCP-MCNC: 3.6 G/DL (ref 3.4–5)
ALBUMIN/GLOB SERPL: 1.1 {RATIO} (ref 0.8–1.7)
ALP SERPL-CCNC: 101 U/L (ref 45–117)
ALT SERPL-CCNC: 46 U/L (ref 13–56)
ANION GAP BLD CALC-SCNC: 7 MMOL/L (ref 3–18)
AST SERPL W P-5'-P-CCNC: 41 U/L (ref 15–37)
BACTERIA SPEC CULT: NORMAL
BACTERIA SPEC CULT: NORMAL
BASOPHILS # BLD AUTO: 0 K/UL (ref 0–0.06)
BASOPHILS # BLD: 0 % (ref 0–2)
BILIRUB SERPL-MCNC: 0.3 MG/DL (ref 0.2–1)
BUN SERPL-MCNC: 11 MG/DL (ref 7–18)
BUN/CREAT SERPL: 14 (ref 12–20)
CALCIUM SERPL-MCNC: 8.4 MG/DL (ref 8.5–10.1)
CHLORIDE SERPL-SCNC: 106 MMOL/L (ref 100–108)
CO2 SERPL-SCNC: 26 MMOL/L (ref 21–32)
CREAT SERPL-MCNC: 0.78 MG/DL (ref 0.6–1.3)
DIFFERENTIAL METHOD BLD: ABNORMAL
EOSINOPHIL # BLD: 0 K/UL (ref 0–0.4)
EOSINOPHIL NFR BLD: 1 % (ref 0–5)
ERYTHROCYTE [DISTWIDTH] IN BLOOD BY AUTOMATED COUNT: 12 % (ref 11.6–14.5)
GLOBULIN SER CALC-MCNC: 3.3 G/DL (ref 2–4)
GLUCOSE SERPL-MCNC: 71 MG/DL (ref 74–99)
HCT VFR BLD AUTO: 38 % (ref 35–45)
HGB BLD-MCNC: 13 G/DL (ref 12–16)
LYMPHOCYTES # BLD AUTO: 47 % (ref 21–52)
LYMPHOCYTES # BLD: 2.9 K/UL (ref 0.9–3.6)
MCH RBC QN AUTO: 32.2 PG (ref 24–34)
MCHC RBC AUTO-ENTMCNC: 34.2 G/DL (ref 31–37)
MCV RBC AUTO: 94.1 FL (ref 74–97)
MONOCYTES # BLD: 0.8 K/UL (ref 0.05–1.2)
MONOCYTES NFR BLD AUTO: 12 % (ref 3–10)
NEUTS SEG # BLD: 2.5 K/UL (ref 1.8–8)
NEUTS SEG NFR BLD AUTO: 40 % (ref 40–73)
PLATELET # BLD AUTO: 174 K/UL (ref 135–420)
PMV BLD AUTO: 10.8 FL (ref 9.2–11.8)
POTASSIUM SERPL-SCNC: 3.6 MMOL/L (ref 3.5–5.5)
PROT SERPL-MCNC: 6.9 G/DL (ref 6.4–8.2)
RBC # BLD AUTO: 4.04 M/UL (ref 4.2–5.3)
SERVICE CMNT-IMP: NORMAL
SERVICE CMNT-IMP: NORMAL
SODIUM SERPL-SCNC: 139 MMOL/L (ref 136–145)
WBC # BLD AUTO: 6.2 K/UL (ref 4.6–13.2)

## 2017-03-30 PROCEDURE — 74011250636 HC RX REV CODE- 250/636: Performed by: FAMILY MEDICINE

## 2017-03-30 PROCEDURE — 80053 COMPREHEN METABOLIC PANEL: CPT | Performed by: FAMILY MEDICINE

## 2017-03-30 PROCEDURE — 74011250637 HC RX REV CODE- 250/637: Performed by: FAMILY MEDICINE

## 2017-03-30 PROCEDURE — 85025 COMPLETE CBC W/AUTO DIFF WBC: CPT | Performed by: FAMILY MEDICINE

## 2017-03-30 PROCEDURE — 36415 COLL VENOUS BLD VENIPUNCTURE: CPT | Performed by: FAMILY MEDICINE

## 2017-03-30 RX ORDER — HYDROCODONE BITARTRATE AND ACETAMINOPHEN 5; 325 MG/1; MG/1
1 TABLET ORAL
Qty: 16 TAB | Refills: 0 | Status: SHIPPED | OUTPATIENT
Start: 2017-03-30 | End: 2017-10-05

## 2017-03-30 RX ORDER — POLYETHYLENE GLYCOL 3350 17 G/17G
17 POWDER, FOR SOLUTION ORAL 3 TIMES DAILY
Qty: 90 PACKET | Refills: 0 | Status: SHIPPED | OUTPATIENT
Start: 2017-03-30 | End: 2018-02-24

## 2017-03-30 RX ORDER — DOCUSATE SODIUM 100 MG/1
100 CAPSULE, LIQUID FILLED ORAL
Qty: 30 CAP | Refills: 0 | Status: SHIPPED | OUTPATIENT
Start: 2017-03-30 | End: 2017-06-28

## 2017-03-30 RX ORDER — DICYCLOMINE HYDROCHLORIDE 10 MG/1
10 CAPSULE ORAL 4 TIMES DAILY
Qty: 90 CAP | Refills: 0 | Status: SHIPPED | OUTPATIENT
Start: 2017-03-30 | End: 2018-04-05

## 2017-03-30 RX ORDER — PANTOPRAZOLE SODIUM 40 MG/1
40 TABLET, DELAYED RELEASE ORAL 2 TIMES DAILY
Qty: 60 TAB | Refills: 0 | Status: SHIPPED | OUTPATIENT
Start: 2017-03-30 | End: 2018-04-05

## 2017-03-30 RX ORDER — FACIAL-BODY WIPES
10 EACH TOPICAL
Qty: 30 SUPPOSITORY | Refills: 0 | Status: SHIPPED | OUTPATIENT
Start: 2017-03-30 | End: 2018-04-05

## 2017-03-30 RX ADMIN — DICYCLOMINE HYDROCHLORIDE 10 MG: 10 CAPSULE ORAL at 12:34

## 2017-03-30 RX ADMIN — HYDROCODONE BITARTRATE AND ACETAMINOPHEN 1 TABLET: 5; 325 TABLET ORAL at 04:54

## 2017-03-30 RX ADMIN — NALOXEGOL OXALATE 25 MG: 25 TABLET, FILM COATED ORAL at 07:30

## 2017-03-30 RX ADMIN — HYDROCODONE BITARTRATE AND ACETAMINOPHEN 1 TABLET: 5; 325 TABLET ORAL at 00:48

## 2017-03-30 RX ADMIN — SODIUM CHLORIDE 75 ML/HR: 900 INJECTION, SOLUTION INTRAVENOUS at 00:55

## 2017-03-30 RX ADMIN — ONDANSETRON HYDROCHLORIDE 4 MG: 2 SOLUTION INTRAMUSCULAR; INTRAVENOUS at 00:48

## 2017-03-30 RX ADMIN — DOCUSATE SODIUM 100 MG: 100 CAPSULE, LIQUID FILLED ORAL at 12:34

## 2017-03-30 NOTE — PROGRESS NOTES
Pt states that abdominal pain \"90%\" improved  No N/V  VSS   Chest clear, cor normal, abd-minimal tenderness  Imp:  ETOH related pancreatitis  Plan:  Abstinence discussed in detail w/ pt  PBG level pending  Will follow as need as OP

## 2017-03-30 NOTE — ROUTINE PROCESS
Bedside and Verbal shift change report given to Mustapha Adam (oncoming nurse) by Liss Osborn RN (offgoing nurse). Report included the following information SBAR, Kardex, MAR and Recent Results.     SITUATION:    Code Status: Full Code  Reason for Admission: pancreatitis, intractable vomiting  Abdominal pain   Abd pain    [facility] day: 4   Problem List:       Hospital Problems  Never Reviewed          Codes Class Noted POA    Pancreatitis, acute ICD-10-CM: K85.90  ICD-9-CM: 952.1  3/24/2017 Yes        Abdominal pain, acute, generalized ICD-10-CM: R10.84  ICD-9-CM: 789.07, 338.19  3/24/2017 Yes        Abdominal pain ICD-10-CM: R10.9  ICD-9-CM: 789.00  3/28/2014 Unknown              BACKGROUND:    Past Medical History:   Past Medical History:   Diagnosis Date    Bipolar 1 disorder, depressed ([facility] [address]     Neurological disorder     Psychiatric disorder          Patient taking anticoagulants yes     ASSESSMENT:    Changes in Assessment Throughout Shift: none     Patient has Central Line: no Reasons if yes:    Patient has Ramos Cath: no Reasons if yes:       Last Vitals:     Vitals:    03/29/17 1046 03/29/17 1604 03/29/17 2025 03/30/17 0436   BP: 118/72 115/68 131/58 102/58   Pulse: (!) 56 63 63 (!) 58   Resp: 18 16 15 17   Temp: 97.9 °F (36.6 °C) 98.1 °F (36.7 °C) 97.7 °F (36.5 °C) 98.4 °F (36.9 °C)   SpO2: 99% 99% 99% 100%   Weight:       Height:            IV and DRAINS (will only show if present)   [REMOVED] Peripheral IV 03/25/17 Right Forearm-Site Assessment: Clean, dry, & intact  Peripheral IV 03/27/17 Left Wrist-Site Assessment: Clean, dry, & intact  [REMOVED] Peripheral IV 03/23/17 Right Antecubital-Site Assessment: Clean, dry, & intact     WOUND (if present)   Wound Type:  none   Dressing present Dressing Present : No   Wound Concerns/Notes:  none     PAIN    Pain Assessment    Pain Intensity 1: 4 (03/30/17 0529)    Pain Location 1: Abdomen    Pain Intervention(s) 1: Medication (see MAR)    Patient Stated Pain Goal: 0  o Interventions for Pain:Yes  o Intervention effective: yes  o Time of last intervention: See MAR   o Reassessment Completed: yes      Last 3 Weights:  Last 3 Recorded Weights in this Encounter    03/23/17 1720   Weight: 52.2 kg (115 lb)     Weight change:      INTAKE/OUPUT    Current Shift: 03/29 1901 - 03/30 0700  In: 3861.3 [P.O.:260; I.V.:3601.3]  Out: -     Last three shifts: 03/28 0701 - 03/29 1900  In: 860 [P.O.:860]  Out: 1900 [Urine:1900]     LAB RESULTS     Recent Labs      03/30/17   0258  03/29/17   0305  03/28/17   0330   WBC  6.2  7.6  7.5   HGB  13.0  12.3  13.3   HCT  38.0  36.8  38.5   PLT  174  195  207        Recent Labs      03/30/17   0258  03/29/17   0305  03/28/17   0330   NA  139  140  140   K  3.6  3.7  3.5   GLU  71*  83  95   BUN  11  13  11   CREA  0.78  0.81  1.20   CA  8.4*  8.2*  8.7   MG   --   2.1  2.4       RECOMMENDATIONS AND DISCHARGE PLANNING     1. Pending tests/procedures/ Plan of Care or Other Needs: yes     2. Discharge plan for patient and Needs/Barriers: TBD    3. Estimated Discharge Date: TBD Posted on Whiteboard in Patients Room: no      4. The patient's care plan was reviewed with the oncoming nurse. \"HEALS\" SAFETY CHECK      Fall Risk    Total Score: 1    Safety Measures: Safety Measures: Bed/Chair-Wheels locked, Bed in low position, Side rails X 3    A safety check occurred in the patient's room between off going nurse and oncoming nurse listed above.     The safety check included the below items  Area Items   H  High Alert Medications - Verify all high alert medication drips (heparin, PCA, etc.)   E  Equipment - Suction is set up for ALL patients (with yanker)  - Red plugs utilized for all equipment (IV pumps, etc.)  - WOWs wiped down at end of shift.  - Room stocked with oxygen, suction, and other unit-specific supplies   A  Alarms - Bed alarm is set for fall risk patients  - Ensure chair alarm is in place and activated if patient is up in a chair   L  Lines - Check IV for any infiltration  - Ramos bag is empty if patient has a Ramos   - Tubing and IV bags are labeled   S  Safety   - Room is clean, patient is clean, and equipment is clean. - Hallways are clear from equipment besides carts. - Fall bracelet on for fall risk patients  - Ensure room is clear and free of clutter  - Suction is set up for ALL patients (with yanker)  - Hallways are clear from equipment besides carts.    - Isolation precautions followed, supplies available outside room, sign posted     Carlyn Serna RN

## 2017-03-30 NOTE — DISCHARGE SUMMARY
Discharge Summary     Patient: Gustavo Rodriguez MRN: [id number]  SSN: [id number]    YOB: 1990  Age: 23 y.o. Sex: female       Admit Date: 3/23/2017    Discharge Date: 3/30/2017      Admission Diagnoses: pancreatitis, intractable vomiting  Abdominal pain  Abd pain    Discharge Diagnoses:   Problem List as of 3/30/2017  Never Reviewed          Codes Class Noted - Resolved    Pancreatitis, acute ICD-10-CM: K85.90  ICD-9-CM: 571.8  3/24/2017 - Present        Abdominal pain, acute, generalized ICD-10-CM: R10.84  ICD-9-CM: 789.07, 338.19  3/24/2017 - Present        Abdominal pain ICD-10-CM: R10.9  ICD-9-CM: 789.00  3/28/2014 - Present        Vomiting ICD-10-CM: R11.10  ICD-9-CM: 787.03  3/28/2014 - Present        UTI (urinary tract infection) ICD-10-CM: N39.0  ICD-9-CM: 599.0  3/28/2014 - Present        BV (bacterial vaginosis) ICD-10-CM: N76.0, B96.89  ICD-9-CM: 616.10, 041.9  3/28/2014 - Present               Discharge Condition: Stable    Hospital Course:   Gustavo Rodriguez is a 23 y.o.  female who has h/o poor compliant last seen at my office 3 years ago . Pt presented to ER c/o nausea and vomiting x 20 since waking up this morning. Pt also c/o RLQ, abdominal pain that radiates to the right flank. Pt also has Lt upper quadrant pain       Pt also c/o vaginal spotting but states that it is because she is due for Depovera shot. She has been f/u [facility] care at [facility]  She denies any dysuria, frequency, vaginal discharge, diarrhea, or fever associated with her symptoms. Pt has surgival hx of Morina removal from abdomen and  section.       She states that she is allergic to Doxycycline and Latex. Pt admits to occasional tobacco use and alcohol use. she reported she had one hard Drink 2 days ago      She denies any illicit drug use.  The patient reports no other symptoms and has no other concerns at this time.       CT scan abdomen and pelvis  Moderate amount of retained stool in proximal and mid colon.      Normal appendix identified.      No definable acute processes in abdomen or in pelvis.           Vaginal U/S  1. Normal uterus and endometrial stripe   2. Normal ovaries. 3. Small amount of free fluid in the cul-de-sac.     CXR  Negative study.       EKG pending  U/S gallbladder    No diagnostic abnormality as described.     Urine drug screen positive THC and opiates. Blood culture negative   GC/CT urine culture is negative . CXR negative,    Pt feeling better  repeat CT scan No acute finding. Pt had upper endoscopy biopsy was taken urine porphobilinogen and follow up as outpatient     Discussed with nursing staff    Consults: Gastroenterology        Physical Examination:     General:  Alert, cooperative, no distress, appears stated age. Head:  Normocephalic, without obvious abnormality, atraumatic. Eyes:  Conjunctivae/corneas clear. PERRL, EOMs intact. Nose: Nares normal. No drainage or sinus tenderness. Throat: Lips, mucosa, and tongue normal   Neck: Supple, symmetrical, trachea midline, no adenopathy, thyroid: no enlargement/tenderness/nodules, no carotid bruit and no JVD. Back:  ROM normal. No CVA tenderness. Lungs:  Clear to auscultation bilaterally. Chest wall:  No tenderness or deformity. Heart:  Regular rate and rhythm, S1, S2 normal, no murmur, click, rub or gallop. Abdomen: Soft, tender Lt upper quadrant epigastric tenderness imoproved . Bowel sounds normal. No masses, No organomegaly. no regidity   Extremities: Extremities normal, atraumatic, no cyanosis or edema. Pulses: 2+ and symmetric all extremities. Skin: Skin color, texture, turgor normal. No rashes or lesions   Neurologic: CNII-XII intact. No focal motor or sensory deficit. Disposition: home     Current Discharge Medication List      START taking these medications    Details   polyethylene glycol (MIRALAX) 17 gram packet Take 1 Packet by mouth three (3) times daily.   Qty: 90 Packet, Refills: 0 naloxegol (MOVANTIK) 25 mg tab tablet Take 1 Tab by mouth Daily (before breakfast). Qty: 30 Tab, Refills: 0      bisacodyl (DULCOLAX) 10 mg suppository Insert 10 mg into rectum daily as needed. Qty: 30 Suppository, Refills: 0      dicyclomine (BENTYL) 10 mg capsule Take 1 Cap by mouth four (4) times daily. Qty: 90 Cap, Refills: 0      docusate sodium (COLACE) 100 mg capsule Take 1 Cap by mouth two (2) times daily as needed for Constipation for up to 90 days. Qty: 30 Cap, Refills: 0      pantoprazole (PROTONIX) 40 mg tablet Take 1 Tab by mouth two (2) times a day. Qty: 60 Tab, Refills: 0         CONTINUE these medications which have CHANGED    Details   HYDROcodone-acetaminophen (NORCO) 5-325 mg per tablet Take 1 Tab by mouth every six (6) hours as needed. Max Daily Amount: 4 Tabs. Qty: 16 Tab, Refills: 0         CONTINUE these medications which have NOT CHANGED    Details   ondansetron hcl (ZOFRAN, AS HYDROCHLORIDE,) 4 mg tablet Take 1 tablet by mouth every eight (8) hours as needed for Nausea. Qty: 12 tablet, Refills: 0      PARoxetine (PAXIL) 30 mg tablet Take 30 mg by mouth daily. STOP taking these medications       ondansetron (ZOFRAN ODT) 4 mg disintegrating tablet Comments:   Reason for Stopping:         methocarbamol (ROBAXIN) 500 mg tablet Comments:   Reason for Stopping:         oxazepam (SERAX) 30 mg capsule Comments:   Reason for Stopping:             Activity: Activity as tolerated  Diet: Regular Diet  Wound Care: None needed    Follow-up Appointments   Procedures    FOLLOW UP VISIT Appointment in: Other (Veronika Martinez) F/u Monday PCP Dr Tiarra Alba . F/u GI Dr Lam Goddard [facility]weeks [facility]     F/u Monday PCP Dr Tiarra Alba . F/u GI Dr Lam Goddard in 2weeks [facility]     Standing Status:   Standing     Number of Occurrences:   1     Order Specific Question:   Appointment in     Answer:    Other (Specify)       Signed By: Peace Lawrence MD     March 30, 2017

## 2017-03-30 NOTE — PROGRESS NOTES
3341- No vomiting. Nausea well controlled with IV medication and Abdominal pain minimally controlled with PO pain medication. Ambulatory to  with no assistance. Voiding yellow/straw urine. Pt ambulated in the hallways as recommended.

## 2017-03-30 NOTE — ANESTHESIA POSTPROCEDURE EVALUATION
Post-Anesthesia Evaluation and Assessment    Patient: Jalen Boyd MRN: [id number]  SSN: [id number]    YOB: 1990  Age: 23 y.o. Sex: female       Cardiovascular Function/Vital Signs  Visit Vitals    /68 (BP 1 Location: Right arm, BP Patient Position: Supine)    Pulse 63    Temp 36.7 °C (98.1 °F)    Resp 16    Ht 4' 8\" (1.422 m)    Wt 52.2 kg (115 lb)    SpO2 99%    Breastfeeding No    BMI 25.78 kg/m2       Patient is status post MAC anesthesia for Procedure(s):  ESOPHAGOGASTRODUODENOSCOPY (EGD) w/ biopsies. Nausea/Vomiting: None    Postoperative hydration reviewed and adequate. Pain: none    Neurological Status: At baseline    Mental Status and Level of Consciousness: Arousable    Pulmonary Status:   O2 Device: Room air (03/29/17 1046)   Adequate oxygenation and airway patent    Complications related to anesthesia: None    Post-anesthesia assessment completed.  No concerns    Signed By: Jay Miller MD     March 29, 2017

## 2017-03-30 NOTE — PROGRESS NOTES
Discharge instructions and prescriptions were explained and given to patient. Patient armband removed and shredded.

## 2017-04-03 LAB — PBG DEAMINASE, RBC, PBDGLT: 3.32 MU/G HB (ref 2.1–4.3)

## 2017-05-11 ENCOUNTER — HOSPITAL ENCOUNTER (OUTPATIENT)
Dept: NUCLEAR MEDICINE | Age: 27
Discharge: HOME | End: 2017-05-11
Attending: INTERNAL MEDICINE
Payer: MEDICAID

## 2017-05-11 ENCOUNTER — HOSPITAL ENCOUNTER (OUTPATIENT)
Dept: LAB | Age: 27
Discharge: HOME | End: 2017-05-11

## 2017-05-11 ENCOUNTER — HOSPITAL ENCOUNTER (OUTPATIENT)
Dept: GENERAL RADIOLOGY | Age: 27
Discharge: HOME | End: 2017-05-11
Attending: INTERNAL MEDICINE
Payer: MEDICAID

## 2017-05-11 VITALS — WEIGHT: 107 LBS | BODY MASS INDEX: 23.99 KG/M2

## 2017-05-11 DIAGNOSIS — R11.0 NAUSEA: ICD-10-CM

## 2017-05-11 PROCEDURE — 78227 HEPATOBIL SYST IMAGE W/DRUG: CPT

## 2017-05-11 PROCEDURE — 99001 SPECIMEN HANDLING PT-LAB: CPT | Performed by: INTERNAL MEDICINE

## 2017-05-11 PROCEDURE — 74011250636 HC RX REV CODE- 250/636: Performed by: INTERNAL MEDICINE

## 2017-05-11 PROCEDURE — 74250 X-RAY XM SM INT 1CNTRST STD: CPT

## 2017-05-11 PROCEDURE — 74011000258 HC RX REV CODE- 258: Performed by: INTERNAL MEDICINE

## 2017-05-11 RX ORDER — SODIUM CHLORIDE 9 MG/ML
50 INJECTION, SOLUTION INTRAVENOUS
Status: COMPLETED | OUTPATIENT
Start: 2017-05-11 | End: 2017-05-11

## 2017-05-11 RX ADMIN — SODIUM CHLORIDE 50 ML/HR: 900 INJECTION, SOLUTION INTRAVENOUS at 08:00

## 2017-05-11 RX ADMIN — SINCALIDE 1 MCG: 5 INJECTION, POWDER, LYOPHILIZED, FOR SOLUTION INTRAVENOUS at 08:00

## 2017-05-24 ENCOUNTER — HOSPITAL ENCOUNTER (EMERGENCY)
Age: 27
Discharge: HOME | End: 2017-05-24
Attending: EMERGENCY MEDICINE | Admitting: EMERGENCY MEDICINE
Payer: MEDICAID

## 2017-05-24 ENCOUNTER — APPOINTMENT (OUTPATIENT)
Dept: ULTRASOUND IMAGING | Age: 27
End: 2017-05-24
Attending: PHYSICIAN ASSISTANT
Payer: MEDICAID

## 2017-05-24 VITALS
HEIGHT: 56 IN | TEMPERATURE: 97.7 F | BODY MASS INDEX: 24.07 KG/M2 | HEART RATE: 63 BPM | WEIGHT: 107 LBS | SYSTOLIC BLOOD PRESSURE: 142 MMHG | DIASTOLIC BLOOD PRESSURE: 85 MMHG | RESPIRATION RATE: 16 BRPM | OXYGEN SATURATION: 99 %

## 2017-05-24 DIAGNOSIS — N93.8 DUB (DYSFUNCTIONAL UTERINE BLEEDING): ICD-10-CM

## 2017-05-24 DIAGNOSIS — N76.0 BV (BACTERIAL VAGINOSIS): Primary | ICD-10-CM

## 2017-05-24 DIAGNOSIS — R31.9 URINARY TRACT INFECTION WITH HEMATURIA, SITE UNSPECIFIED: ICD-10-CM

## 2017-05-24 DIAGNOSIS — N39.0 URINARY TRACT INFECTION WITH HEMATURIA, SITE UNSPECIFIED: ICD-10-CM

## 2017-05-24 DIAGNOSIS — B96.89 BV (BACTERIAL VAGINOSIS): Primary | ICD-10-CM

## 2017-05-24 LAB
ANION GAP BLD CALC-SCNC: 19 MMOL/L (ref 10–20)
APPEARANCE UR: CLEAR
BACTERIA URNS QL MICRO: ABNORMAL /HPF
BILIRUB UR QL: NEGATIVE
BUN BLD-MCNC: 7 MG/DL (ref 7–18)
CA-I BLD-MCNC: 1.18 MMOL/L (ref 1.12–1.32)
CHLORIDE BLD-SCNC: 103 MMOL/L (ref 100–108)
CO2 BLD-SCNC: 24 MMOL/L (ref 19–24)
COLOR UR: YELLOW
CREAT UR-MCNC: 0.7 MG/DL (ref 0.6–1.3)
EPITH CASTS URNS QL MICRO: ABNORMAL /LPF (ref 0–5)
GLUCOSE BLD STRIP.AUTO-MCNC: 89 MG/DL (ref 74–106)
GLUCOSE UR STRIP.AUTO-MCNC: NEGATIVE MG/DL
HCG UR QL: NEGATIVE
HCT VFR BLD CALC: 40 % (ref 36–49)
HGB BLD-MCNC: 13.6 G/DL (ref 12–16)
HGB UR QL STRIP: ABNORMAL
KETONES UR QL STRIP.AUTO: ABNORMAL MG/DL
LEUKOCYTE ESTERASE UR QL STRIP.AUTO: ABNORMAL
MUCOUS THREADS URNS QL MICRO: ABNORMAL /LPF
NITRITE UR QL STRIP.AUTO: NEGATIVE
PH UR STRIP: 7 [PH] (ref 5–8)
POTASSIUM BLD-SCNC: 3.7 MMOL/L (ref 3.5–5.5)
PROT UR STRIP-MCNC: NEGATIVE MG/DL
RBC #/AREA URNS HPF: ABNORMAL /HPF (ref 0–5)
SERVICE CMNT-IMP: NORMAL
SODIUM BLD-SCNC: 141 MMOL/L (ref 136–145)
SP GR UR REFRACTOMETRY: 1.02 (ref 1–1.03)
UROBILINOGEN UR QL STRIP.AUTO: 1 EU/DL (ref 0.2–1)
WBC URNS QL MICRO: ABNORMAL /HPF (ref 0–4)
WET PREP GENITAL: NORMAL

## 2017-05-24 PROCEDURE — 81025 URINE PREGNANCY TEST: CPT | Performed by: EMERGENCY MEDICINE

## 2017-05-24 PROCEDURE — 99283 EMERGENCY DEPT VISIT LOW MDM: CPT

## 2017-05-24 PROCEDURE — 99284 EMERGENCY DEPT VISIT MOD MDM: CPT

## 2017-05-24 PROCEDURE — 74011250637 HC RX REV CODE- 250/637: Performed by: PHYSICIAN ASSISTANT

## 2017-05-24 PROCEDURE — 76830 TRANSVAGINAL US NON-OB: CPT

## 2017-05-24 PROCEDURE — 81001 URINALYSIS AUTO W/SCOPE: CPT | Performed by: EMERGENCY MEDICINE

## 2017-05-24 PROCEDURE — 96360 HYDRATION IV INFUSION INIT: CPT

## 2017-05-24 PROCEDURE — 80047 BASIC METABLC PNL IONIZED CA: CPT

## 2017-05-24 PROCEDURE — 96361 HYDRATE IV INFUSION ADD-ON: CPT

## 2017-05-24 PROCEDURE — 74011250636 HC RX REV CODE- 250/636: Performed by: PHYSICIAN ASSISTANT

## 2017-05-24 PROCEDURE — 87491 CHLMYD TRACH DNA AMP PROBE: CPT | Performed by: PHYSICIAN ASSISTANT

## 2017-05-24 PROCEDURE — 87210 SMEAR WET MOUNT SALINE/INK: CPT | Performed by: PHYSICIAN ASSISTANT

## 2017-05-24 RX ORDER — SODIUM CHLORIDE 9 MG/ML
1000 INJECTION, SOLUTION INTRAVENOUS ONCE
Status: COMPLETED | OUTPATIENT
Start: 2017-05-24 | End: 2017-05-24

## 2017-05-24 RX ORDER — SULFAMETHOXAZOLE AND TRIMETHOPRIM 800; 160 MG/1; MG/1
1 TABLET ORAL 2 TIMES DAILY
Qty: 20 TAB | Refills: 0 | Status: SHIPPED | OUTPATIENT
Start: 2017-05-24 | End: 2017-06-03

## 2017-05-24 RX ORDER — METRONIDAZOLE 500 MG/1
500 TABLET ORAL 2 TIMES DAILY
Qty: 14 TAB | Refills: 0 | Status: SHIPPED | OUTPATIENT
Start: 2017-05-24 | End: 2017-05-31

## 2017-05-24 RX ORDER — OXYCODONE AND ACETAMINOPHEN 5; 325 MG/1; MG/1
1 TABLET ORAL
Status: COMPLETED | OUTPATIENT
Start: 2017-05-24 | End: 2017-05-24

## 2017-05-24 RX ADMIN — OXYCODONE HYDROCHLORIDE AND ACETAMINOPHEN 1 TABLET: 5; 325 TABLET ORAL at 17:49

## 2017-05-24 RX ADMIN — SODIUM CHLORIDE 1000 ML: 900 INJECTION, SOLUTION INTRAVENOUS at 15:32

## 2017-05-24 NOTE — ED TRIAGE NOTES
\"I have been bleeding for a month and cramping for the last few days\". Patient reports she is currently on Depo Povera.

## 2017-05-24 NOTE — ED PROVIDER NOTES
HPI Comments: 2:89 PM Abhinav Montero is a 33 y.o. female who presents to the ED via POV c/o abnormal heavy vaginal bleeding that began 1 month ago. The pt is also complaining of lower diffuse bilateral abd pain that began 2-3 days ago. She is on Depo-provera and has been for an extended period of time. The pt was advised to come to ED by her gynecologist if her pain increased. Per the pt, this is the first time she has experienced these sxs while on the shot. The pt denies diarrhea and constipation. No other complaints or concerns at this time. The history is provided by the patient. Past Medical History:   Diagnosis Date    Bipolar 1 disorder, depressed ([facility] [address]     Neurological disorder     Psychiatric disorder        Past Surgical History:   Procedure Laterality Date    UPPER GI ENDOSCOPY,BIOPSY  3/29/2017              History reviewed. No pertinent family history. Social History     Social History    Marital status: SINGLE     Spouse name: N/A    Number of children: N/A    Years of education: N/A     Occupational History    Not on file. Social History Main Topics    Smoking status: Never Smoker    Smokeless tobacco: Not on file    Alcohol use 0.5 oz/week     1 drink(s) per week    Drug use: No    Sexual activity: Yes     Partners: Male     Birth control/ protection: None     Other Topics Concern    Not on file     Social History Narrative         ALLERGIES: Latex, natural rubber and Doxycycline    Review of Systems   Constitutional: Negative. Negative for diaphoresis and fever. HENT: Negative. Negative for ear pain, rhinorrhea and trouble swallowing. Eyes: Negative. Negative for visual disturbance. Respiratory: Negative. Negative for cough and shortness of breath. Cardiovascular: Negative. Negative for chest pain and leg swelling. Gastrointestinal: Positive for abdominal pain (diffuse bilateral lower).  Negative for blood in stool, constipation, diarrhea, nausea and vomiting. Endocrine: Negative. Genitourinary: Positive for vaginal bleeding (heavy). Negative for difficulty urinating, flank pain, frequency and hematuria. Musculoskeletal: Negative. Negative for back pain and neck pain. Skin: Negative. Negative for rash. Allergic/Immunologic: Negative. Neurological: Negative. Negative for dizziness, seizures, weakness, numbness and headaches. Hematological: Negative. Psychiatric/Behavioral: Negative. All other systems reviewed and are negative. Vitals:    05/24/17 1237   BP: 142/85   Pulse: (!) 123   Resp: 16   Temp: 97.7 °F (36.5 °C)   SpO2: 99%   Weight: 48.5 kg (107 lb)   Height: 4' 8\" (1.422 m)            Physical Exam   Constitutional: She is oriented to person, place, and time. She appears well-developed and well-nourished. She appears distressed. HENT:   Head: Normocephalic. Neck: Normal range of motion. Neck supple. Cardiovascular: Regular rhythm and normal heart sounds. Exam reveals no gallop and no friction rub. No murmur heard. Slightly tachycardiac    Pulmonary/Chest: Effort normal and breath sounds normal. No stridor. No respiratory distress. She has no wheezes. She has no rales. Genitourinary: No vaginal discharge found. Genitourinary Comments: No vaginal bleeding or discharge noted, no evidence of cervicitis, no palpable masses, R sided adnexal tenderness noted on bimanual exam   Musculoskeletal: Normal range of motion. Neurological: She is alert and oriented to person, place, and time. Coordination normal.   Gait is steady. Able to ambulate without difficulty. Skin: Skin is warm and dry. No rash noted. She is not diaphoretic. No erythema. Psychiatric: She has a normal mood and affect. Her behavior is normal. Thought content normal.   Nursing note and vitals reviewed.        MDM  Number of Diagnoses or Management Options  Diagnosis management comments: Impression:  DUB, BV, UTI    Wet pre few clue cells   UA trace ketones, mod blood, trace leuk esterase, 4 + mucus, 2 + bacteria  Urine hcg negative  POC Chem 8 unremarkable    US: 1. Unremarkable exam for patient's age.  -No sonographic evidence for ovarian torsion    Patient is stable for discharge at this time. Rx for flagyl and bactrim given. Rest and follow-up with OBGYN this week. Return to the ED immediately for any new or worsening sx. Elvira Cook PA-C 5:29 PM          Amount and/or Complexity of Data Reviewed  Clinical lab tests: ordered and reviewed  Tests in the radiology section of CPT®: ordered and reviewed    Risk of Complications, Morbidity, and/or Mortality  Presenting problems: moderate  Diagnostic procedures: moderate  Management options: low    Patient Progress  Patient progress: stable    ED Course       Procedures      Vitals:  Patient Vitals for the past 12 hrs:   Temp Pulse Resp BP SpO2   05/24/17 1237 97.7 °F (36.5 °C) (!) 123 16 142/85 99 %   99 % Pulse ox reviewed and WNL.        Medications ordered:   Medications   0.9% sodium chloride infusion 1,000 mL (1,000 mL IntraVENous New Bag 5/24/17 1532)         Lab findings:  Recent Results (from the past 12 hour(s))   URINALYSIS W/ RFLX MICROSCOPIC    Collection Time: 05/24/17  2:05 PM   Result Value Ref Range    Color YELLOW      Appearance CLEAR      Specific gravity 1.022 1.005 - 1.030      pH (UA) 7.0 5.0 - 8.0      Protein NEGATIVE  NEG mg/dL    Glucose NEGATIVE  NEG mg/dL    Ketone TRACE (A) NEG mg/dL    Bilirubin NEGATIVE  NEG      Blood MODERATE (A) NEG      Urobilinogen 1.0 0.2 - 1.0 EU/dL    Nitrites NEGATIVE  NEG      Leukocyte Esterase TRACE (A) NEG     HCG URINE, QL    Collection Time: 05/24/17  2:05 PM   Result Value Ref Range    HCG urine, Ql. NEGATIVE  NEG     POC CHEM8    Collection Time: 05/24/17  2:05 PM   Result Value Ref Range    CO2 (POC) 24 19 - 24 MMOL/L    Glucose (POC) 89 74 - 106 MG/DL    BUN (POC) 7 7 - 18 MG/DL    Creatinine (POC) 0.7 0.6 - 1.3 MG/DL    GFR-AA (POC) >60 >60 ml/min/1.73m2    GFR, non-AA (POC) >60 >60 ml/min/1.73m2    Sodium (POC) 141 136 - 145 MMOL/L    Potassium (POC) 3.7 3.5 - 5.5 MMOL/L    Calcium, ionized (POC) 1.18 1.12 - 1.32 MMOL/L    Chloride (POC) 103 100 - 108 MMOL/L    Anion gap (POC) 19 10 - 20      Hematocrit (POC) 40 36 - 49 %    Hemoglobin (POC) 13.6 12 - 16 G/DL   URINE MICROSCOPIC ONLY    Collection Time: 05/24/17  2:05 PM   Result Value Ref Range    WBC 0 to 3 0 - 4 /hpf    RBC 4 to 10 0 - 5 /hpf    Epithelial cells 2+ 0 - 5 /lpf    Bacteria 2+ (A) NEG /hpf    Mucus 4+ (A) NEG /lpf   WET PREP    Collection Time: 05/24/17  4:18 PM   Result Value Ref Range    Special Requests: NO SPECIAL REQUESTS      Wet prep FEW  CLUE CELLS PRESENT        Wet prep NO YEAST SEEN      Wet prep NO TRICHOMONAS SEEN         EKG interpretation by ED Physician:        X-Ray, CT or other radiology findings or impressions:  US TRANSVAGINAL   Final Result   IMPRESSION:     1. Unremarkable exam for patient's age.  -No sonographic evidence for ovarian torsion         Progress notes, Consult notes or additional Procedure notes:        Disposition:  Diagnosis: No diagnosis found. Disposition: Discharge     SCRIBE ATTESTATION STATEMENT  Documented by: Man xie for, and in the presence of, Elvira Cook PA-C 05/24/17 5:14 PM     Signed by: Elda Garcia, 05/24/17 2:35 PM     PROVIDER ATTESTATION STATEMENT  I personally performed the services described in the documentation, reviewed the documentation, as recorded by the scribe in my presence, and it accurately and completely records my words and actions.   [place], [place]

## 2017-05-24 NOTE — DISCHARGE INSTRUCTIONS
Bacterial Vaginosis: Care Instructions  Your Care Instructions    Bacterial vaginosis is a type of vaginal infection. It is caused by excess growth of certain bacteria that are normally found in the vagina. Symptoms can include itching, swelling, pain when you urinate or have sex, and a gray or yellow discharge with a \"fishy\" odor. It is not considered an infection that is spread through sexual contact. Although symptoms can be annoying and uncomfortable, bacterial vaginosis does not usually cause other health problems. However, if you have it while you are pregnant, it can cause complications. While the infection may go away on its own, most doctors use antibiotics to treat it. You may have been prescribed pills or vaginal cream. With treatment, bacterial vaginosis usually clears up in 5 to 7 days. Follow-up care is a key part of your treatment and safety. Be sure to make and go to all appointments, and call your doctor if you are having problems. It's also a good idea to know your test results and keep a list of the medicines you take. How can you care for yourself at home? · Take your antibiotics as directed. Do not stop taking them just because you feel better. You need to take the full course of antibiotics. · Do not eat or drink anything that contains alcohol if you are taking metronidazole (Flagyl). · Keep using your medicine if you start your period. Use pads instead of tampons while using a vaginal cream or suppository. Tampons can absorb the medicine. · Wear loose cotton clothing. Do not wear nylon and other materials that hold body heat and moisture close to the skin. · Do not scratch. Relieve itching with a cold pack or a cool bath. · Do not wash your vaginal area more than once a day. Use plain water or a mild, unscented soap. Do not douche. When should you call for help?   Watch closely for changes in your health, and be sure to contact your doctor if:  · You have unexpected vaginal bleeding. · You have a fever. · You have new or increased pain in your vagina or pelvis. · You are not getting better after 1 week. · Your symptoms return after you finish the course of your medicine. Where can you learn more? Go to http://gerardo-stan.info/. Elham Bergeron in the search box to learn more about \"Bacterial Vaginosis: Care Instructions. \"  Current as of: October 13, 2016  Content Version: 11.2  © 6564-6499 [employer]. Care instructions adapted under license by [place]. com. cn (which disclaims liability or warranty for this information). If you have questions about a medical condition or this instruction, always ask your healthcare professional. [address] any warranty or liability for your use of this information. Abnormal Uterine Bleeding in Teens: Care Instructions  Your Care Instructions  Abnormal uterine bleeding (AUB) is irregular bleeding from the uterus that is longer or heavier than usual or does not occur at your regular time. Sometimes it's because of changes in hormone levels or growths in the uterus such as fibroids or polyps. Sometimes a cause cannot be found. You may have heavy bleeding when you are not expecting your period. Your doctor may suggest a pregnancy test, if you think you are pregnant. Follow-up care is a key part of your treatment and safety. Be sure to make and go to all appointments, and call your doctor if you are having problems. It's also a good idea to know your test results and keep a list of the medicines you take. How can you care for yourself at home? · Be safe with medicines. Read and follow all instructions on the label. ¨ If the doctor gave you a prescription medicine for pain, take it as prescribed. ¨ If you are not taking a prescription pain medicine, ask your doctor if you can take an over-the-counter medicine. · You may be low in iron because of blood loss.  Eat a balanced diet that is high in iron and vitamin C. Foods with a lot of iron include red meat, shellfish, and eggs. They also include beans and leafy green vegetables. Talk to your doctor about taking iron pills or a multivitamin. When should you call for help? Call 911 anytime you think you may need emergency care. For example, call if:  · You passed out (lost consciousness). · You have sudden, severe pain in your belly or pelvis. Call your doctor now or seek immediate medical care if:  · You have severe vaginal bleeding. You are soaking through your usual pads or tampons each hour for 2 or more hours. · You are dizzy or lightheaded, or you feel like you may faint. · You have new belly or pelvic pain. Watch closely for changes in your health, and be sure to contact your doctor if:  · You have a fever. · Your bleeding gets worse or lasts longer than 1 week. · You think you may be pregnant. Where can you learn more? Go to http://gerardo-stan.info/. Enter Parisa Prasad in the search box to learn more about \"Abnormal Uterine Bleeding in Teens: Care Instructions. \"  Current as of: October 13, 2016  Content Version: 11.2  © 0387-6653 [employer]. Care instructions adapted under license by [employer] (which disclaims liability or warranty for this information). If you have questions about a medical condition or this instruction, always ask your healthcare professional. [place] [address] any warranty or liability for your use of this information. Kynogon Activation    Thank you for requesting access to Kynogon. Please follow the instructions below to securely access and download your online medical record. Kynogon allows you to send messages to your doctor, view your test results, renew your prescriptions, schedule appointments, and more. How Do I Sign Up? 1. In your internet browser, go to [email or web address]  2. Click on the First Time User?  Click Here link in the Sign In box. You will be redirect to the New Member Sign Up page. 3. Enter your Gemisimot Access Code exactly as it appears below. You will not need to use this code after youve completed the sign-up process. If you do not sign up before the expiration date, you must request a new code. MyChart Access Code: Activation code not generated  Current Faveeo Status: Patient Declined (This is the date your MyChart access code will )    4. Enter the last four digits of your Social Security Number (xxxx) and Date of Birth (mm/dd/yyyy) as indicated and click Submit. You will be taken to the next sign-up page. 5. Create a Gemisimot ID. This will be your Faveeo login ID and cannot be changed, so think of one that is secure and easy to remember. 6. Create a Faveeo password. You can change your password at any time. 7. Enter your Password Reset Question and Answer. This can be used at a later time if you forget your password. 8. Enter your e-mail address. You will receive e-mail notification when new information is available in [address] 9. Click Sign Up. You can now view and download portions of your medical record. 10. Click the Download Summary menu link to download a portable copy of your medical information. Additional Information    If you have questions, please visit the Frequently Asked Questions section of the Faveeo website at [email or web address] Remember, Faveeo is NOT to be used for urgent needs. For medical emergencies, dial 911.

## 2017-05-25 LAB
C TRACH RRNA SPEC QL NAA+PROBE: NEGATIVE
N GONORRHOEA RRNA SPEC QL NAA+PROBE: NEGATIVE
SPECIMEN SOURCE: NORMAL

## 2017-10-05 ENCOUNTER — HOSPITAL ENCOUNTER (EMERGENCY)
Age: 27
Discharge: HOME | End: 2017-10-05
Attending: EMERGENCY MEDICINE
Payer: MEDICAID

## 2017-10-05 VITALS
HEIGHT: 56 IN | SYSTOLIC BLOOD PRESSURE: 123 MMHG | RESPIRATION RATE: 20 BRPM | WEIGHT: 101 LBS | TEMPERATURE: 97.4 F | HEART RATE: 71 BPM | DIASTOLIC BLOOD PRESSURE: 63 MMHG | OXYGEN SATURATION: 98 % | BODY MASS INDEX: 22.72 KG/M2

## 2017-10-05 DIAGNOSIS — R31.9 URINARY TRACT INFECTION WITH HEMATURIA, SITE UNSPECIFIED: ICD-10-CM

## 2017-10-05 DIAGNOSIS — R10.30 ABDOMINAL PAIN, LOWER: Primary | ICD-10-CM

## 2017-10-05 DIAGNOSIS — N39.0 URINARY TRACT INFECTION WITH HEMATURIA, SITE UNSPECIFIED: ICD-10-CM

## 2017-10-05 DIAGNOSIS — R10.9 FLANK PAIN: ICD-10-CM

## 2017-10-05 DIAGNOSIS — B96.89 BV (BACTERIAL VAGINOSIS): ICD-10-CM

## 2017-10-05 DIAGNOSIS — N76.0 BV (BACTERIAL VAGINOSIS): ICD-10-CM

## 2017-10-05 LAB
ALBUMIN SERPL-MCNC: 4.1 G/DL (ref 3.4–5)
ALBUMIN/GLOB SERPL: 1.1 {RATIO} (ref 0.8–1.7)
ALP SERPL-CCNC: 126 U/L (ref 45–117)
ALT SERPL-CCNC: 31 U/L (ref 13–56)
ANION GAP SERPL CALC-SCNC: 9 MMOL/L (ref 3–18)
APPEARANCE UR: CLEAR
AST SERPL-CCNC: 20 U/L (ref 15–37)
BACTERIA URNS QL MICRO: ABNORMAL /HPF
BASOPHILS # BLD: 0 K/UL (ref 0–0.1)
BASOPHILS NFR BLD: 0 % (ref 0–2)
BILIRUB SERPL-MCNC: 0.5 MG/DL (ref 0.2–1)
BILIRUB UR QL: NEGATIVE
BUN SERPL-MCNC: 15 MG/DL (ref 7–18)
BUN/CREAT SERPL: 19 (ref 12–20)
CALCIUM SERPL-MCNC: 8.8 MG/DL (ref 8.5–10.1)
CHLORIDE SERPL-SCNC: 107 MMOL/L (ref 100–108)
CO2 SERPL-SCNC: 25 MMOL/L (ref 21–32)
COLOR UR: YELLOW
CREAT SERPL-MCNC: 0.81 MG/DL (ref 0.6–1.3)
DIFFERENTIAL METHOD BLD: ABNORMAL
EOSINOPHIL # BLD: 0 K/UL (ref 0–0.4)
EOSINOPHIL NFR BLD: 0 % (ref 0–5)
EPITH CASTS URNS QL MICRO: ABNORMAL /LPF (ref 0–5)
ERYTHROCYTE [DISTWIDTH] IN BLOOD BY AUTOMATED COUNT: 12.7 % (ref 11.6–14.5)
GLOBULIN SER CALC-MCNC: 3.9 G/DL (ref 2–4)
GLUCOSE SERPL-MCNC: 88 MG/DL (ref 74–99)
GLUCOSE UR STRIP.AUTO-MCNC: NEGATIVE MG/DL
HCG UR QL: NEGATIVE
HCT VFR BLD AUTO: 38.7 % (ref 35–45)
HGB BLD-MCNC: 13.3 G/DL (ref 12–16)
HGB UR QL STRIP: ABNORMAL
KETONES UR QL STRIP.AUTO: NEGATIVE MG/DL
LEUKOCYTE ESTERASE UR QL STRIP.AUTO: NEGATIVE
LIPASE SERPL-CCNC: 138 U/L (ref 73–393)
LYMPHOCYTES # BLD: 2.4 K/UL (ref 0.9–3.6)
LYMPHOCYTES NFR BLD: 21 % (ref 21–52)
MCH RBC QN AUTO: 32.7 PG (ref 24–34)
MCHC RBC AUTO-ENTMCNC: 34.4 G/DL (ref 31–37)
MCV RBC AUTO: 95.1 FL (ref 74–97)
MONOCYTES # BLD: 0.7 K/UL (ref 0.05–1.2)
MONOCYTES NFR BLD: 7 % (ref 3–10)
NEUTS SEG # BLD: 8.2 K/UL (ref 1.8–8)
NEUTS SEG NFR BLD: 72 % (ref 40–73)
NITRITE UR QL STRIP.AUTO: POSITIVE
PH UR STRIP: 6.5 [PH] (ref 5–8)
PLATELET # BLD AUTO: 238 K/UL (ref 135–420)
PMV BLD AUTO: 9.9 FL (ref 9.2–11.8)
POTASSIUM SERPL-SCNC: 4.2 MMOL/L (ref 3.5–5.5)
PROT SERPL-MCNC: 8 G/DL (ref 6.4–8.2)
PROT UR STRIP-MCNC: NEGATIVE MG/DL
RBC # BLD AUTO: 4.07 M/UL (ref 4.2–5.3)
RBC #/AREA URNS HPF: ABNORMAL /HPF (ref 0–5)
SERVICE CMNT-IMP: NORMAL
SODIUM SERPL-SCNC: 141 MMOL/L (ref 136–145)
SP GR UR REFRACTOMETRY: 1.02 (ref 1–1.03)
UROBILINOGEN UR QL STRIP.AUTO: 0.2 EU/DL (ref 0.2–1)
WBC # BLD AUTO: 11.3 K/UL (ref 4.6–13.2)
WBC URNS QL MICRO: ABNORMAL /HPF (ref 0–4)
WET PREP GENITAL: NORMAL

## 2017-10-05 PROCEDURE — 74011250637 HC RX REV CODE- 250/637: Performed by: PHYSICIAN ASSISTANT

## 2017-10-05 PROCEDURE — 87491 CHLMYD TRACH DNA AMP PROBE: CPT | Performed by: PHYSICIAN ASSISTANT

## 2017-10-05 PROCEDURE — 81001 URINALYSIS AUTO W/SCOPE: CPT | Performed by: PHYSICIAN ASSISTANT

## 2017-10-05 PROCEDURE — 74011250636 HC RX REV CODE- 250/636: Performed by: PHYSICIAN ASSISTANT

## 2017-10-05 PROCEDURE — 96374 THER/PROPH/DIAG INJ IV PUSH: CPT

## 2017-10-05 PROCEDURE — 96361 HYDRATE IV INFUSION ADD-ON: CPT

## 2017-10-05 PROCEDURE — 87086 URINE CULTURE/COLONY COUNT: CPT | Performed by: PHYSICIAN ASSISTANT

## 2017-10-05 PROCEDURE — 87186 SC STD MICRODIL/AGAR DIL: CPT | Performed by: PHYSICIAN ASSISTANT

## 2017-10-05 PROCEDURE — 87077 CULTURE AEROBIC IDENTIFY: CPT | Performed by: PHYSICIAN ASSISTANT

## 2017-10-05 PROCEDURE — 81025 URINE PREGNANCY TEST: CPT | Performed by: PHYSICIAN ASSISTANT

## 2017-10-05 PROCEDURE — 80053 COMPREHEN METABOLIC PANEL: CPT | Performed by: PHYSICIAN ASSISTANT

## 2017-10-05 PROCEDURE — 83690 ASSAY OF LIPASE: CPT | Performed by: PHYSICIAN ASSISTANT

## 2017-10-05 PROCEDURE — 99283 EMERGENCY DEPT VISIT LOW MDM: CPT

## 2017-10-05 PROCEDURE — 85025 COMPLETE CBC W/AUTO DIFF WBC: CPT | Performed by: PHYSICIAN ASSISTANT

## 2017-10-05 PROCEDURE — 87210 SMEAR WET MOUNT SALINE/INK: CPT | Performed by: PHYSICIAN ASSISTANT

## 2017-10-05 RX ORDER — METRONIDAZOLE 500 MG/1
500 TABLET ORAL 2 TIMES DAILY
Qty: 14 TAB | Refills: 0 | Status: SHIPPED | OUTPATIENT
Start: 2017-10-05 | End: 2017-10-12

## 2017-10-05 RX ORDER — CIPROFLOXACIN 500 MG/1
500 TABLET ORAL
Status: CANCELLED | OUTPATIENT
Start: 2017-10-05 | End: 2017-10-05

## 2017-10-05 RX ORDER — IBUPROFEN 600 MG/1
600 TABLET ORAL
Qty: 20 TAB | Refills: 0 | Status: SHIPPED | OUTPATIENT
Start: 2017-10-05 | End: 2018-02-24 | Stop reason: ALTCHOICE

## 2017-10-05 RX ORDER — HYDROCODONE BITARTRATE AND ACETAMINOPHEN 5; 325 MG/1; MG/1
1 TABLET ORAL
Status: COMPLETED | OUTPATIENT
Start: 2017-10-05 | End: 2017-10-05

## 2017-10-05 RX ORDER — CIPROFLOXACIN 500 MG/1
500 TABLET ORAL 2 TIMES DAILY
Qty: 14 TAB | Refills: 0 | Status: SHIPPED | OUTPATIENT
Start: 2017-10-05 | End: 2017-10-12

## 2017-10-05 RX ORDER — CIPROFLOXACIN 500 MG/1
500 TABLET ORAL
Status: COMPLETED | OUTPATIENT
Start: 2017-10-05 | End: 2017-10-05

## 2017-10-05 RX ORDER — METRONIDAZOLE 500 MG/1
500 TABLET ORAL
Status: COMPLETED | OUTPATIENT
Start: 2017-10-05 | End: 2017-10-05

## 2017-10-05 RX ORDER — SODIUM CHLORIDE 9 MG/ML
1000 INJECTION, SOLUTION INTRAVENOUS ONCE
Status: COMPLETED | OUTPATIENT
Start: 2017-10-05 | End: 2017-10-05

## 2017-10-05 RX ORDER — HYDROCODONE BITARTRATE AND ACETAMINOPHEN 5; 325 MG/1; MG/1
1 TABLET ORAL
Qty: 6 TAB | Refills: 0 | Status: SHIPPED | OUTPATIENT
Start: 2017-10-05 | End: 2017-10-05

## 2017-10-05 RX ORDER — ONDANSETRON 2 MG/ML
4 INJECTION INTRAMUSCULAR; INTRAVENOUS
Status: COMPLETED | OUTPATIENT
Start: 2017-10-05 | End: 2017-10-05

## 2017-10-05 RX ADMIN — HYDROCODONE BITARTRATE AND ACETAMINOPHEN 1 TABLET: 5; 325 TABLET ORAL at 14:39

## 2017-10-05 RX ADMIN — SODIUM CHLORIDE 1000 ML: 900 INJECTION, SOLUTION INTRAVENOUS at 13:04

## 2017-10-05 RX ADMIN — METRONIDAZOLE 500 MG: 500 TABLET ORAL at 14:39

## 2017-10-05 RX ADMIN — CIPROFLOXACIN HYDROCHLORIDE 500 MG: 500 TABLET, FILM COATED ORAL at 14:39

## 2017-10-05 RX ADMIN — ONDANSETRON 4 MG: 2 INJECTION INTRAMUSCULAR; INTRAVENOUS at 13:04

## 2017-10-05 NOTE — ED PROVIDER NOTES
HPI Comments: 33 yr old female presents to the ED complaining of R flank/mid back pain, lower abdominal pain, N/V, and vaginal discharge over the past few days. Pt denies tx PTA. Denies painful urination but reports increased urinary frequency. No other complaints. Patient is a 33 y.o. female presenting with back pain and vomiting. Back Pain    Associated symptoms include abdominal pain. Pertinent negatives include no chest pain, no fever, no headaches and no dysuria. Vomiting    Associated symptoms include abdominal pain. Pertinent negatives include no chills, no fever, no diarrhea, no headaches, no myalgias, no cough and no headaches. Past Medical History:   Diagnosis Date    Bipolar 1 disorder, depressed ([facility] [address]     Neurological disorder     Psychiatric disorder        Past Surgical History:   Procedure Laterality Date    UPPER GI ENDOSCOPY,BIOPSY  3/29/2017              No family history on file. Social History     Social History    Marital status: SINGLE     Spouse name: N/A    Number of children: N/A    Years of education: N/A     Occupational History    Not on file. Social History Main Topics    Smoking status: Never Smoker    Smokeless tobacco: Not on file    Alcohol use 0.5 oz/week     1 drink(s) per week    Drug use: No    Sexual activity: Yes     Partners: Male     Birth control/ protection: None     Other Topics Concern    Not on file     Social History Narrative         ALLERGIES: Latex, natural rubber and Doxycycline    Review of Systems   Constitutional: Negative. Negative for chills, diaphoresis, fatigue and fever. HENT: Negative. Negative for congestion, ear pain, rhinorrhea and sore throat. Eyes: Negative. Negative for pain and redness. Respiratory: Negative. Negative for cough, shortness of breath, wheezing and stridor. Cardiovascular: Negative. Negative for chest pain, palpitations and leg swelling.    Gastrointestinal: Positive for abdominal pain, nausea and vomiting. Negative for constipation and diarrhea. Endocrine: Negative. Genitourinary: Positive for flank pain, frequency and vaginal discharge. Negative for dysuria and hematuria. Musculoskeletal: Positive for back pain. Negative for myalgias, neck pain and neck stiffness. Skin: Negative. Negative for rash and wound. Allergic/Immunologic: Negative. Neurological: Negative. Negative for dizziness, seizures, syncope and headaches. Hematological: Negative. Psychiatric/Behavioral: Negative. All other systems reviewed and are negative. Vitals:    10/05/17 1217   BP: 123/63   Pulse: 71   Resp: 20   Temp: 97.4 °F (36.3 °C)   SpO2: 98%   Weight: 45.8 kg (101 lb)   Height: 4' 8\" (1.422 m)            Physical Exam   Constitutional: She is oriented to person, place, and time. She appears well-developed and well-nourished. She appears distressed. HENT:   Head: Normocephalic. Neck: Normal range of motion. Neck supple. Cardiovascular: Normal rate, regular rhythm and normal heart sounds. Exam reveals no gallop and no friction rub. No murmur heard. Pulmonary/Chest: Effort normal and breath sounds normal. No stridor. No respiratory distress. She has no wheezes. She has no rales. Abdominal: Soft. Bowel sounds are normal. She exhibits no distension and no mass. There is tenderness. There is no rebound and no guarding. Bilateral lower abdominal TTP noted without guarding, R CVA TTP noted   Genitourinary: Vaginal discharge found. Genitourinary Comments: Moderate amount of yellow frothy discharge noted on pelvic exam, no cervical abnormalities noted, mild bilateral adnexal tenderness present, no palpable masses. Musculoskeletal: Normal range of motion. Neurological: She is alert and oriented to person, place, and time. Coordination normal.   Gait is steady. Able to ambulate without difficulty. Skin: Skin is warm and dry. No rash noted. She is not diaphoretic. No erythema. Psychiatric: She has a normal mood and affect. Her behavior is normal. Thought content normal.   Nursing note and vitals reviewed. MDM  Number of Diagnoses or Management Options  Abdominal pain, lower:   BV (bacterial vaginosis):   Flank pain:   Urinary tract infection with hematuria, site unspecified:   Diagnosis management comments: Impression:  Flank pain, lower abdominal pain, BV, UTI    IV inserted 1 L saline and 4 mg zofran given   RBC 4.07, ABG 8.2, , lipase unremarkable  UA positive nitrates, trace blood, urine sent for culture  Wet prep: BV     norco cipro and flagyl given in the ED     Patient is stable for discharge at this time. Rx for cipro, flagyl, and motrin given. Rest and follow-up with PCP this week. Return to the ED immediately for any new or worsening sx.   Elvira Cook PA-C 2:21 PM        Amount and/or Complexity of Data Reviewed  Clinical lab tests: ordered and reviewed    Risk of Complications, Morbidity, and/or Mortality  Presenting problems: moderate  Diagnostic procedures: moderate  Management options: moderate    Patient Progress  Patient progress: stable    ED Course       Procedures

## 2017-10-05 NOTE — DISCHARGE INSTRUCTIONS
Bacterial Vaginosis: Care Instructions  Your Care Instructions    Bacterial vaginosis is a type of vaginal infection. It is caused by excess growth of certain bacteria that are normally found in the vagina. Symptoms can include itching, swelling, pain when you urinate or have sex, and a gray or yellow discharge with a \"fishy\" odor. It is not considered an infection that is spread through sexual contact. Although symptoms can be annoying and uncomfortable, bacterial vaginosis does not usually cause other health problems. However, if you have it while you are pregnant, it can cause complications. While the infection may go away on its own, most doctors use antibiotics to treat it. You may have been prescribed pills or vaginal cream. With treatment, bacterial vaginosis usually clears up in 5 to 7 days. Follow-up care is a key part of your treatment and safety. Be sure to make and go to all appointments, and call your doctor if you are having problems. It's also a good idea to know your test results and keep a list of the medicines you take. How can you care for yourself at home? · Take your antibiotics as directed. Do not stop taking them just because you feel better. You need to take the full course of antibiotics. · Do not eat or drink anything that contains alcohol if you are taking metronidazole (Flagyl). · Keep using your medicine if you start your period. Use pads instead of tampons while using a vaginal cream or suppository. Tampons can absorb the medicine. · Wear loose cotton clothing. Do not wear nylon and other materials that hold body heat and moisture close to the skin. · Do not scratch. Relieve itching with a cold pack or a cool bath. · Do not wash your vaginal area more than once a day. Use plain water or a mild, unscented soap. Do not douche. When should you call for help?   Watch closely for changes in your health, and be sure to contact your doctor if:  · You have unexpected vaginal bleeding. · You have a fever. · You have new or increased pain in your vagina or pelvis. · You are not getting better after 1 week. · Your symptoms return after you finish the course of your medicine. Where can you learn more? Go to http://gerardo-stan.info/. Tammi Michaud in the search box to learn more about \"Bacterial Vaginosis: Care Instructions. \"  Current as of: October 13, 2016  Content Version: 11.3  © 8246-0265 [employer]. Care instructions adapted under license by [employer] (which disclaims liability or warranty for this information). If you have questions about a medical condition or this instruction, always ask your healthcare professional. [address] any warranty or liability for your use of this information. Abdominal Pain: Care Instructions  Your Care Instructions    Abdominal pain has many possible causes. Some aren't serious and get better on their own in a few days. Others need more testing and treatment. If your pain continues or gets worse, you need to be rechecked and may need more tests to find out what is wrong. You may need surgery to correct the problem. Don't ignore new symptoms, such as fever, nausea and vomiting, urination problems, pain that gets worse, and dizziness. These may be signs of a more serious problem. Your doctor may have recommended a follow-up visit in the next 8 to 12 hours. If you are not getting better, you may need more tests or treatment. The doctor has checked you carefully, but problems can develop later. If you notice any problems or new symptoms, get medical treatment right away. Follow-up care is a key part of your treatment and safety. Be sure to make and go to all appointments, and call your doctor if you are having problems. It's also a good idea to know your test results and keep a list of the medicines you take. How can you care for yourself at home?   · Rest until you feel better. · To prevent dehydration, drink plenty of fluids, enough so that your urine is light yellow or clear like water. Choose water and other caffeine-free clear liquids until you feel better. If you have kidney, heart, or liver disease and have to limit fluids, talk with your doctor before you increase the amount of fluids you drink. · If your stomach is upset, eat mild foods, such as rice, dry toast or crackers, bananas, and applesauce. Try eating several small meals instead of two or three large ones. · Wait until 48 hours after all symptoms have gone away before you have spicy foods, alcohol, and drinks that contain caffeine. · Do not eat foods that are high in fat. · Avoid anti-inflammatory medicines such as aspirin, ibuprofen (Advil, Motrin), and naproxen (Aleve). These can cause stomach upset. Talk to your doctor if you take daily aspirin for another health problem. When should you call for help? Call 911 anytime you think you may need emergency care. For example, call if:  · You passed out (lost consciousness). · You pass maroon or very bloody stools. · You vomit blood or what looks like coffee grounds. · You have new, severe belly pain. Call your doctor now or seek immediate medical care if:  · Your pain gets worse, especially if it becomes focused in one area of your belly. · You have a new or higher fever. · Your stools are black and look like tar, or they have streaks of blood. · You have unexpected vaginal bleeding. · You have symptoms of a urinary tract infection. These may include:  ¨ Pain when you urinate. ¨ Urinating more often than usual.  ¨ Blood in your urine. · You are dizzy or lightheaded, or you feel like you may faint. Watch closely for changes in your health, and be sure to contact your doctor if:  · You are not getting better after 1 day (24 hours). Where can you learn more? Go to http://gerardo-stan.info/.   Enter S978 in the search box to learn more about \"Abdominal Pain: Care Instructions. \"  Current as of: 2017  Content Version: 11.3  © 5862-5508 [employer]. Care instructions adapted under license by [employer] (which disclaims liability or warranty for this information). If you have questions about a medical condition or this instruction, always ask your healthcare professional. [address] any warranty or liability for your use of this information. Swapsee Activation    Thank you for requesting access to Swapsee. Please follow the instructions below to securely access and download your online medical record. Swapsee allows you to send messages to your doctor, view your test results, renew your prescriptions, schedule appointments, and more. How Do I Sign Up? 1. In your internet browser, go to [email or web address]  2. Click on the First Time User? Click Here link in the Sign In box. You will be redirect to the New Member Sign Up page. 3. Enter your Swapsee Access Code exactly as it appears below. You will not need to use this code after youve completed the sign-up process. If you do not sign up before the expiration date, you must request a new code. Swapsee Access Code: Activation code not generated  Current Swapsee Status: Patient Declined (This is the date your Swapsee access code will )    4. Enter the last four digits of your Social Security Number (xxxx) and Date of Birth (mm/dd/yyyy) as indicated and click Submit. You will be taken to the next sign-up page. 5. Create a Swapsee ID. This will be your Swapsee login ID and cannot be changed, so think of one that is secure and easy to remember. 6. Create a Swapsee password. You can change your password at any time. 7. Enter your Password Reset Question and Answer. This can be used at a later time if you forget your password. 8. Enter your e-mail address.  You will receive e-mail notification when new information is available in [address] 9. Click Sign Up. You can now view and download portions of your medical record. 10. Click the Download Summary menu link to download a portable copy of your medical information. Additional Information    If you have questions, please visit the Frequently Asked Questions section of the [employer] website at [email or web address] Remember, [employer] is NOT to be used for urgent needs. For medical emergencies, dial 911.

## 2017-10-05 NOTE — ED NOTES
PT discharged per provider order, educated patient on discharge instructions, medications and follow up care, verbalized understanding, ambulated out of ED without complications

## 2017-10-07 LAB
BACTERIA SPEC CULT: ABNORMAL
SERVICE CMNT-IMP: ABNORMAL

## 2017-11-21 ENCOUNTER — HOSPITAL ENCOUNTER (EMERGENCY)
Age: 27
Discharge: HOME | End: 2017-11-21
Attending: EMERGENCY MEDICINE | Admitting: EMERGENCY MEDICINE
Payer: MEDICAID

## 2017-11-21 VITALS
OXYGEN SATURATION: 100 % | RESPIRATION RATE: 12 BRPM | TEMPERATURE: 97 F | SYSTOLIC BLOOD PRESSURE: 105 MMHG | HEART RATE: 56 BPM | DIASTOLIC BLOOD PRESSURE: 60 MMHG

## 2017-11-21 DIAGNOSIS — B96.89 BV (BACTERIAL VAGINOSIS): ICD-10-CM

## 2017-11-21 DIAGNOSIS — N76.0 BV (BACTERIAL VAGINOSIS): ICD-10-CM

## 2017-11-21 DIAGNOSIS — N93.9 ABNORMAL UTERINE BLEEDING: Primary | ICD-10-CM

## 2017-11-21 LAB
ALBUMIN SERPL-MCNC: 3.8 G/DL (ref 3.4–5)
ALBUMIN/GLOB SERPL: 1.1 {RATIO} (ref 0.8–1.7)
ALP SERPL-CCNC: 136 U/L (ref 45–117)
ALT SERPL-CCNC: 28 U/L (ref 13–56)
ANION GAP SERPL CALC-SCNC: 8 MMOL/L (ref 3–18)
APPEARANCE UR: NORMAL
AST SERPL-CCNC: 16 U/L (ref 15–37)
BACTERIA URNS QL MICRO: ABNORMAL /HPF
BASOPHILS # BLD: 0 K/UL (ref 0–0.06)
BASOPHILS NFR BLD: 0 % (ref 0–2)
BILIRUB DIRECT SERPL-MCNC: 0.1 MG/DL (ref 0–0.2)
BILIRUB SERPL-MCNC: 0.3 MG/DL (ref 0.2–1)
BILIRUB UR QL: NEGATIVE
BUN SERPL-MCNC: 16 MG/DL (ref 7–18)
BUN/CREAT SERPL: 19 (ref 12–20)
CALCIUM SERPL-MCNC: 8.5 MG/DL (ref 8.5–10.1)
CHLORIDE SERPL-SCNC: 105 MMOL/L (ref 100–108)
CO2 SERPL-SCNC: 24 MMOL/L (ref 21–32)
COLOR UR: NORMAL
CREAT SERPL-MCNC: 0.86 MG/DL (ref 0.6–1.3)
DIFFERENTIAL METHOD BLD: ABNORMAL
EOSINOPHIL # BLD: 0.1 K/UL (ref 0–0.4)
EOSINOPHIL NFR BLD: 1 % (ref 0–5)
EPITH CASTS URNS QL MICRO: ABNORMAL /LPF (ref 0–5)
ERYTHROCYTE [DISTWIDTH] IN BLOOD BY AUTOMATED COUNT: 12.4 % (ref 11.6–14.5)
GLOBULIN SER CALC-MCNC: 3.6 G/DL (ref 2–4)
GLUCOSE SERPL-MCNC: 93 MG/DL (ref 74–99)
GLUCOSE UR STRIP.AUTO-MCNC: NEGATIVE MG/DL
HCG UR QL: NEGATIVE
HCT VFR BLD AUTO: 40.4 % (ref 35–45)
HGB BLD-MCNC: 13.8 G/DL (ref 12–16)
HGB UR QL STRIP: NORMAL
KETONES UR QL STRIP.AUTO: NEGATIVE MG/DL
LEUKOCYTE ESTERASE UR QL STRIP.AUTO: NEGATIVE
LYMPHOCYTES # BLD: 2.1 K/UL (ref 0.9–3.6)
LYMPHOCYTES NFR BLD: 22 % (ref 21–52)
MCH RBC QN AUTO: 33.4 PG (ref 24–34)
MCHC RBC AUTO-ENTMCNC: 34.2 G/DL (ref 31–37)
MCV RBC AUTO: 97.8 FL (ref 74–97)
MONOCYTES # BLD: 0.8 K/UL (ref 0.05–1.2)
MONOCYTES NFR BLD: 8 % (ref 3–10)
NEUTS SEG # BLD: 6.7 K/UL (ref 1.8–8)
NEUTS SEG NFR BLD: 69 % (ref 40–73)
NITRITE UR QL STRIP.AUTO: NEGATIVE
PH UR STRIP: 6.5 [PH]
PLATELET # BLD AUTO: 185 K/UL (ref 135–420)
PMV BLD AUTO: 10.1 FL (ref 9.2–11.8)
POTASSIUM SERPL-SCNC: 4.2 MMOL/L (ref 3.5–5.5)
PROT SERPL-MCNC: 7.4 G/DL (ref 6.4–8.2)
PROT UR STRIP-MCNC: 100 MG/DL
RBC # BLD AUTO: 4.13 M/UL (ref 4.2–5.3)
RBC #/AREA URNS HPF: ABNORMAL /HPF (ref 0–5)
SERVICE CMNT-IMP: NORMAL
SODIUM SERPL-SCNC: 137 MMOL/L (ref 136–145)
SP GR UR REFRACTOMETRY: 1.02 (ref 1–1.04)
UROBILINOGEN UR QL STRIP.AUTO: 0.2 EU/DL
WBC # BLD AUTO: 9.6 K/UL (ref 4.6–13.2)
WBC URNS QL MICRO: ABNORMAL /HPF (ref 0–5)
WET PREP GENITAL: NORMAL

## 2017-11-21 PROCEDURE — 85025 COMPLETE CBC W/AUTO DIFF WBC: CPT | Performed by: PHYSICIAN ASSISTANT

## 2017-11-21 PROCEDURE — 87086 URINE CULTURE/COLONY COUNT: CPT | Performed by: PHYSICIAN ASSISTANT

## 2017-11-21 PROCEDURE — 96374 THER/PROPH/DIAG INJ IV PUSH: CPT

## 2017-11-21 PROCEDURE — 99284 EMERGENCY DEPT VISIT MOD MDM: CPT

## 2017-11-21 PROCEDURE — 81001 URINALYSIS AUTO W/SCOPE: CPT | Performed by: PHYSICIAN ASSISTANT

## 2017-11-21 PROCEDURE — 87491 CHLMYD TRACH DNA AMP PROBE: CPT | Performed by: PHYSICIAN ASSISTANT

## 2017-11-21 PROCEDURE — 96375 TX/PRO/DX INJ NEW DRUG ADDON: CPT

## 2017-11-21 PROCEDURE — 74011250636 HC RX REV CODE- 250/636: Performed by: PHYSICIAN ASSISTANT

## 2017-11-21 PROCEDURE — 81025 URINE PREGNANCY TEST: CPT | Performed by: PHYSICIAN ASSISTANT

## 2017-11-21 PROCEDURE — 80076 HEPATIC FUNCTION PANEL: CPT | Performed by: PHYSICIAN ASSISTANT

## 2017-11-21 PROCEDURE — 80048 BASIC METABOLIC PNL TOTAL CA: CPT | Performed by: PHYSICIAN ASSISTANT

## 2017-11-21 PROCEDURE — 87210 SMEAR WET MOUNT SALINE/INK: CPT | Performed by: PHYSICIAN ASSISTANT

## 2017-11-21 RX ORDER — KETOROLAC TROMETHAMINE 30 MG/ML
30 INJECTION, SOLUTION INTRAMUSCULAR; INTRAVENOUS
Status: COMPLETED | OUTPATIENT
Start: 2017-11-21 | End: 2017-11-21

## 2017-11-21 RX ORDER — ONDANSETRON 2 MG/ML
4 INJECTION INTRAMUSCULAR; INTRAVENOUS
Status: COMPLETED | OUTPATIENT
Start: 2017-11-21 | End: 2017-11-21

## 2017-11-21 RX ORDER — METRONIDAZOLE 500 MG/1
500 TABLET ORAL 2 TIMES DAILY
Qty: 14 TAB | Refills: 0 | Status: SHIPPED | OUTPATIENT
Start: 2017-11-21 | End: 2017-11-28

## 2017-11-21 RX ORDER — OMEPRAZOLE 20 MG/1
20 CAPSULE, DELAYED RELEASE ORAL DAILY
Qty: 14 CAP | Refills: 0 | Status: SHIPPED | OUTPATIENT
Start: 2017-11-21 | End: 2017-12-05

## 2017-11-21 RX ORDER — IBUPROFEN 600 MG/1
600 TABLET ORAL
Qty: 20 TAB | Refills: 0 | Status: SHIPPED | OUTPATIENT
Start: 2017-11-21 | End: 2018-02-24 | Stop reason: ALTCHOICE

## 2017-11-21 RX ORDER — ONDANSETRON 4 MG/1
TABLET, ORALLY DISINTEGRATING ORAL
Qty: 10 TAB | Refills: 0 | Status: SHIPPED | OUTPATIENT
Start: 2017-11-21 | End: 2018-04-05

## 2017-11-21 RX ADMIN — ONDANSETRON 4 MG: 2 INJECTION INTRAMUSCULAR; INTRAVENOUS at 10:09

## 2017-11-21 RX ADMIN — KETOROLAC TROMETHAMINE 30 MG: 30 INJECTION, SOLUTION INTRAMUSCULAR at 10:09

## 2017-11-21 NOTE — DISCHARGE INSTRUCTIONS
Abnormal Uterine Bleeding: Care Instructions  Your Care Instructions    Abnormal uterine bleeding (AUB) is irregular bleeding from the uterus that is longer or heavier than usual or does not occur at your regular time. Sometimes it is caused by changes in hormone levels. It can also be caused by growths in the uterus, such as fibroids or polyps. Sometimes a cause cannot be found. You may have heavy bleeding when you are not expecting your period. Your doctor may suggest a pregnancy test, if you think you are pregnant. Follow-up care is a key part of your treatment and safety. Be sure to make and go to all appointments, and call your doctor if you are having problems. It's also a good idea to know your test results and keep a list of the medicines you take. How can you care for yourself at home? · Be safe with medicines. Take pain medicines exactly as directed. ¨ If the doctor gave you a prescription medicine for pain, take it as prescribed. ¨ If you are not taking a prescription pain medicine, ask your doctor if you can take an over-the-counter medicine. · You may be low in iron because of blood loss. Eat a balanced diet that is high in iron and vitamin C. Foods rich in iron include red meat, shellfish, eggs, beans, and leafy green vegetables. Talk to your doctor about whether you need to take iron pills or a multivitamin. When should you call for help? Call 911 anytime you think you may need emergency care. For example, call if:  ? · You passed out (lost consciousness). ?Call your doctor now or seek immediate medical care if:  ? · You have new or worse belly or pelvic pain. ? · You have severe vaginal bleeding. ? · You feel dizzy or lightheaded, or you feel like you may faint. ? Watch closely for changes in your health, and be sure to contact your doctor if:  ? · You think you may be pregnant. ? · Your bleeding gets worse. ? · You do not get better as expected.    Where can you learn more?  Go to http://gerardo-stan.info/. Enter Z626 in the search box to learn more about \"Abnormal Uterine Bleeding: Care Instructions. \"  Current as of: October 13, 2016  Content Version: 11.4  © 5330-1920 [employer]. Care instructions adapted under license by [employer] (which disclaims liability or warranty for this information). If you have questions about a medical condition or this instruction, always ask your healthcare professional. [address] any warranty or liability for your use of this information. Bacterial Vaginosis: Care Instructions  Your Care Instructions    Bacterial vaginosis is a type of vaginal infection. It is caused by excess growth of certain bacteria that are normally found in the vagina. Symptoms can include itching, swelling, pain when you urinate or have sex, and a gray or yellow discharge with a \"fishy\" odor. It is not considered an infection that is spread through sexual contact. Although symptoms can be annoying and uncomfortable, bacterial vaginosis does not usually cause other health problems. However, if you have it while you are pregnant, it can cause complications. While the infection may go away on its own, most doctors use antibiotics to treat it. You may have been prescribed pills or vaginal cream. With treatment, bacterial vaginosis usually clears up in 5 to 7 days. Follow-up care is a key part of your treatment and safety. Be sure to make and go to all appointments, and call your doctor if you are having problems. It's also a good idea to know your test results and keep a list of the medicines you take. How can you care for yourself at home? · Take your antibiotics as directed. Do not stop taking them just because you feel better. You need to take the full course of antibiotics. · Do not eat or drink anything that contains alcohol if you are taking metronidazole (Flagyl).   · Keep using your medicine if you start your period. Use pads instead of tampons while using a vaginal cream or suppository. Tampons can absorb the medicine. · Wear loose cotton clothing. Do not wear nylon and other materials that hold body heat and moisture close to the skin. · Do not scratch. Relieve itching with a cold pack or a cool bath. · Do not wash your vaginal area more than once a day. Use plain water or a mild, unscented soap. Do not douche. When should you call for help? Watch closely for changes in your health, and be sure to contact your doctor if:  ? · You have unexpected vaginal bleeding. ? · You have a fever. ? · You have new or increased pain in your vagina or pelvis. ? · You are not getting better after 1 week. ? · Your symptoms return after you finish the course of your medicine. Where can you learn more? Go to http://gerardo-stan.info/. [place] in the search box to learn more about \"Bacterial Vaginosis: Care Instructions. \"  Current as of: October 13, 2016  Content Version: 11.4  © 5691-8815 [employer]. Care instructions adapted under license by [employer] (which disclaims liability or warranty for this information). If you have questions about a medical condition or this instruction, always ask your healthcare professional. [place] [address] any warranty or liability for your use of this information.

## 2017-11-21 NOTE — ED PROVIDER NOTES
HPI Comments: Jeanne Florez is a 33 y.o. female with a pertinent history of bipolar disorder who presents to the emergency department for evaluation of vaginal bleeding x 2 months with pelvic pain. She relates she began having some vomiting a few days ago, but reports it is \"not that much. \"  Lower abdominal pain radiates to the lower back. Bleeding has become heavier over the past 2 days. She states she has had this problem before and used to be on Depo shot. She last received this 6 months ago. Pt denies any fevers or chills, headache, dizziness or light headedness, ENT issues, CP or discomfort, SOB, cough, d/c, melena/hematochezia, dysuria, hematuria, frequency, focal weakness/numbness/tingling, or rash. Patient has no other complaints at this time. PCP:  Hugh Mueller MD           Past Medical History:   Diagnosis Date    Bipolar 1 disorder, depressed ([facility] [address]     Neurological disorder     Psychiatric disorder        Past Surgical History:   Procedure Laterality Date    UPPER GI ENDOSCOPY,BIOPSY  3/29/2017              No family history on file. Social History     Social History    Marital status: SINGLE     Spouse name: N/A    Number of children: N/A    Years of education: N/A     Occupational History    Not on file. Social History Main Topics    Smoking status: Never Smoker    Smokeless tobacco: Not on file    Alcohol use 0.5 oz/week     1 drink(s) per week    Drug use: No    Sexual activity: Yes     Partners: Male     Birth control/ protection: None     Other Topics Concern    Not on file     Social History Narrative         ALLERGIES: Latex, natural rubber and Doxycycline    Review of Systems   Constitutional: Negative for chills and fever. HENT: Negative for congestion, rhinorrhea and sore throat. Respiratory: Negative for cough and shortness of breath. Cardiovascular: Negative for chest pain. Gastrointestinal: Positive for nausea and vomiting.  Negative for constipation and diarrhea. Genitourinary: Positive for pelvic pain and vaginal bleeding. Negative for dysuria, frequency, hematuria and vaginal discharge. Musculoskeletal: Negative for back pain and myalgias. Skin: Negative for rash and wound. Neurological: Negative for dizziness and headaches. Vitals:    11/21/17 0929   BP: 105/60   Pulse: (!) 56   Resp: 12   Temp: 97 °F (36.1 °C)   SpO2: 100%            Physical Exam   Constitutional: She is oriented to person, place, and time. She appears well-developed and well-nourished. No distress. HENT:   Head: Normocephalic and atraumatic. Eyes: Conjunctivae are normal.   Neck: Normal range of motion. Neck supple. Cardiovascular: Normal rate, regular rhythm and normal heart sounds. Pulmonary/Chest: Effort normal and breath sounds normal. No respiratory distress. She has no wheezes. She has no rales. She exhibits no tenderness. Abdominal: Soft. Bowel sounds are normal. She exhibits no distension. There is tenderness. There is no rebound and no guarding. Tenderness to deep palpation noted to suprapubic region. No rebound, rigidity, guarding, distention, or mass noted. (-) Landry's sign. (-) Rovsing's sign. Normoactive BS all four quadrants. Genitourinary:   Genitourinary Comments: Pelvic Exam:    Speculum and bimanual pelvic exam performed at bedside with chaperone assistance of OMER Camp. Normal exam of external labia majora and minora without apparent rash or lesions. Normal exam of vagina, cervix, uterus size and bilateral ovaries. Vaginal discharge was bloody. No adnexal masses appreciated. Patient tolerated procedure well. No CMT. Samples obtained for GC/Chlamydia and wet prep, labeled at bedside and sent to lab.  9:24 AM     Musculoskeletal: She exhibits no edema or deformity. Neurological: She is alert and oriented to person, place, and time. She has normal reflexes. Skin: Skin is warm and dry. She is not diaphoretic. Psychiatric: She has a normal mood and affect. Nursing note and vitals reviewed. MDM  Number of Diagnoses or Management Options  Abnormal uterine bleeding: new and requires workup  BV (bacterial vaginosis): new and requires workup  Diagnosis management comments: Differential Diagnosis:  Menses, anovulation/DUB, spontaneous , threatened , pregnancy, uterine leiomyomas, cervical or endometrial polyps, pelvic tumors, atrophic endometrium, systemic disorders, hypothyroidism, pelvic infections, exogenous hormone use, coagulopathy, trauma    Plan:  Pt presents in NAD, vitals wnl, well-hydrated, non-toxic in appearance. Benign exam of abdomen. No leukocytosis. Hcg negative. Hgb 13.8.  UA appears contaminated, not infected. Pt denies urinary symptoms. Will send off culture and treat as necessary. Wet prep with clue cells. BMP wnl. Hepatic panel wnl. Given toradol and zofran here. Will DC home with motrin, zofran, flagyl. Prilosec as patient reports history of ulcers. At this time, patient is stable and appropriate for discharge home. Patient demonstrates understanding of current diagnoses and is in agreement with the treatment plan. They are advised that while the likelihood of serious underlying condition is low at this point given the evaluation performed today, we cannot fully rule it out. They are advised to immediately return with any new symptoms or worsening of current condition. All questions have been answered. Patient is given educational material regarding their diagnoses, including danger symptoms and when to return to the ED. Follow-up with PCP. She is advised that she will need to call around to find a new OB/GYN who accepts her insurance, but she may follow-up with PCP in the meantime for Depo shot.              Amount and/or Complexity of Data Reviewed  Clinical lab tests: ordered and reviewed  Review and summarize past medical records: yes    Risk of Complications, Morbidity, and/or Mortality  Presenting problems: moderate  Diagnostic procedures: moderate  Management options: moderate    Patient Progress  Patient progress: improved    ED Course       Procedures      -------------------------------------------------------------------------------------------------------------------  Orders:  Orders Placed This Encounter    WET PREP     Standing Status:   Standing     Number of Occurrences:   1    CULTURE, URINE     Standing Status:   Standing     Number of Occurrences:   1     Order Specific Question:   Reason for Culture     Answer:   Pelvic pain    CHLAMYDIA/NEISSERIA AMPLIFICATION     Standing Status:   Standing     Number of Occurrences:   1     Order Specific Question:   Specimen type/source     Answer:   Endocervical [[id number]]    CBC WITH AUTOMATED DIFF     Standing Status:   Standing     Number of Occurrences:   1    METABOLIC PANEL, BASIC     Standing Status:   Standing     Number of Occurrences:   1    HEPATIC FUNCTION PANEL     Standing Status:   Standing     Number of Occurrences:   1    URINALYSIS W/ RFLX MICROSCOPIC     Standing Status:   Standing     Number of Occurrences:   1    HCG URINE, QL     Standing Status:   Standing     Number of Occurrences:   1    URINE MICROSCOPIC ONLY     Standing Status:   Standing     Number of Occurrences:   1    SALINE LOCK IV ONE TIME STAT     Standing Status:   Standing     Number of Occurrences:   1    ketorolac (TORADOL) injection 30 mg    ondansetron (ZOFRAN) injection 4 mg    ibuprofen (MOTRIN) 600 mg tablet     Sig: Take 1 Tab by mouth every eight (8) hours as needed for Pain. Dispense:  20 Tab     Refill:  0    omeprazole (PRILOSEC) 20 mg capsule     Sig: Take 1 Cap by mouth daily for 14 days. Dispense:  14 Cap     Refill:  0    ondansetron (ZOFRAN ODT) 4 mg disintegrating tablet     Sig: Take 1-2 tablets every 6-8 hours as needed for nausea and vomiting.      Dispense:  10 Tab     Refill: 0    metroNIDAZOLE (FLAGYL) 500 mg tablet     Sig: Take 1 Tab by mouth two (2) times a day for 7 days. Dispense:  14 Tab     Refill:  0        Lab Results:   Recent Results (from the past 12 hour(s))   WET PREP    Collection Time: 11/21/17  9:03 AM   Result Value Ref Range    Special Requests: NO SPECIAL REQUESTS      Wet prep FEW  CLUE CELLS PRESENT        Wet prep NO TRICHOMONAS SEEN      Wet prep NO YEAST SEEN     URINALYSIS W/ RFLX MICROSCOPIC    Collection Time: 11/21/17  9:16 AM   Result Value Ref Range    Color RED      Appearance BLOODY      Specific gravity 1.025 1.003 - 1.040      pH (UA) 6.5      Protein 100 mg/dL    Glucose NEGATIVE  mg/dL    Ketone NEGATIVE  mg/dL    Bilirubin NEGATIVE       Blood LARGE      Urobilinogen 0.2 EU/dL    Nitrites NEGATIVE       Leukocyte Esterase NEGATIVE      HCG URINE, QL    Collection Time: 11/21/17  9:16 AM   Result Value Ref Range    HCG urine, Ql. NEGATIVE  NEG     URINE MICROSCOPIC ONLY    Collection Time: 11/21/17  9:16 AM   Result Value Ref Range    WBC 0 to 2 0 - 5 /hpf    RBC TOO NUMEROUS TO COUNT 0 - 5 /hpf    Epithelial cells 2+ 0 - 5 /lpf    Bacteria FEW (A) NEG /hpf   CBC WITH AUTOMATED DIFF    Collection Time: 11/21/17 10:13 AM   Result Value Ref Range    WBC 9.6 4.6 - 13.2 K/uL    RBC 4.13 (L) 4.20 - 5.30 M/uL    HGB 13.8 12.0 - 16.0 g/dL    HCT 40.4 35.0 - 45.0 %    MCV 97.8 (H) 74.0 - 97.0 FL    MCH 33.4 24.0 - 34.0 PG    MCHC 34.2 31.0 - 37.0 g/dL    RDW 12.4 11.6 - 14.5 %    PLATELET 953 981 - 960 K/uL    MPV 10.1 9.2 - 11.8 FL    NEUTROPHILS 69 40 - 73 %    LYMPHOCYTES 22 21 - 52 %    MONOCYTES 8 3 - 10 %    EOSINOPHILS 1 0 - 5 %    BASOPHILS 0 0 - 2 %    ABS. NEUTROPHILS 6.7 1.8 - 8.0 K/UL    ABS. LYMPHOCYTES 2.1 0.9 - 3.6 K/UL    ABS. MONOCYTES 0.8 0.05 - 1.2 K/UL    ABS. EOSINOPHILS 0.1 0.0 - 0.4 K/UL    ABS.  BASOPHILS 0.0 0.0 - 0.06 K/UL    DF AUTOMATED     METABOLIC PANEL, BASIC    Collection Time: 11/21/17 10:13 AM   Result Value Ref Range Sodium 137 136 - 145 mmol/L    Potassium 4.2 3.5 - 5.5 mmol/L    Chloride 105 100 - 108 mmol/L    CO2 24 21 - 32 mmol/L    Anion gap 8 3.0 - 18 mmol/L    Glucose 93 74 - 99 mg/dL    BUN 16 7.0 - 18 MG/DL    Creatinine 0.86 0.6 - 1.3 MG/DL    BUN/Creatinine ratio 19 12 - 20      GFR est AA >60 >60 ml/min/1.73m2    GFR est non-AA >60 >60 ml/min/1.73m2    Calcium 8.5 8.5 - 10.1 MG/DL   HEPATIC FUNCTION PANEL    Collection Time: 11/21/17 10:13 AM   Result Value Ref Range    Protein, total 7.4 6.4 - 8.2 g/dL    Albumin 3.8 3.4 - 5.0 g/dL    Globulin 3.6 2.0 - 4.0 g/dL    A-G Ratio 1.1 0.8 - 1.7      Bilirubin, total 0.3 0.2 - 1.0 MG/DL    Bilirubin, direct 0.1 0.0 - 0.2 MG/DL    Alk. phosphatase 136 (H) 45 - 117 U/L    AST (SGOT) 16 15 - 37 U/L    ALT (SGPT) 28 13 - 56 U/L       Radiology Results:  No results found. Progress Notes:  9:15 AM:  Davy Mendez PA-C was at the pt's bedside, assessed the pt and answered the pt's questions regarding treatment.    -------------------------------------------------------------------------------------------------------------------    Disposition:  Diagnosis:   1. Abnormal uterine bleeding    2. BV (bacterial vaginosis)        Disposition: [place] Home    Follow-up Information     Follow up With Details Comments [address], [place] Call in 1 day For follow-up 0460 [facility] [address] [facility] EM[facility] Go to As needed, If symptoms worsen [address] [facility] [id number]  [phone]          Patient's Medications   Start Taking    IBUPROFEN (MOTRIN) 600 MG TABLET    Take 1 Tab by mouth every eight (8) hours as needed for Pain. METRONIDAZOLE (FLAGYL) 500 MG TABLET    Take 1 Tab by mouth two (2) times a day for 7 days. OMEPRAZOLE (PRILOSEC) 20 MG CAPSULE    Take 1 Cap by mouth daily for 14 days.     ONDANSETRON (ZOFRAN ODT) 4 MG DISINTEGRATING TABLET    Take 1-2 tablets every 6-8 hours as needed for nausea and vomiting. Continue Taking    BISACODYL (DULCOLAX) 10 MG SUPPOSITORY    Insert 10 mg into rectum daily as needed. DICYCLOMINE (BENTYL) 10 MG CAPSULE    Take 1 Cap by mouth four (4) times daily. IBUPROFEN (MOTRIN) 600 MG TABLET    Take 1 Tab by mouth every six (6) hours as needed for Pain. ONDANSETRON HCL (ZOFRAN, AS HYDROCHLORIDE,) 4 MG TABLET    Take 1 tablet by mouth every eight (8) hours as needed for Nausea. PANTOPRAZOLE (PROTONIX) 40 MG TABLET    Take 1 Tab by mouth two (2) times a day. PAROXETINE (PAXIL) 30 MG TABLET    Take 30 mg by mouth daily. POLYETHYLENE GLYCOL (MIRALAX) 17 GRAM PACKET    Take 1 Packet by mouth three (3) times daily.    These Medications have changed    No medications on file   Stop Taking    No medications on file

## 2017-11-21 NOTE — ED TRIAGE NOTES
Patient complains of vaginal bleeding X 2 week been off depo x 6 months. Does not think that she may have been exposed to STD. Patient states that she has been clotting very badly. Pain is in her abdomen and back. She is alert and oriented X 4.  Call bell within reach

## 2017-11-21 NOTE — LETTER
NOTIFICATION OF RETURN TO WORK 
 
11/21/2017 12:28 PM 
 
[facility] [address] [place] [id number] Olga Jones To Whom It May Concern: Mary Becker was under the care of [facility]. She will be able to return to work on Friday, 11/23/17 If there are questions or concerns please have the patient contact our office. Sincerely, Hunter Ahuja PA-C

## 2017-11-22 LAB
BACTERIA SPEC CULT: NORMAL
C TRACH RRNA SPEC QL NAA+PROBE: NEGATIVE
N GONORRHOEA RRNA SPEC QL NAA+PROBE: NEGATIVE
SERVICE CMNT-IMP: NORMAL
SPECIMEN SOURCE: NORMAL

## 2018-02-24 ENCOUNTER — HOSPITAL ENCOUNTER (EMERGENCY)
Age: 28
Discharge: HOME | End: 2018-02-24
Attending: EMERGENCY MEDICINE
Payer: MEDICAID

## 2018-02-24 VITALS
HEART RATE: 73 BPM | HEIGHT: 56 IN | BODY MASS INDEX: 21.15 KG/M2 | DIASTOLIC BLOOD PRESSURE: 74 MMHG | OXYGEN SATURATION: 99 % | RESPIRATION RATE: 18 BRPM | SYSTOLIC BLOOD PRESSURE: 111 MMHG | WEIGHT: 94 LBS | TEMPERATURE: 98.2 F

## 2018-02-24 DIAGNOSIS — S16.1XXA STRAIN OF NECK MUSCLE, INITIAL ENCOUNTER: ICD-10-CM
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
## 2018-02-24 DIAGNOSIS — M79.601 RIGHT ARM PAIN: ICD-10-CM

## 2018-02-24 PROCEDURE — 99282 EMERGENCY DEPT VISIT SF MDM: CPT

## 2018-02-24 RX ORDER — NAPROXEN 500 MG/1
500 TABLET ORAL 2 TIMES DAILY WITH MEALS
Qty: 20 TAB | Refills: 0 | Status: SHIPPED | OUTPATIENT
Start: 2018-02-24 | End: 2018-04-05

## 2018-02-24 RX ORDER — CYCLOBENZAPRINE HCL 5 MG
5 TABLET ORAL
Qty: 7 TAB | Refills: 0 | Status: SHIPPED | OUTPATIENT
Start: 2018-02-24 | End: 2018-04-05
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
## 2018-02-24 NOTE — LETTER
NOTIFICATION RETURN TO WORK / SCHOOL 
 
2/24/2018 2:38 PM 
 
Ms. Ryley Boogie [address] [address] To Whom It May Concern: Ryley Boogie is currently under the care of [id number] EMERGENCY DEPT. She will return to work/school on: 2.27.18 If there are questions or concerns please have the patient contact our office.  
 
 
 
Sincerely, 
 
 
SHANDA Cherry

## 2018-02-24 NOTE — DISCHARGE INSTRUCTIONS

## 2018-04-05 ENCOUNTER — HOSPITAL ENCOUNTER (EMERGENCY)
Age: 28
Discharge: HOME | End: 2018-04-05
Attending: EMERGENCY MEDICINE
Payer: MEDICAID

## 2018-04-05 VITALS
HEIGHT: 56 IN | OXYGEN SATURATION: 99 % | DIASTOLIC BLOOD PRESSURE: 99 MMHG | BODY MASS INDEX: 21.15 KG/M2 | TEMPERATURE: 98 F | WEIGHT: 94 LBS | SYSTOLIC BLOOD PRESSURE: 145 MMHG | HEART RATE: 79 BPM | RESPIRATION RATE: 18 BRPM

## 2018-04-05 DIAGNOSIS — N12 PYELONEPHRITIS: Primary | ICD-10-CM

## 2018-04-05 DIAGNOSIS — R03.0 ELEVATED BLOOD PRESSURE READING: ICD-10-CM

## 2018-04-05 DIAGNOSIS — R10.30 LOWER ABDOMINAL PAIN: ICD-10-CM

## 2018-04-05 DIAGNOSIS — E87.6 HYPOKALEMIA: ICD-10-CM

## 2018-04-05 LAB
ANION GAP SERPL CALC-SCNC: 8 MMOL/L (ref 3–18)
APPEARANCE UR: ABNORMAL
BACTERIA URNS QL MICRO: ABNORMAL /HPF
BASOPHILS # BLD: 0 K/UL (ref 0–0.06)
BASOPHILS NFR BLD: 0 % (ref 0–2)
BILIRUB UR QL: NEGATIVE
BUN SERPL-MCNC: 14 MG/DL (ref 7–18)
BUN/CREAT SERPL: 16 (ref 12–20)
CALCIUM SERPL-MCNC: 8.9 MG/DL (ref 8.5–10.1)
CHLORIDE SERPL-SCNC: 101 MMOL/L (ref 100–108)
CO2 SERPL-SCNC: 30 MMOL/L (ref 21–32)
COLOR UR: ABNORMAL
CREAT SERPL-MCNC: 0.89 MG/DL (ref 0.6–1.3)
DIFFERENTIAL METHOD BLD: ABNORMAL
EOSINOPHIL # BLD: 0 K/UL (ref 0–0.4)
EOSINOPHIL NFR BLD: 0 % (ref 0–5)
EPITH CASTS URNS QL MICRO: ABNORMAL /LPF (ref 0–5)
ERYTHROCYTE [DISTWIDTH] IN BLOOD BY AUTOMATED COUNT: 12.3 % (ref 11.6–14.5)
GLUCOSE SERPL-MCNC: 145 MG/DL (ref 74–99)
GLUCOSE UR STRIP.AUTO-MCNC: NEGATIVE MG/DL
HCG UR QL: NEGATIVE
HCT VFR BLD AUTO: 39.1 % (ref 35–45)
HGB BLD-MCNC: 13 G/DL (ref 12–16)
HGB UR QL STRIP: ABNORMAL
KETONES UR QL STRIP.AUTO: NEGATIVE MG/DL
LEUKOCYTE ESTERASE UR QL STRIP.AUTO: ABNORMAL
LYMPHOCYTES # BLD: 2.4 K/UL (ref 0.9–3.6)
LYMPHOCYTES NFR BLD: 21 % (ref 21–52)
MCH RBC QN AUTO: 32 PG (ref 24–34)
MCHC RBC AUTO-ENTMCNC: 33.2 G/DL (ref 31–37)
MCV RBC AUTO: 96.3 FL (ref 74–97)
MONOCYTES # BLD: 0.7 K/UL (ref 0.05–1.2)
MONOCYTES NFR BLD: 6 % (ref 3–10)
MUCOUS THREADS URNS QL MICRO: ABNORMAL /LPF
NEUTS SEG # BLD: 8.2 K/UL (ref 1.8–8)
NEUTS SEG NFR BLD: 73 % (ref 40–73)
NITRITE UR QL STRIP.AUTO: POSITIVE
PH UR STRIP: 5.5 [PH] (ref 5–8)
PLATELET # BLD AUTO: 229 K/UL (ref 135–420)
PMV BLD AUTO: 9.4 FL (ref 9.2–11.8)
POTASSIUM SERPL-SCNC: 3.1 MMOL/L (ref 3.5–5.5)
PROT UR STRIP-MCNC: ABNORMAL MG/DL
RBC # BLD AUTO: 4.06 M/UL (ref 4.2–5.3)
RBC #/AREA URNS HPF: ABNORMAL /HPF (ref 0–5)
SERVICE CMNT-IMP: NORMAL
SODIUM SERPL-SCNC: 139 MMOL/L (ref 136–145)
SP GR UR REFRACTOMETRY: 1.03 (ref 1–1.03)
UROBILINOGEN UR QL STRIP.AUTO: 1 EU/DL (ref 0.2–1)
WBC # BLD AUTO: 11.4 K/UL (ref 4.6–13.2)
WBC URNS QL MICRO: ABNORMAL /HPF (ref 0–4)
WET PREP GENITAL: NORMAL

## 2018-04-05 PROCEDURE — 81001 URINALYSIS AUTO W/SCOPE: CPT | Performed by: NURSE PRACTITIONER

## 2018-04-05 PROCEDURE — 87086 URINE CULTURE/COLONY COUNT: CPT | Performed by: NURSE PRACTITIONER

## 2018-04-05 PROCEDURE — 87210 SMEAR WET MOUNT SALINE/INK: CPT | Performed by: NURSE PRACTITIONER

## 2018-04-05 PROCEDURE — 87077 CULTURE AEROBIC IDENTIFY: CPT | Performed by: NURSE PRACTITIONER

## 2018-04-05 PROCEDURE — 96372 THER/PROPH/DIAG INJ SC/IM: CPT

## 2018-04-05 PROCEDURE — 85025 COMPLETE CBC W/AUTO DIFF WBC: CPT | Performed by: NURSE PRACTITIONER

## 2018-04-05 PROCEDURE — 80048 BASIC METABOLIC PNL TOTAL CA: CPT | Performed by: NURSE PRACTITIONER

## 2018-04-05 PROCEDURE — 74011250637 HC RX REV CODE- 250/637: Performed by: NURSE PRACTITIONER

## 2018-04-05 PROCEDURE — 74011250636 HC RX REV CODE- 250/636: Performed by: NURSE PRACTITIONER

## 2018-04-05 PROCEDURE — 81025 URINE PREGNANCY TEST: CPT | Performed by: NURSE PRACTITIONER

## 2018-04-05 PROCEDURE — 99283 EMERGENCY DEPT VISIT LOW MDM: CPT

## 2018-04-05 PROCEDURE — 87186 SC STD MICRODIL/AGAR DIL: CPT | Performed by: NURSE PRACTITIONER

## 2018-04-05 PROCEDURE — 87491 CHLMYD TRACH DNA AMP PROBE: CPT | Performed by: NURSE PRACTITIONER

## 2018-04-05 RX ORDER — POTASSIUM CHLORIDE 750 MG/1
20 TABLET, FILM COATED, EXTENDED RELEASE ORAL 2 TIMES DAILY
Qty: 10 TAB | Refills: 0 | Status: SHIPPED | OUTPATIENT
Start: 2018-04-05 | End: 2018-04-10

## 2018-04-05 RX ORDER — PHENAZOPYRIDINE HYDROCHLORIDE 200 MG/1
200 TABLET, FILM COATED ORAL 3 TIMES DAILY
Qty: 6 TAB | Refills: 0 | Status: SHIPPED | OUTPATIENT
Start: 2018-04-05 | End: 2018-04-07

## 2018-04-05 RX ORDER — QUETIAPINE FUMARATE 50 MG/1
50 TABLET, FILM COATED ORAL
COMMUNITY
End: 2018-05-08

## 2018-04-05 RX ORDER — AMITRIPTYLINE HYDROCHLORIDE 75 MG/1
25 TABLET, FILM COATED ORAL
Status: ON HOLD | COMMUNITY
End: 2018-05-03

## 2018-04-05 RX ORDER — KETOROLAC TROMETHAMINE 30 MG/ML
30 INJECTION, SOLUTION INTRAMUSCULAR; INTRAVENOUS
Status: COMPLETED | OUTPATIENT
Start: 2018-04-05 | End: 2018-04-05

## 2018-04-05 RX ORDER — CEPHALEXIN 500 MG/1
500 CAPSULE ORAL 2 TIMES DAILY
Qty: 28 CAP | Refills: 0 | Status: SHIPPED | OUTPATIENT
Start: 2018-04-05 | End: 2018-04-12

## 2018-04-05 RX ORDER — CEPHALEXIN 250 MG/1
500 CAPSULE ORAL
Status: COMPLETED | OUTPATIENT
Start: 2018-04-05 | End: 2018-04-05

## 2018-04-05 RX ORDER — IBUPROFEN 600 MG/1
600 TABLET ORAL
Qty: 20 TAB | Refills: 0 | Status: SHIPPED | OUTPATIENT
Start: 2018-04-05 | End: 2018-05-08

## 2018-04-05 RX ADMIN — CEPHALEXIN 500 MG: 250 CAPSULE ORAL at 12:45

## 2018-04-05 RX ADMIN — KETOROLAC TROMETHAMINE 30 MG: 30 INJECTION, SOLUTION INTRAMUSCULAR; INTRAVENOUS at 13:02

## 2018-04-05 NOTE — ED NOTES
Pt was provided with a sterile specimen cup and instructed to collect clean catch specimen. Pt verbalized understanding.

## 2018-04-05 NOTE — DISCHARGE INSTRUCTIONS
Abdominal Pain: Care Instructions  Your Care Instructions    Abdominal pain has many possible causes. Some aren't serious and get better on their own in a few days. Others need more testing and treatment. If your pain continues or gets worse, you need to be rechecked and may need more tests to find out what is wrong. You may need surgery to correct the problem. Don't ignore new symptoms, such as fever, nausea and vomiting, urination problems, pain that gets worse, and dizziness. These may be signs of a more serious problem. Your doctor may have recommended a follow-up visit in the next 8 to 12 hours. If you are not getting better, you may need more tests or treatment. The doctor has checked you carefully, but problems can develop later. If you notice any problems or new symptoms, get medical treatment right away. Follow-up care is a key part of your treatment and safety. Be sure to make and go to all appointments, and call your doctor if you are having problems. It's also a good idea to know your test results and keep a list of the medicines you take. How can you care for yourself at home? · Rest until you feel better. · To prevent dehydration, drink plenty of fluids, enough so that your urine is light yellow or clear like water. Choose water and other caffeine-free clear liquids until you feel better. If you have kidney, heart, or liver disease and have to limit fluids, talk with your doctor before you increase the amount of fluids you drink. · If your stomach is upset, eat mild foods, such as rice, dry toast or crackers, bananas, and applesauce. Try eating several small meals instead of two or three large ones. · Wait until 48 hours after all symptoms have gone away before you have spicy foods, alcohol, and drinks that contain caffeine. · Do not eat foods that are high in fat. · Avoid anti-inflammatory medicines such as aspirin, ibuprofen (Advil, Motrin), and naproxen (Aleve).  These can cause stomach upset. Talk to your doctor if you take daily aspirin for another health problem. When should you call for help? Call 911 anytime you think you may need emergency care. For example, call if:  ? · You passed out (lost consciousness). ? · You pass maroon or very bloody stools. ? · You vomit blood or what looks like coffee grounds. ? · You have new, severe belly pain. ?Call your doctor now or seek immediate medical care if:  ? · Your pain gets worse, especially if it becomes focused in one area of your belly. ? · You have a new or higher fever. ? · Your stools are black and look like tar, or they have streaks of blood. ? · You have unexpected vaginal bleeding. ? · You have symptoms of a urinary tract infection. These may include:  ¨ Pain when you urinate. ¨ Urinating more often than usual.  ¨ Blood in your urine. ? · You are dizzy or lightheaded, or you feel like you may faint. ? Watch closely for changes in your health, and be sure to contact your doctor if:  ? · You are not getting better after 1 day (24 hours). Where can you learn more? Go to http://gerardoPromoRepublicstan.info/. Enter C787 in the search box to learn more about \"Abdominal Pain: Care Instructions. \"  Current as of: March 20, 2017  Content Version: 11.4  © 8751-9421 [employer]. Care instructions adapted under license by [employer] (which disclaims liability or warranty for this information). If you have questions about a medical condition or this instruction, always ask your healthcare professional. [place] [address] any warranty or liability for your use of this information. Hypokalemia: Care Instructions  Your Care Instructions    Hypokalemia (say \"ad-hz-plp-KIMBERLY-colton-uh\") is a low level of potassium. The heart, muscles, kidneys, and nervous system all need potassium to work well. This problem has many different causes.  Kidney problems, diet, and medicines like diuretics and laxatives can cause it. So can vomiting or diarrhea. In some cases, cancer is the cause. Your doctor may do tests to find the cause of your low potassium levels. You may need medicines to bring your potassium levels back to normal. You may also need regular blood tests to check your potassium. If you have very low potassium, you may need intravenous (IV) medicines. You also may need tests to check the electrical activity of your heart. Heart problems caused by low potassium levels can be very serious. Follow-up care is a key part of your treatment and safety. Be sure to make and go to all appointments, and call your doctor if you are having problems. It's also a good idea to know your test results and keep a list of the medicines you take. How can you care for yourself at home? · If your doctor recommends it, eat foods that have a lot of potassium. These include fresh fruits, juices, and vegetables. They also include nuts, beans, and milk. · Be safe with medicines. If your doctor prescribes medicines or potassium supplements, take them exactly as directed. Call your doctor if you have any problems with your medicines. · Get your potassium levels tested as often as your doctor tells you. When should you call for help? Call 911 anytime you think you may need emergency care. For example, call if:  ? · You feel like your heart is missing beats. Heart problems caused by low potassium can cause death. ? · You passed out (lost consciousness). ? · You have a seizure. ?Call your doctor now or seek immediate medical care if:  ? · You feel weak or unusually tired. ? · You have severe arm or leg cramps. ? · You have tingling or numbness. ? · You feel sick to your stomach, or you vomit. ? · You have belly cramps. ? · You feel bloated or constipated. ? · You have to urinate a lot. ? · You feel very thirsty most of the time.    ? · You are dizzy or lightheaded, or you feel like you may faint.   ? · You feel depressed, or you lose touch with reality. ? Watch closely for changes in your health, and be sure to contact your doctor if:  ? · You do not get better as expected. Where can you learn more? Go to http://gerardo-stan.info/. Enter G358 in the search box to learn more about \"Hypokalemia: Care Instructions. \"  Current as of: May 12, 2017  Content Version: 11.4  © 9090-1800 [employer], [employer]. Care instructions adapted under license by [employer] (which disclaims liability or warranty for this information). If you have questions about a medical condition or this instruction, always ask your healthcare professional. [address] any warranty or liability for your use of this information.

## 2018-04-05 NOTE — ED PROVIDER NOTES
HPI Comments: 11:26 AM   33 y.o. female presents to ED C/O lower abdominal pain, vaginal bleeding. Patient has a HX of Bipolar, depression. Patient reports constant aching lower abdominal pain x 1.5 weeks. Assoicated with dysuria x 2 weeks, and increased urinary frequency. Patient also reports her periods normally only last three days but she has been bleeding x 1 week, LMP 03/06. Patient denies N/V/D/, fever, vaginal discharge. Patient denies concerns for STIs, denies new sexual partner. Patient is a nonsmoker. Patient denies any other symptoms or complaints. The history is provided by the patient. History limited by: No language barrier. Past Medical History:   Diagnosis Date    Bipolar 1 disorder, depressed ([facility] [address]     Neurological disorder     Psychiatric disorder        Past Surgical History:   Procedure Laterality Date    UPPER GI ENDOSCOPY,BIOPSY  3/29/2017              History reviewed. No pertinent family history. Social History     Social History    Marital status: SINGLE     Spouse name: N/A    Number of children: N/A    Years of education: N/A     Occupational History    Not on file. Social History Main Topics    Smoking status: Never Smoker    Smokeless tobacco: Never Used    Alcohol use 0.5 oz/week     1 Standard drinks or equivalent per week      Comment: 5-6 shots/week    Drug use: No    Sexual activity: Yes     Partners: Male     Birth control/ protection: None     Other Topics Concern    Not on file     Social History Narrative         ALLERGIES: Latex, natural rubber and Doxycycline    Review of Systems   Constitutional: Negative for appetite change and fever. HENT: Negative for congestion, rhinorrhea and sore throat. Respiratory: Negative for cough, shortness of breath and wheezing. Cardiovascular: Negative for chest pain and leg swelling. Gastrointestinal: Positive for abdominal pain. Negative for constipation, diarrhea, nausea and vomiting. Genitourinary: Positive for dysuria, pelvic pain and vaginal bleeding. Musculoskeletal: Negative for arthralgias and back pain. Neurological: Negative for dizziness, syncope and headaches. All other systems reviewed and are negative. Vitals:    04/05/18 1122   BP: (!) 145/99   Pulse: 79   Resp: 18   Temp: 98 °F (36.7 °C)   SpO2: 99%   Weight: 42.6 kg (94 lb)   Height: 4' 8\" (1.422 m)            Physical Exam   Constitutional: She is oriented to person, place, and time. She appears well-developed and well-nourished. No distress. HENT:   Head: Atraumatic. Mouth/Throat: Oropharynx is clear and moist.   Eyes: Conjunctivae and EOM are normal.   Cardiovascular: Normal rate, regular rhythm and normal heart sounds. Pulmonary/Chest: Effort normal and breath sounds normal. No respiratory distress. She has no wheezes. She has no rales. Abdominal: Soft. Normal appearance. There is tenderness in the right lower quadrant, suprapubic area and left lower quadrant. There is CVA tenderness. There is no rigidity, no rebound and no guarding. Genitourinary:   Genitourinary Comments: See pelvic exam   Musculoskeletal: Normal range of motion. Neurological: She is alert and oriented to person, place, and time. She exhibits normal muscle tone. Coordination normal.   Skin: Skin is warm and dry. No rash noted. She is not diaphoretic. No erythema. No pallor. Nursing note and vitals reviewed.        MDM  Number of Diagnoses or Management Options  Elevated blood pressure reading:   Hypokalemia:   Lower abdominal pain:   Pyelonephritis:   Diagnosis management comments: Differential Diagnosis: appendicitis, ectopic pregnancy, normal pregnancy, TOA, PID, ovarian cyst, endometriosis, endometritis, uterine fibroids, constipation, gastroenteritis, IBS, IBD, celiac disease, diverticulitis, nephrolithiasis, pyelonephritis, cystitis, mesenteric ischemia, mesenteric adenitis, ruptured AAA, SBO, LBO    Plan: CBC, BMP, UA, HCG pelvic exam.  At this time no indication for imagining, pain for over a week, doubt acute/ surgical abdominal issue   -k is 3.1, will discharge with home supplements. No elevated WBC  Progress- evidence of UTI, will treat for pylo  due to physical exam findings. Patient discharged with keflex, and urine culture in process. Patient [facility] for further evaluation. Low concern for PID due to no significant CMT and no abnormal discharge. Patient educated to return to the ED for any new or worsening symptoms. Questions denied        Amount and/or Complexity of Data Reviewed  Clinical lab tests: ordered and reviewed          ED Course       Pelvic Exam  Date/Time: 4/5/2018 12:04 PM  Performed by: NP  Chaperone: Frances SEALS. Type of exam performed: bimanual and speculum. External genitalia appearance: normal.    Vaginal exam:  bleeding. Bleeding: mild  Cervical exam:  active bleeding from os and minimal cervical motion tenderness. Specimen(s) collected:  chlamydia, GC and vaginal culture. Bimanual exam:  left adenexal tenderness and right adenexal mass. Patient tolerance: Patient tolerated the procedure well with no immediate complications  Comments: Mild generalized TTP reported during bilateral exam, no significant distress noted. RESULTS:    No orders to display       Labs Reviewed   URINALYSIS W/ RFLX MICROSCOPIC - Abnormal; Notable for the following:        Result Value    Protein TRACE (*)     Blood MODERATE (*)     Nitrites POSITIVE (*)     Leukocyte Esterase MODERATE (*)     All other components within normal limits   URINE MICROSCOPIC ONLY - Abnormal; Notable for the following:     Bacteria 4+ (*)     Mucus 3+ (*)     All other components within normal limits   CBC WITH AUTOMATED DIFF - Abnormal; Notable for the following:     RBC 4.06 (*)     ABS.  NEUTROPHILS 8.2 (*)     All other components within normal limits   METABOLIC PANEL, BASIC - Abnormal; Notable for the following:     Potassium 3.1 (*)     Glucose 145 (*)     All other components within normal limits   WET PREP   CULTURE, URINE   HCG URINE, QL   CHLAMYDIA/NEISSERIA AMPLIFICATION       Recent Results (from the past 12 hour(s))   URINALYSIS W/ RFLX MICROSCOPIC    Collection Time: 04/05/18 11:26 AM   Result Value Ref Range    Color DARK YELLOW      Appearance TURBID      Specific gravity 1.026 1.005 - 1.030      pH (UA) 5.5 5.0 - 8.0      Protein TRACE (A) NEG mg/dL    Glucose NEGATIVE  NEG mg/dL    Ketone NEGATIVE  NEG mg/dL    Bilirubin NEGATIVE  NEG      Blood MODERATE (A) NEG      Urobilinogen 1.0 0.2 - 1.0 EU/dL    Nitrites POSITIVE (A) NEG      Leukocyte Esterase MODERATE (A) NEG     HCG URINE, QL    Collection Time: 04/05/18 11:26 AM   Result Value Ref Range    HCG urine, QL NEGATIVE  NEG     URINE MICROSCOPIC ONLY    Collection Time: 04/05/18 11:26 AM   Result Value Ref Range    WBC TOO NUMEROUS TO COUNT 0 - 4 /hpf    RBC 4 to 10 0 - 5 /hpf    Epithelial cells 2+ 0 - 5 /lpf    Bacteria 4+ (A) NEG /hpf    Mucus 3+ (A) NEG /lpf   CBC WITH AUTOMATED DIFF    Collection Time: 04/05/18 11:50 AM   Result Value Ref Range    WBC 11.4 4.6 - 13.2 K/uL    RBC 4.06 (L) 4.20 - 5.30 M/uL    HGB 13.0 12.0 - 16.0 g/dL    HCT 39.1 35.0 - 45.0 %    MCV 96.3 74.0 - 97.0 FL    MCH 32.0 24.0 - 34.0 PG    MCHC 33.2 31.0 - 37.0 g/dL    RDW 12.3 11.6 - 14.5 %    PLATELET 102 528 - 697 K/uL    MPV 9.4 9.2 - 11.8 FL    NEUTROPHILS 73 40 - 73 %    LYMPHOCYTES 21 21 - 52 %    MONOCYTES 6 3 - 10 %    EOSINOPHILS 0 0 - 5 %    BASOPHILS 0 0 - 2 %    ABS. NEUTROPHILS 8.2 (H) 1.8 - 8.0 K/UL    ABS. LYMPHOCYTES 2.4 0.9 - 3.6 K/UL    ABS. MONOCYTES 0.7 0.05 - 1.2 K/UL    ABS. EOSINOPHILS 0.0 0.0 - 0.4 K/UL    ABS.  BASOPHILS 0.0 0.0 - 0.06 K/UL    DF AUTOMATED     METABOLIC PANEL, BASIC    Collection Time: 04/05/18 11:50 AM   Result Value Ref Range    Sodium 139 136 - 145 mmol/L    Potassium 3.1 (L) 3.5 - 5.5 mmol/L    Chloride 101 100 - 108 mmol/L    CO2 30 21 - 32 mmol/L    Anion gap 8 3.0 - 18 mmol/L    Glucose 145 (H) 74 - 99 mg/dL    BUN 14 7.0 - 18 MG/DL    Creatinine 0.89 0.6 - 1.3 MG/DL    BUN/Creatinine ratio 16 12 - 20      GFR est AA >60 >60 ml/min/1.73m2    GFR est non-AA >60 >60 ml/min/1.73m2    Calcium 8.9 8.5 - 10.1 MG/DL   WET PREP    Collection Time: 04/05/18 12:01 PM   Result Value Ref Range    Special Requests: NO SPECIAL REQUESTS      Wet prep        FEW  CLUE CELLS PRESENT  NO YEAST SEEN  NO TRICHOMONAS SEEN         PROGRESS NOTE:   11:26 AM   Initial assessment completed. Written by Beto GUAJARDO    One or more blood pressure readings were noted elevated during the Pt's presentation in the emergency department this date. This abnormal reading has been cited in the Pt's diagnosis, and they have been encouraged to follow up with their primary care physician, or referred to a consultant for further evaluation and treatment. DISCHARGE NOTE:  74:84 PM   Umer Nair's  results have been reviewed with her. She has been counseled regarding her diagnosis, treatment, and plan. She verbally conveys understanding and agreement of the signs, symptoms, diagnosis, treatment and prognosis and additionally agrees to follow up as discussed. She also agrees with the care-plan and conveys that all of her questions have been answered. I have also provided discharge instructions for her that include: educational information regarding their diagnosis and treatment, and list of reasons why they would want to return to the ED prior to their follow-up appointment, should her condition change. CLINICAL IMPRESSION:    1. Pyelonephritis    2. Hypokalemia    3. Elevated blood pressure reading    4. Lower abdominal pain        AFTER VISIT PLAN:    Current Discharge Medication List      START taking these medications    Details   ibuprofen (MOTRIN) 600 mg tablet Take 1 Tab by mouth every six (6) hours as needed for Pain.   Qty: 20 Tab, Refills: 0      cephALEXin (KEFLEX) 500 mg capsule Take 1 Cap by mouth two (2) times a day for 7 days. Qty: 28 Cap, Refills: 0      phenazopyridine (PYRIDIUM) 200 mg tablet Take 1 Tab by mouth three (3) times daily for 2 days. Qty: 6 Tab, Refills: 0      potassium chloride SR (KLOR-CON 10) 10 mEq tablet Take 2 Tabs by mouth two (2) times a day for 5 days.   Qty: 10 Tab, Refills: 0              Follow-up Information     Follow up With Details Comments [address], [place] Schedule an appointment as soon as possible for a visit in 1 week Further evaluation [address] [place] [address]  [phone]             Written by Nestor GUAJARDO

## 2018-04-07 LAB
BACTERIA SPEC CULT: ABNORMAL
SERVICE CMNT-IMP: ABNORMAL

## 2018-05-01 ENCOUNTER — APPOINTMENT (OUTPATIENT)
Dept: GENERAL RADIOLOGY | Age: 28
DRG: 720 | End: 2018-05-01
Attending: EMERGENCY MEDICINE
Payer: MEDICAID

## 2018-05-01 ENCOUNTER — APPOINTMENT (OUTPATIENT)
Dept: CT IMAGING | Age: 28
DRG: 720 | End: 2018-05-01
Attending: EMERGENCY MEDICINE
Payer: MEDICAID

## 2018-05-01 ENCOUNTER — HOSPITAL ENCOUNTER (EMERGENCY)
Age: 28
Discharge: HOME | DRG: 720 | End: 2018-05-02
Attending: EMERGENCY MEDICINE | Admitting: EMERGENCY MEDICINE
Payer: MEDICAID

## 2018-05-01 DIAGNOSIS — N30.00 ACUTE CYSTITIS WITHOUT HEMATURIA: Primary | ICD-10-CM

## 2018-05-01 DIAGNOSIS — N12 PYELONEPHRITIS: ICD-10-CM

## 2018-05-01 LAB
ALBUMIN SERPL-MCNC: 3.5 G/DL (ref 3.4–5)
ALBUMIN/GLOB SERPL: 0.8 {RATIO} (ref 0.8–1.7)
ALP SERPL-CCNC: 211 U/L (ref 45–117)
ALT SERPL-CCNC: 46 U/L (ref 13–56)
ANION GAP SERPL CALC-SCNC: 8 MMOL/L (ref 3–18)
APPEARANCE UR: ABNORMAL
AST SERPL-CCNC: 35 U/L (ref 15–37)
BACTERIA URNS QL MICRO: ABNORMAL /HPF
BASOPHILS # BLD: 0 K/UL (ref 0–0.1)
BASOPHILS NFR BLD: 0 % (ref 0–2)
BILIRUB DIRECT SERPL-MCNC: 0.7 MG/DL (ref 0–0.2)
BILIRUB SERPL-MCNC: 1.5 MG/DL (ref 0.2–1)
BILIRUB UR QL: ABNORMAL
BUN SERPL-MCNC: 10 MG/DL (ref 7–18)
BUN/CREAT SERPL: 11 (ref 12–20)
CALCIUM SERPL-MCNC: 8.7 MG/DL (ref 8.5–10.1)
CHLORIDE SERPL-SCNC: 99 MMOL/L (ref 100–108)
CO2 SERPL-SCNC: 27 MMOL/L (ref 21–32)
COLOR UR: ABNORMAL
CREAT SERPL-MCNC: 0.9 MG/DL (ref 0.6–1.3)
DIFFERENTIAL METHOD BLD: ABNORMAL
EOSINOPHIL # BLD: 0 K/UL (ref 0–0.4)
EOSINOPHIL NFR BLD: 0 % (ref 0–5)
EPITH CASTS URNS QL MICRO: ABNORMAL /LPF (ref 0–5)
ERYTHROCYTE [DISTWIDTH] IN BLOOD BY AUTOMATED COUNT: 12.8 % (ref 11.6–14.5)
GLOBULIN SER CALC-MCNC: 4.5 G/DL (ref 2–4)
GLUCOSE SERPL-MCNC: 111 MG/DL (ref 74–99)
GLUCOSE UR STRIP.AUTO-MCNC: NEGATIVE MG/DL
HCG UR QL: NEGATIVE
HCT VFR BLD AUTO: 40.4 % (ref 35–45)
HGB BLD-MCNC: 13.9 G/DL (ref 12–16)
HGB UR QL STRIP: ABNORMAL
KETONES UR QL STRIP.AUTO: 80 MG/DL
LACTATE BLD-SCNC: 0.6 MMOL/L (ref 0.4–2)
LEUKOCYTE ESTERASE UR QL STRIP.AUTO: ABNORMAL
LIPASE SERPL-CCNC: 50 U/L (ref 73–393)
LYMPHOCYTES # BLD: 1.3 K/UL (ref 0.9–3.6)
LYMPHOCYTES NFR BLD: 10 % (ref 21–52)
MCH RBC QN AUTO: 32.9 PG (ref 24–34)
MCHC RBC AUTO-ENTMCNC: 34.4 G/DL (ref 31–37)
MCV RBC AUTO: 95.5 FL (ref 74–97)
MONOCYTES # BLD: 1.4 K/UL (ref 0.05–1.2)
MONOCYTES NFR BLD: 10 % (ref 3–10)
MUCOUS THREADS URNS QL MICRO: ABNORMAL /LPF
NEUTS SEG # BLD: 10.5 K/UL (ref 1.8–8)
NEUTS SEG NFR BLD: 80 % (ref 40–73)
NITRITE UR QL STRIP.AUTO: POSITIVE
PH UR STRIP: 5.5 [PH] (ref 5–8)
PLATELET # BLD AUTO: 148 K/UL (ref 135–420)
PMV BLD AUTO: 10.4 FL (ref 9.2–11.8)
POTASSIUM SERPL-SCNC: 3.5 MMOL/L (ref 3.5–5.5)
PROT SERPL-MCNC: 8 G/DL (ref 6.4–8.2)
PROT UR STRIP-MCNC: 100 MG/DL
RBC # BLD AUTO: 4.23 M/UL (ref 4.2–5.3)
RBC #/AREA URNS HPF: ABNORMAL /HPF (ref 0–5)
SODIUM SERPL-SCNC: 134 MMOL/L (ref 136–145)
SP GR UR REFRACTOMETRY: 1.02 (ref 1–1.03)
UROBILINOGEN UR QL STRIP.AUTO: 1 EU/DL (ref 0.2–1)
WBC # BLD AUTO: 13.2 K/UL (ref 4.6–13.2)
WBC URNS QL MICRO: ABNORMAL /HPF (ref 0–4)

## 2018-05-01 PROCEDURE — 83690 ASSAY OF LIPASE: CPT | Performed by: EMERGENCY MEDICINE

## 2018-05-01 PROCEDURE — 87186 SC STD MICRODIL/AGAR DIL: CPT | Performed by: EMERGENCY MEDICINE

## 2018-05-01 PROCEDURE — 74177 CT ABD & PELVIS W/CONTRAST: CPT

## 2018-05-01 PROCEDURE — 87077 CULTURE AEROBIC IDENTIFY: CPT | Performed by: EMERGENCY MEDICINE

## 2018-05-01 PROCEDURE — 80048 BASIC METABOLIC PNL TOTAL CA: CPT | Performed by: EMERGENCY MEDICINE

## 2018-05-01 PROCEDURE — 81001 URINALYSIS AUTO W/SCOPE: CPT | Performed by: EMERGENCY MEDICINE

## 2018-05-01 PROCEDURE — 85025 COMPLETE CBC W/AUTO DIFF WBC: CPT | Performed by: EMERGENCY MEDICINE

## 2018-05-01 PROCEDURE — 81025 URINE PREGNANCY TEST: CPT | Performed by: EMERGENCY MEDICINE

## 2018-05-01 PROCEDURE — 93005 ELECTROCARDIOGRAM TRACING: CPT

## 2018-05-01 PROCEDURE — 96361 HYDRATE IV INFUSION ADD-ON: CPT

## 2018-05-01 PROCEDURE — 74011250636 HC RX REV CODE- 250/636: Performed by: EMERGENCY MEDICINE

## 2018-05-01 PROCEDURE — 87086 URINE CULTURE/COLONY COUNT: CPT | Performed by: EMERGENCY MEDICINE

## 2018-05-01 PROCEDURE — 83605 ASSAY OF LACTIC ACID: CPT

## 2018-05-01 PROCEDURE — 87040 BLOOD CULTURE FOR BACTERIA: CPT | Performed by: EMERGENCY MEDICINE

## 2018-05-01 PROCEDURE — 74011000250 HC RX REV CODE- 250: Performed by: EMERGENCY MEDICINE

## 2018-05-01 PROCEDURE — 74022 RADEX COMPL AQT ABD SERIES: CPT

## 2018-05-01 PROCEDURE — 96374 THER/PROPH/DIAG INJ IV PUSH: CPT

## 2018-05-01 PROCEDURE — 74011636320 HC RX REV CODE- 636/320: Performed by: EMERGENCY MEDICINE

## 2018-05-01 PROCEDURE — 99285 EMERGENCY DEPT VISIT HI MDM: CPT

## 2018-05-01 PROCEDURE — 96375 TX/PRO/DX INJ NEW DRUG ADDON: CPT

## 2018-05-01 PROCEDURE — 80076 HEPATIC FUNCTION PANEL: CPT | Performed by: EMERGENCY MEDICINE

## 2018-05-01 RX ORDER — SODIUM CHLORIDE 0.9 % (FLUSH) 0.9 %
5-10 SYRINGE (ML) INJECTION AS NEEDED
Status: DISCONTINUED | OUTPATIENT
Start: 2018-05-01 | End: 2018-05-02 | Stop reason: HOSPADM

## 2018-05-01 RX ORDER — ONDANSETRON 2 MG/ML
4 INJECTION INTRAMUSCULAR; INTRAVENOUS
Status: COMPLETED | OUTPATIENT
Start: 2018-05-01 | End: 2018-05-01

## 2018-05-01 RX ADMIN — IOPAMIDOL 57 ML: 612 INJECTION, SOLUTION INTRAVENOUS at 22:57

## 2018-05-01 RX ADMIN — ONDANSETRON 4 MG: 2 INJECTION INTRAMUSCULAR; INTRAVENOUS at 22:23

## 2018-05-01 RX ADMIN — SODIUM CHLORIDE 1293 ML: 900 INJECTION, SOLUTION INTRAVENOUS at 22:38

## 2018-05-01 RX ADMIN — SODIUM CHLORIDE 1000 ML: 900 INJECTION, SOLUTION INTRAVENOUS at 22:23

## 2018-05-01 RX ADMIN — CEFEPIME HYDROCHLORIDE 2 G: 2 INJECTION, POWDER, FOR SOLUTION INTRAVENOUS at 22:38

## 2018-05-02 ENCOUNTER — HOSPITAL ENCOUNTER (INPATIENT)
Age: 28
LOS: 5 days | Discharge: HOME | DRG: 720 | End: 2018-05-08
Attending: EMERGENCY MEDICINE | Admitting: FAMILY MEDICINE
Payer: MEDICAID

## 2018-05-02 VITALS
RESPIRATION RATE: 19 BRPM | TEMPERATURE: 99.3 F | BODY MASS INDEX: 21.37 KG/M2 | HEIGHT: 56 IN | WEIGHT: 95 LBS | SYSTOLIC BLOOD PRESSURE: 115 MMHG | HEART RATE: 97 BPM | OXYGEN SATURATION: 100 % | DIASTOLIC BLOOD PRESSURE: 63 MMHG

## 2018-05-02 DIAGNOSIS — R78.81 BACTEREMIA: Primary | ICD-10-CM

## 2018-05-02 DIAGNOSIS — N12 PYELONEPHRITIS: ICD-10-CM

## 2018-05-02 LAB
ATRIAL RATE: 78 BPM
CALCULATED P AXIS, ECG09: 50 DEGREES
CALCULATED R AXIS, ECG10: 61 DEGREES
CALCULATED T AXIS, ECG11: 46 DEGREES
DIAGNOSIS, 93000: NORMAL
P-R INTERVAL, ECG05: 144 MS
Q-T INTERVAL, ECG07: 340 MS
QRS DURATION, ECG06: 76 MS
QTC CALCULATION (BEZET), ECG08: 387 MS
VENTRICULAR RATE, ECG03: 78 BPM

## 2018-05-02 PROCEDURE — 74011250637 HC RX REV CODE- 250/637: Performed by: NURSE PRACTITIONER

## 2018-05-02 PROCEDURE — 99282 EMERGENCY DEPT VISIT SF MDM: CPT

## 2018-05-02 RX ORDER — PHENAZOPYRIDINE HYDROCHLORIDE 200 MG/1
200 TABLET, FILM COATED ORAL
Status: COMPLETED | OUTPATIENT
Start: 2018-05-02 | End: 2018-05-02

## 2018-05-02 RX ORDER — OXYCODONE AND ACETAMINOPHEN 5; 325 MG/1; MG/1
1 TABLET ORAL
Status: COMPLETED | OUTPATIENT
Start: 2018-05-02 | End: 2018-05-02

## 2018-05-02 RX ORDER — OXYCODONE AND ACETAMINOPHEN 5; 325 MG/1; MG/1
1 TABLET ORAL
Qty: 15 TAB | Refills: 0 | Status: SHIPPED | OUTPATIENT
Start: 2018-05-02 | End: 2018-05-08

## 2018-05-02 RX ORDER — PHENAZOPYRIDINE HYDROCHLORIDE 200 MG/1
200 TABLET, FILM COATED ORAL 3 TIMES DAILY
Qty: 6 TAB | Refills: 0 | Status: SHIPPED | OUTPATIENT
Start: 2018-05-02 | End: 2018-05-08

## 2018-05-02 RX ORDER — SULFAMETHOXAZOLE AND TRIMETHOPRIM 800; 160 MG/1; MG/1
1 TABLET ORAL 2 TIMES DAILY
Qty: 20 TAB | Refills: 0 | Status: SHIPPED | OUTPATIENT
Start: 2018-05-02 | End: 2018-05-08

## 2018-05-02 RX ADMIN — PHENAZOPYRIDINE HYDROCHLORIDE 200 MG: 200 TABLET ORAL at 01:42

## 2018-05-02 RX ADMIN — OXYCODONE HYDROCHLORIDE AND ACETAMINOPHEN 1 TABLET: 5; 325 TABLET ORAL at 01:42

## 2018-05-02 NOTE — IP AVS SNAPSHOT
[facility] 
 
 
 [address] [place] Patient: Michel Mojica MRN: [id number] [facility]:[id number] About your hospitalization You were admitted on:  May 3, 2018 You last received care in the:  [facility] [address] [facility] You were discharged on: May 8, 2018 Why you were hospitalized Your primary diagnosis was:  Not on File Your diagnoses also included:  Bacteremia, Pyelonephritis Follow-up Information Follow up With Details Comments Contact Info Mitchel Cardoza MD Go on 5/9/2018 follow up @1:30pm Rodri White [id number] 
[phone] Discharge Orders None A check palma indicates which time of day the medication should be taken. My Medications START taking these medications Instructions Each Dose to Equal  
 Morning Noon Evening Bedtime  
 bisacodyl 10 mg suppository Commonly known as:  DULCOLAX Your last dose was: Your next dose is: Insert 10 mg into rectum daily as needed. Indications: constipation 10 mg  
    
   
   
   
  
 ciprofloxacin HCl 500 mg tablet Commonly known as:  CIPRO Your last dose was: Your next dose is: Take 1 Tab by mouth every twelve (12) hours for 17 doses. 500 mg  
    
   
   
   
  
 docusate sodium 100 mg capsule Commonly known as:  Walterine Milton Your last dose was: Your next dose is: Take 1 Cap by mouth two (2) times a day for 90 days. 100 mg  
    
   
   
   
  
 famotidine 20 mg tablet Commonly known as:  PEPCID Your last dose was: Your next dose is: Take 1 Tab by mouth two (2) times a day. 20 mg  
    
   
   
   
  
 ondansetron 4 mg disintegrating tablet Commonly known as:  ZOFRAN ODT Your last dose was: Your next dose is: Take 1 Tab by mouth every six (6) hours as needed. 4 mg CHANGE how you take these medications Instructions Each Dose to Equal  
 Morning Noon Evening Bedtime  
 oxyCODONE-acetaminophen 5-325 mg per tablet Commonly known as:  PERCOCET What changed:   
- when to take this 
- reasons to take this Your last dose was: Your next dose is: Take 1 Tab by mouth every six (6) hours as needed. Max Daily Amount: 4 Tabs. 1 Tab CONTINUE taking these medications Instructions Each Dose to Equal  
 Morning Noon Evening Bedtime  
 amitriptyline 25 mg tablet Commonly known as:  ELAVIL Your last dose was: Your next dose is: Take 25 mg by mouth nightly. 25 mg  
    
   
   
   
  
  
STOP taking these medications   
 ibuprofen 600 mg tablet Commonly known as:  MOTRIN  
   
  
 phenazopyridine 200 mg tablet Commonly known as:  PYRIDIUM SEROquel 50 mg tablet Generic drug:  QUEtiapine  
   
  
 trimethoprim-sulfamethoxazole 160-800 mg per tablet Commonly known as:  BACTRIM DS Where to Get Your Medications Information on where to get these meds will be given to you by the nurse or doctor. ! Ask your nurse or doctor about these medications  
  bisacodyl 10 mg suppository  
 ciprofloxacin HCl 500 mg tablet  
 docusate sodium 100 mg capsule  
 famotidine 20 mg tablet  
 ondansetron 4 mg disintegrating tablet  
 oxyCODONE-acetaminophen 5-325 mg per tablet Opioid Education Prescription Opioids: What You Need to Know: 
 
Prescription opioids can be used to help relieve moderate-to-severe pain and are often prescribed following a surgery or injury, or for certain health conditions. These medications can be an important part of treatment but also come with serious risks. Opioids are strong pain medicines.  Examples include hydrocodone, oxycodone, fentanyl, and morphine. Heroin is an example of an illegal opioid. It is important to work with your health care provider to make sure you are getting the safest, most effective care. WHAT ARE THE RISKS AND SIDE EFFECTS OF OPIOID USE? Prescription opioids carry serious risks of addiction and overdose, especially with prolonged use. An opioid overdose, often marked by slow breathing, can cause sudden death. The use of prescription opioids can have a number of side effects as well, even when taken as directed. · Tolerance-meaning you might need to take more of a medication for the same pain relief · Physical dependence-meaning you have symptoms of withdrawal when the medication is stopped. Withdrawal symptoms can include nausea, sweating, chills, diarrhea, stomach cramps, and muscle aches. Withdrawal can last up to several weeks, depending on which drug you took and how long you took it. · Increased sensitivity to pain · Constipation · Nausea, vomiting, and dry mouth · Sleepiness and dizziness · Confusion · Depression · Low levels of testosterone that can result in lower sex drive, energy, and strength · Itching and sweating RISKS ARE GREATER WITH:      
· History of drug misuse, substance use disorder, or overdose · Mental health conditions (such as depression or anxiety) · Sleep apnea · Older age (72 years or older) · Pregnancy Avoid alcohol while taking prescription opioids. Also, unless specifically advised by your health care provider, medications to avoid include: · Benzodiazepines (such as Xanax or Valium) · Muscle relaxants (such as Soma or Flexeril) · Hypnotics (such as Ambien or Lunesta) · Other prescription opioids KNOW YOUR OPTIONS Talk to your health care provider about ways to manage your pain that don't involve prescription opioids. Some of these options may actually work better and have fewer risks and side effects. Options may include: · Pain relievers such as acetaminophen, ibuprofen, and naproxen · Some medications that are also used for depression or seizures · Physical therapy and exercise · Counseling to help patients learn how to cope better with triggers of pain and stress. · Application of heat or cold compress · Massage therapy · Relaxation techniques Be Informed Make sure you know the name of your medication, how much and how often to take it, and its potential risks & side effects. IF YOU ARE PRESCRIBED OPIOIDS FOR PAIN: 
· Never take opioids in greater amounts or more often than prescribed. Remember the goal is not to be pain-free but to manage your pain at a tolerable level. · Follow up with your primary care provider to: · Work together to create a plan on how to manage your pain. · Talk about ways to help manage your pain that don't involve prescription opioids. · Talk about any and all concerns and side effects. · Help prevent misuse and abuse. · Never sell or share prescription opioids · Help prevent misuse and abuse. · Store prescription opioids in a secure place and out of reach of others (this may include visitors, children, friends, and family). · Safely dispose of unused/unwanted prescription opioids: Find your community drug take-back program or your pharmacy mail-back program, or flush them down the toilet, following guidance from the Food and Drug Administration (www.fda.gov/Drugs/ResourcesForYou). · Visit www.cdc.gov/drugoverdose to learn about the risks of opioid abuse and overdose. · If you believe you may be struggling with addiction, tell your health care provider and ask for guidance or call [employer] at 3-101-301-TLXY. Discharge Instructions Patient Discharge Instructions Dax Angela / [id number] : 1990 Admitted 2018 Discharged: 2018 · It is important that you take the medication exactly as they are prescribed. · Keep your medication in the bottles provided by the pharmacist and keep a list of the medication names, dosages, and times to be taken with you at all times. · Do not take other medications without consulting your doctor. What to do at [facility] JESSE MACIAS Do not take seroquel while taking this antibiotic medication. Recommended Diet: Regular Diet Recommended Activity: Activity as tolerated If you experience any of the following symptoms fever, inability to tolerate PO intake, severe abdominal or back pain, please follow up with ER. Signed By: Janelle Mendez MD   
 May 8, 2018 Patient armband removed and shredded MyChart Activation Thank you for requesting access to [employer]. Please follow the instructions below to securely access and download your online medical record. [employer] allows you to send messages to your doctor, view your test results, renew your prescriptions, schedule appointments, and more. How Do I Sign Up? 1. In your internet browser, go to www.[employer] 
2. Click on the First Time User? Click Here link in the Sign In box. You will be redirect to the New Member Sign Up page. 3. Enter your [employer] Access Code exactly as it appears below. You will not need to use this code after youve completed the sign-up process. If you do not sign up before the expiration date, you must request a new code. [employer] Access Code: Activation code not generated Current [employer] Status: Patient Declined (This is the date your [employer] access code will ) 4. Enter the last four digits of your Social Security Number (xxxx) and Date of Birth (mm/dd/yyyy) as indicated and click Submit. You will be taken to the next sign-up page. 5. Create a [employer] ID. This will be your [employer] login ID and cannot be changed, so think of one that is secure and easy to remember. 6. Create a [employer] password. You can change your password at any time. 7. Enter your Password Reset Question and Answer. This can be used at a later time if you forget your password. 8. Enter your e-mail address. You will receive e-mail notification when new information is available in [address] 9. Click Sign Up. You can now view and download portions of your medical record. 10. Click the Download Summary menu link to download a portable copy of your medical information. Additional Information If you have questions, please visit the Frequently Asked Questions section of the [employer] website at [email or web address] Remember, [employer] is NOT to be used for urgent needs. For medical emergencies, dial 911. DISCHARGE SUMMARY from Nurse PATIENT INSTRUCTIONS: 
 
 
F-face looks uneven A-arms unable to move or move unevenly S-speech slurred or non-existent T-time-call 911 as soon as signs and symptoms begin-DO NOT go Back to bed or wait to see if you get better-TIME IS BRAIN. Warning Signs of HEART ATTACK Call 911 if you have these symptoms: 
? Chest discomfort. Most heart attacks involve discomfort in the center of the chest that lasts more than a few minutes, or that goes away and comes back. It can feel like uncomfortable pressure, squeezing, fullness, or pain. ? Discomfort in other areas of the upper body. Symptoms can include pain or discomfort in one or both arms, the back, neck, jaw, or stomach. ? Shortness of breath with or without chest discomfort. ? Other signs may include breaking out in a cold sweat, nausea, or lightheadedness. Don't wait more than five minutes to call [address]! Fast action can save your life. Calling 911 is almost always the fastest way to get lifesaving treatment. Emergency Medical Services staff can begin treatment when they arrive  up to an hour sooner than if someone gets to the hospital by car. The discharge information has been reviewed with the patient. The patient verbalized understanding. Discharge medications reviewed with the patient and appropriate educational materials and side effects teaching were provided. ___________________________________________________________________________________________________________________________________ [employer] Announcement We are excited to announce that we are making your provider's discharge notes available to you in [employer] You will see these notes when they are completed and signed by the physician that discharged you from your recent hospital stay. If you have any questions or concerns about any information you see in [employer], please call the Health Information Department where you were seen or reach out to your Primary Care Provider for more information about your plan of care. Introducing Son Gee As a [facility] patient, I wanted to make you aware of our electronic visit tool called Son [employer]ee. [facility] 24/7 allows you to connect within minutes with a medical provider 24 hours a day, seven days a week via a mobile device or tablet or logging into a secure website from your computer. You can access Son [employer] from anywhere in the [place].  
 
A virtual visit might be right for you when you have a simple condition and feel like you just dont want to get out of bed, or cant get away from work for an appointment, when your regular [facility] provider is not available (evenings, weekends or holidays), or when youre out of town and need minor care. Electronic visits cost only $49 and if the [employer] 24/7 provider determines a prescription is needed to treat your condition, one can be electronically transmitted to a nearby pharmacy*. Please take a moment to enroll today if you have not already done so. The enrollment process is free and takes just a few minutes. To enroll, please download the [employer] 24/7 keyon to your tablet or phone, or visit [email or web address] to enroll on your computer. And, as an [address] [place] [address] patient with a [employer] account, the results of your visits will be scanned into your electronic medical record and your primary care provider will be able to view the scanned results. We urge you to continue to see your regular [employer] provider for your ongoing medical care. And while your primary care provider may not be the one available when you seek a Nextt virtual visit, the peace of mind you get from getting a real diagnosis real time can be priceless. For more information on Nextt, view our Frequently Asked Questions (FAQs) at [email or web address]. Sincerely, 
 
Keke Russo MD 
Chief Medical Officer [address] *:  certain medications cannot be prescribed via Nextt Unresulted Labs-Please follow up with your PCP about these lab tests Order Current Status CULTURE, BLOOD Preliminary result CULTURE, BLOOD Preliminary result Providers Seen During Your Hospitalization Provider Specialty Primary office phone Tima Harmon MD Emergency Medicine [phone] Karen Baxter MD Family Practice [phone] Latesha Enriquez MD [facility] [phone] Your Primary Care Physician (PCP) Primary Care Physician Office Phone Office Fax 0860 Hayley Holley,[facility], [address] [phone] You are allergic to the following Allergen Reactions Latex, Natural Rubber Rash Doxycycline Hives Recent Documentation Height Weight Breastfeeding? BMI OB Status Smoking Status 1.422 m 43.1 kg No 21.3 kg/m2 Having regular periods Never Smoker Emergency Contacts Name Discharge Info Relation Home Work Mobile Charles Lambert DISCHARGE CAREGIVER [3] Mother [14] [phone] CrtaJosselyn Ruiz CAREGIVER [3] Sister [23] [phone] Patient Belongings The following personal items are in your possession at time of discharge: 
  Dental Appliances: None  Visual Aid: None      Home Medications: None   Jewelry: Body Piercing  Clothing: Footwear, Pants, Socks, Undergarments    Other Valuables: [employer] Discharge Instructions Attachments/References MEFS - LAXATIVES, STIMULANT (SUPPOSITORY) (DULCOLAX, BISCOLAX, GENTLE LAXATIVE, [facility] SUPPOSITORIES) - (INTO THE RECTUM) (ENGLISH) FAMOTIDINE (BY MOUTH) (ENGLISH) ONDANSETRON (BY MOUTH, INTO THE MOUTH) (ENGLISH) LAXATIVE, STOOL SOFTENERS (BY MOUTH) (ENGLISH) CIPROFLOXACIN (BY MOUTH) (ENGLISH) OXYCODONE/ACETAMINOPHEN (BY MOUTH) (ENGLISH) PYELONEPHRITIS (ENGLISH) Patient Handouts Laxatives, Stimulant (Suppository) (Dulcolax, Biscolax, Gentle Laxative, [facility] Suppositories) - (Into the rectum) Why this medicine is used:  
Treats occasional constipation by making bowel movements regular. Also treats constipation after surgery or childbirth. Contact a nurse or doctor right away if you have: 
· Nausea or vomiting, bleeding from your rectum, dark, tarry stools · Sweating or seizures · Lightheadedness or fainting Common side effects: · Gas pains or bloating · Mild stomach cramps or discomfort © 2017 [facility] Information is for End User's use only and may not be sold, redistributed or otherwise used for commercial purposes. Famotidine (By mouth) Famotidine (grz-HT-nd-shasta) Treats ulcers, gastroesophageal reflux disease (GERD), and conditions that cause the stomach to produce too much stomach acid. Also treats heartburn caused by acid indigestion. Brand Name(s): Acid Controller, Acid Reducer, [facility] Acid Reducer, Good Sense Acid Reducer, Heartburn Relief, Leader Acid Reducer, Pepcid, Pepcid AC, Quality Choice Acid Controller, [facility] Acid Reducer, [facility] Famotidine Acid Reducer, TopCare Acid Reducer There may be other brand names for this medicine. When This Medicine Should Not Be Used: You should not use this medicine if you have had an allergic reaction to famotidine or to similar medicines such as ranitidine (Zantac®), cimetidine (Tagamet®), or nizatidine (Axid®). How to Use This Medicine:  
Tablet, Chewable Tablet, Dissolving Tablet, Liquid · Your doctor will tell you how much medicine to use. Do not use more than directed. · Follow the instructions on the medicine label if you are using this medicine without a prescription. · The chewable tablet must be chewed completely before you swallow it. · If you are using the disintegrating tablet, make sure your hands are dry before you handle the tablet. Do not open the blister pack that contains the tablet until you are ready to take it. Remove the tablet from the blister pack by peeling back the foil, then taking the tablet out. Do not push the tablet through the foil. Place the tablet in your mouth. It should melt within 2 minutes. Swallow after the tablet has melted. · Shake the oral liquid medicine for 5 to 10 seconds before each use. Measure the medicine with a marked measuring spoon or medicine cup. If a dose is missed: · Take a dose as soon as you remember. If it is almost time for your next dose, wait until then and take a regular dose. Do not take extra medicine to make up for a missed dose. How to Store and Dispose of This Medicine: · Store the medicine in a closed container at room temperature, away from heat, moisture, and direct light. Do not freeze the oral liquid. · Ask your pharmacist, doctor, or health caregiver about the best way to dispose of any outdated medicine or medicine no longer needed. Throw away any unused oral liquid that is more than 3month old. · Keep all medicine out of the reach of children. Never share your medicine with anyone. Drugs and Foods to Avoid: Ask your doctor or pharmacist before using any other medicine, including over-the-counter medicines, vitamins, and herbal products. Warnings While Using This Medicine: · Make sure your doctor knows if you are pregnant or breastfeeding, or if you have kidney disease or liver disease. · This medicine might contain phenylalanine (aspartame). This is only a concern if you have a disorder called phenylketonuria (a problem with amino acids). If you have this condition, talk to your doctor before using this medicine. · Call your doctor if your symptoms do not improve or if they get worse. Possible Side Effects While Using This Medicine:  
Call your doctor right away if you notice any of these side effects: · Allergic reaction: Itching or hives, swelling in your face or hands, swelling or tingling in your mouth or throat, chest tightness, trouble breathing · Blistering, peeling, or red skin rash. · Dark-colored urine or pale stools. · Fast, pounding, or uneven heartbeat. · Fever, chills, cough, sore throat, and body aches. · Seizures. · Unusual bleeding, bruising, or weakness. · Yellowing of your skin or the whites of your eyes. If you notice these less serious side effects, talk with your doctor: · Constipation, diarrhea, or upset stomach. · Headache or dizziness. · Nausea or vomiting. If you notice other side effects that you think are caused by this medicine, tell your doctor. Call your doctor for medical advice about side effects. You may report side effects to FDA at 6-365-AEL-3449 © 2017 [facility] Information is for End User's use only and may not be sold, redistributed or otherwise used for commercial purposes. The above information is an  only. It is not intended as medical advice for individual conditions or treatments. Talk to your doctor, nurse or pharmacist before following any medical regimen to see if it is safe and effective for you. Ondansetron (By mouth, Into the mouth) Ondansetron (on-DAN-se-ashwin) Prevents nausea and vomiting. Brand Name(s): Zofran, Zofran ODT, [place] There may be other brand names for this medicine. When This Medicine Should Not Be Used: This medicine is not right for everyone. Do not use it if you had an allergic reaction to ondansetron. How to Use This Medicine: Thin Sheet, Liquid, Tablet, Dissolving Tablet · Your doctor will tell you how much medicine to use. Do not use more than directed. · Measure the oral liquid medicine with a marked measuring spoon, oral syringe, or medicine cup. · To use the disintegrating tablet:  
¨ Do not open the blister pack that contains the tablet until you are ready to take it. ¨ Make sure your hands are dry. Peel back the foil, then remove the tablet from the blister pack. Do not push the tablet through the foil. ¨ Place the tablet on top of your tongue where it will dissolve in seconds. After the tablet has melted, swallow or take a sip of water. · To use the soluble film: ¨ Make sure your hands are clean and dry. ¨ Fold the pouch along the dotted line. ¨ While still folded, tear the pouch carefully along the edge. Remove the film from the pouch. ¨ Place the film on top of your tongue. It will dissolve in 4 to 20 seconds. Do not chew or swallow the film whole. ¨ After the film has dissolved, you may swallow with or without water. · Read and follow the patient instructions that come with this medicine. Talk to your doctor or pharmacist if you have any questions. · Missed dose: Take a dose as soon as you remember. If it is almost time for your next dose, wait until then and take a regular dose. Do not take extra medicine to make up for a missed dose. · Store the medicine in a closed container at room temperature, away from heat, moisture, and direct light. Keep the soluble film in the foil pouch until you ready to use it. Drugs and Foods to Avoid: Ask your doctor or pharmacist before using any other medicine, including over-the-counter medicines, vitamins, and herbal products. · Do not use this medicine together with apomorphine. · Some medicines may affect how ondansetron works. Tell your doctor if you are using tramadol, diuretics (water pills), or any other medicine for nausea and vomiting. Warnings While Using This Medicine: · Tell your doctor if you are pregnant or breastfeeding, or if you have liver disease, congestive heart failure, heart rhythm problems (such as prolonged QT interval, slow heartbeat), low magnesium or potassium levels, stomach or bowel problems, or phenylketonuria (PKU). · This medicine may cause heart rhythm problems (such as QT prolongation). · This medicine may make you dizzy. Do not drive or do anything else that could be dangerous until you know how this medicine affects you. · Keep all medicine out of the reach of children. Never share your medicine with anyone. Possible Side Effects While Using This Medicine:  
Call your doctor right away if you notice any of these side effects: · Allergic reaction: Itching or hives, swelling in your face or hands, swelling or tingling in your mouth or throat, chest tightness, trouble breathing · Fainting, dizziness, or lightheadedness · Fast, pounding, or uneven heartbeat · Trouble breathing If you notice these less serious side effects, talk with your doctor: · Constipation or diarrhea 
· Headache · Tiredness or weakness If you notice other side effects that you think are caused by this medicine, tell your doctor. Call your doctor for medical advice about side effects. You may report side effects to FDA at 7-486-SOO-3370 © 2017 [facility] Information is for End User's use only and may not be sold, redistributed or otherwise used for commercial purposes. The above information is an  only. It is not intended as medical advice for individual conditions or treatments. Talk to your doctor, nurse or pharmacist before following any medical regimen to see if it is safe and effective for you. Laxative, Stool Softeners (By mouth) Treats constipation by helping you have a bowel movement. Brand Name(s): Col-Rite, Colace, Colace Clear, DSS, Diocto, Diocto Liquid, Doc-Q-Lace, Docuprene, Docusil, Dok, Dulcolax, Fleet Sof-Lax, [facility] Docusate Calcium, Good [facility] Stool Softener, [facility] Stool Softener Extra Strength There may be other brand names for this medicine. When This Medicine Should Not Be Used: You should not use this medicine if you have severe stomach pain, nausea, or vomiting. Stool softeners should not be used if you have severe stomach pain and do not know the cause. How to Use This Medicine:  
Capsule, Tablet, Liquid, Liquid Filled Capsule · Your doctor will tell you how much medicine to use. Do not use more than directed. · Follow the instructions on the medicine label if you are using this medicine without a prescription. · Drink 6 to 8 glasses of water daily while using any laxative. · To make the oral liquid taste better, you may mix it with one-half glass of milk or fruit juice. · Measure the oral liquid medicine with a marked measuring spoon, oral syringe, medicine cup, or medicine dropper. If a dose is missed: · Use the missed dose as soon as possible. · If you do not remember the missed dose until the next day, skip the missed dose and go back to your regular dosing schedule. · You should not use two doses at the same time. How to Store and Dispose of This Medicine: · Store the medicine in a tightly closed container at room temperature, away from heat and moisture. Do not store liquid-filled capsules in the refrigerator. · Keep all medicine out of the reach of children. Drugs and Foods to Avoid: Ask your doctor or pharmacist before using any other medicine, including over-the-counter medicines, vitamins, and herbal products. · You should not use mineral oil while you are using a stool softener. · You should not use a stool softener within 2 hours before or after taking any other medicines. Laxatives can keep other medicines from working correctly. Warnings While Using This Medicine: · If you are pregnant or breastfeeding, talk to your doctor before taking this medicine. · Do not give laxatives to children under 10years old unless you talk to your doctor. · You should not use this laxative for longer than 1 week unless approved by your doctor. Laxatives may be habit-forming and can harm your bowels if you use them too long. · Stool softeners usually work in 1 to 2 days, but for some people, results can take as long as 3 to 5 days. Possible Side Effects While Using This Medicine: If you notice these less serious side effects, talk with your doctor: · Nausea · Sore throat · Skin rash If you notice other side effects that you think are caused by this medicine, tell your doctor. Call your doctor for medical advice about side effects. You may report side effects to FDA at 2-486-FDA-1578 © 2017 [address] Information is for End User's use only and may not be sold, redistributed or otherwise used for commercial purposes. The above information is an  only. It is not intended as medical advice for individual conditions or treatments. Talk to your doctor, nurse or pharmacist before following any medical regimen to see if it is safe and effective for you. Ciprofloxacin (By mouth) Ciprofloxacin (xom-ahf-LIOB-a-sin) Treats infections and plague. This medicine is a quinolone antibiotic. Brand Name(s): Cipro There may be other brand names for this medicine. When This Medicine Should Not Be Used: This medicine is not right for everyone. Do not use it if you had an allergic reaction to ciprofloxacin or to similar medicines. How to Use This Medicine:  
Liquid, Tablet, Long Acting Tablet · Your doctor will tell you how much medicine to use. Do not use more than directed. Take this medicine at the same time each day. · You may take this medicine with or without food. Do not take this medicine with only a source of calcium, including milk, yogurt, or juice that contains added calcium. You may have foods or drinks that contain calcium as part of a larger meal. 
· Swallow the extended-release tablet whole. Do not crush, break, or chew it. · Oral liquid: Shake for at least 15 seconds just before each use. The liquid has small beads floating in it. Do not chew the beads when you drink the liquid. Measure the oral liquid medicine with a marked measuring spoon, oral syringe, or medicine cup. · Tablet: Swallow whole. Do not break, crush, or chew it. · Drink extra fluids so you will urinate more often and help prevent kidney problems. · Take all of the medicine in your prescription to clear up your infection, even if you feel better after the first few doses. · This medicine should come with a Medication Guide. Ask your pharmacist for a copy if you do not have one. · Missed dose: Take a dose as soon as you remember. If it is almost time for your next dose, wait until then and take a regular dose.  Do not take extra medicine to make up for a missed dose. · Store the medicine in a closed container at room temperature, away from heat, moisture, and direct light. Throw away any leftover liquid medicine after 14 days. Drugs and Foods to Avoid: Ask your doctor or pharmacist before using any other medicine, including over-the-counter medicines, vitamins, and herbal products. · Do not use this medicine together with tizanidine. · Some foods and medicines can affect how ciprofloxacin works. Tell your doctor if you are using any of the following: ¨ Clozapine, cyclosporine, duloxetine, lidocaine, methotrexate, olanzapine, pentoxifylline, phenytoin, probenecid, ropinirole, sildenafil, theophylline, zolpidem ¨ Antibiotic (including azithromycin, clarithromycin, erythromycin) ¨ Blood thinner (including warfarin) ¨ Diabetes medicine (including glimepiride, glyburide) ¨ Medicine for depression or mental illness ¨ Medicine for heart rhythm problems (including amiodarone, procainamide, quinidine, sotalol) ¨ NSAID pain medicine (including aspirin, celecoxib, diclofenac, ibuprofen, naproxen) ¨ Steroid medicine (including hydrocortisone, methylprednisolone, prednisone) · Take ciprofloxacin at least 2 hours before or 6 hours after you take didanosine buffered tablets for oral suspension or the pediatric powder for oral suspension, sucralfate, or antacids, multivitamins, or other products containing aluminum, magnesium, lanthanum, sevelamer, iron, or zinc. 
· This medicine slows the digestion of caffeine, so it might affect you for longer than normal. 
Warnings While Using This Medicine: · Tell your doctor if you are pregnant or breastfeeding, or if you have kidney disease, liver disease, diabetes, heart disease, myasthenia gravis, or a history of heart rhythm problems (including prolonged QT interval), nerve problems, or seizures.  Tell your doctor if you have ever had tendon or joint problems, including rheumatoid arthritis, or if you have received a transplant. · This medicine may cause the following problems: 
¨ Tendinitis and tendon rupture (which may happen after treatment ends) ¨ Liver damage ¨ Nerve damage in the arms or legs ¨ Heart rhythm changes ¨ Changes in blood sugar levels · This medicine may make you dizzy, drowsy, or lightheaded. Do not drive or do anything else that could be dangerous until you know how this medicine affects you. · This medicine can cause diarrhea. Call your doctor if the diarrhea becomes severe, does not stop, or is bloody. Do not take any medicine to stop diarrhea until you have talked to your doctor. Diarrhea can occur 2 months or more after you stop taking this medicine. · This medicine may make your skin more sensitive to sunlight. Wear sunscreen. Do not use sunlamps or tanning beds. · Call your doctor if your symptoms do not improve or if they get worse. · Keep all medicine out of the reach of children. Never share your medicine with anyone. Possible Side Effects While Using This Medicine:  
Call your doctor right away if you notice any of these side effects: · Allergic reaction: Itching or hives, swelling in your face or hands, swelling or tingling in your mouth or throat, chest tightness, trouble breathing · Blistering, peeling, red skin rash · Dark urine, pale stools, nausea, vomiting, loss of appetite, stomach pain, yellow skin or eyes · Diarrhea which may contain blood · Fainting, dizziness, or lightheadedness · Fast, slow, or uneven heartbeat · Numbness, tingling, weakness, or burning pain in your hands, arms, legs, or feet · Pain, stiffness, swelling, or bruises around your ankle, leg, shoulder, or other joints · Seizures, severe headache, unusual thoughts or behaviors, trouble sleeping, feeling anxious, confused, or depressed, seeing, hearing, or feeling things that are not there · Unusual bleeding, bruising, or weakness If you notice other side effects that you think are caused by this medicine, tell your doctor. Call your doctor for medical advice about side effects. You may report side effects to FDA at 4-942-FDA-0939 © 2017 [address] Information is for End User's use only and may not be sold, redistributed or otherwise used for commercial purposes. The above information is an  only. It is not intended as medical advice for individual conditions or treatments. Talk to your doctor, nurse or pharmacist before following any medical regimen to see if it is safe and effective for you. Oxycodone/Acetaminophen (By mouth) Acetaminophen (h-hmuu-o-MIN-oh-fen), Oxycodone Hydrochloride (sx-r-WAB-done emerson-droe-KLOR-raphael) Treats moderate to moderately severe pain. This medicine is a narcotic pain reliever. Brand Name(s): Endocet, Percocet, Primlev, Xartemis XR There may be other brand names for this medicine. When This Medicine Should Not Be Used: This medicine is not right for everyone. Do not use it if you had an allergic reaction to acetaminophen or oxycodone, or if you have serious breathing problems or paralytic ileus. How to Use This Medicine:  
Capsule, Liquid, Tablet, Long Acting Tablet · Your doctor will tell you how much medicine to use. Do not use more than directed. · An overdose can be dangerous. Follow directions carefully so you do not get too much medicine at one time. · Oral liquid: Measure the oral liquid medicine with a marked measuring spoon, oral syringe, or medicine cup. · Swallow the extended-release tablet whole. Do not crush, break, or chew it. Do not lick or wet the tablet before placing it in your mouth. Do not give this medicine through a feeding tube. · This medicine should come with a Medication Guide. Ask your pharmacist for a copy if you do not have one. · Missed dose: If you miss a dose of this medicine, skip the missed dose and go back to your regular dosing schedule. Do not double doses. · Store the medicine in a closed container at room temperature, away from heat, moisture, and direct light. Ask your pharmacist about the best way to dispose of medicine you do not use. Drugs and Foods to Avoid: Ask your doctor or pharmacist before using any other medicine, including over-the-counter medicines, vitamins, and herbal products. · Do not use Xartemis XR if you are using or have used an MAO inhibitor in the past 14 days. · Some medicines can affect how this medicine works. Tell your doctor if you are using any of the following: ¨ Carbamazepine, erythromycin, ketoconazole, lamotrigine, mirtazapine, naltrexone, phenytoin, propranolol, rifampin, ritonavir, tramadol, trazodone, or zidovudine ¨ Birth control pills ¨ Diuretic (water pill) ¨ Medicine to treat depression ¨ Phenothiazine medicine ¨ Triptan medicine to treat migraine headaches · Do not drink alcohol while you are using this medicine. Acetaminophen can damage your liver, and alcohol can increase this risk. Do not take acetaminophen without asking your doctor if you have 3 or more drinks of alcohol every day. · Tell your doctor if you use anything else that makes you sleepy. Some examples are allergy medicine, narcotic pain medicine, and alcohol. Tell your doctor if you are using buprenorphine, butorphanol, nalbuphine, pentazocine, a benzodiazepine, or a muscle relaxer. Warnings While Using This Medicine: · Tell your doctor if you are pregnant or breastfeeding, or if you have kidney disease, liver disease, heart disease, low blood pressure, breathing problems or lung disease (such as asthma, COPD), thyroid problems, Jamie disease, pancreas or gallbladder problems, prostate problems, trouble urinating, or a stomach problems, or a history of head injury or brain damage, seizures, or alcohol or drug abuse. Tell your doctor if you are allergic to codeine. · This medicine may cause the following problems: 
¨ High risk of overdose, which can lead to death ¨ Respiratory depression (serious breathing problem that can be life-threatening) ¨ Liver problems ¨ Serious skin reactions ¨ Serotonin syndrome (when used with certain medicines) · This medicine may make you dizzy or drowsy. Do not drive or do anything that could be dangerous until you know how this medicine affects you. Sit or lie down if you feel dizzy. Stand up carefully. · This medicine contains acetaminophen. Read the labels of all other medicines you are using to see if they also contain acetaminophen, or ask your doctor or pharmacist. Rosaura March not use more than 4 grams (4,000 milligrams) total of acetaminophen in one day. · This medicine can be habit-forming. Do not use more than your prescribed dose. Call your doctor if you think your medicine is not working. · Do not stop using this medicine suddenly. Your doctor will need to slowly decrease your dose before you stop it completely. · This medicine could cause infertility. Talk with your doctor before using this medicine if you plan to have children. · This medicine may cause constipation, especially with long-term use. Ask your doctor if you should use a laxative to prevent and treat constipation. · Keep all medicine out of the reach of children. Never share your medicine with anyone. Possible Side Effects While Using This Medicine:  
Call your doctor right away if you notice any of these side effects: · Allergic reaction: Itching or hives, swelling in your face or hands, swelling or tingling in your mouth or throat, chest tightness, trouble breathing · Anxiety, restlessness, fast heartbeat, fever, muscle spasms, twitching, diarrhea, seeing or hearing things that are not there · Blistering, peeling, red skin rash · Blue lips, fingernails, or skin · Dark urine or pale stools, loss of appetite, stomach pain, yellow skin or eyes · Extreme weakness, shallow breathing, uneven heartbeat, seizures, sweating, or cold or clammy skin · Severe confusion, lightheadedness, dizziness, or fainting · Severe constipation, nausea, or vomiting · Trouble breathing or slow breathing If you notice these less serious side effects, talk with your doctor:  
· Headache · Mild constipation, nausea, or vomiting · Mild sleepiness or drowsiness If you notice other side effects that you think are caused by this medicine, tell your doctor. Call your doctor for medical advice about side effects. You may report side effects to FDA at 3-782-SPV-3757 © 2017 [address]  Information is for End User's use only and may not be sold, redistributed or otherwise used for commercial purposes. The above information is an  only. It is not intended as medical advice for individual conditions or treatments. Talk to your doctor, nurse or pharmacist before following any medical regimen to see if it is safe and effective for you. Kidney Infection: Care Instructions Your Care Instructions A kidney infection (pyelonephritis) is a type of urinary tract infection, or UTI. Most UTIs are bladder infections. Kidney infections tend to make people much sicker than bladder infections do. A kidney infection is also more serious because it can cause lasting damage if it is not treated quickly. Follow-up care is a key part of your treatment and safety. Be sure to make and go to all appointments, and call your doctor if you are having problems. It's also a good idea to know your test results and keep a list of the medicines you take. How can you care for yourself at home? · Take your antibiotics as directed. Do not stop taking them just because you feel better. You need to take the full course of antibiotics. · Drink plenty of water, enough so that your urine is light yellow or clear like water. This may help wash out bacteria that are causing the infection. If you have kidney, heart, or liver disease and have to limit fluids, talk with your doctor before you increase the amount of fluids you drink. · Urinate often. Try to empty your bladder each time. · To relieve pain, take a hot shower or lay a heating pad (set on low) over your lower belly. Never go to sleep with a heating pad in place. Put a thin cloth between the heating pad and your skin. To help prevent kidney infections · Drink plenty of water each day. This helps you urinate often, which clears bacteria from your system. If you have kidney, heart, or liver disease and have to limit fluids, talk with your doctor before you increase the amount of fluids you drink. · Urinate when you have the urge. Do not hold your urine for a long time. Urinate before you go to sleep. · If you have symptoms of a bladder infection, such as burning when you urinate or having to urinate often, call your doctor so you can treat the problem before it gets worse. If you do not treat a bladder infection quickly, it can spread to the kidney. · Men should keep the tip of the penis clean. If you are a woman, keep these ideas in mind: · Urinate right after you have sex. · Change sanitary pads often. Avoid douches, feminine hygiene sprays, and other feminine hygiene products that have deodorants. · After going to the bathroom, wipe from front to back. When should you call for help? Call your doctor now or seek immediate medical care if: 
? · You have symptoms that a kidney infection is getting worse. These may include: 
¨ Pain or burning when you urinate. ¨ A frequent need to urinate without being able to pass much urine. ¨ Pain in the flank, which is just below the rib cage and above the waist on either side of the back. ¨ Blood in the urine. ¨ A fever. ? · You are vomiting or nauseated. ? Watch closely for changes in your health, and be sure to contact your doctor if: 
? · You do not get better as expected. Where can you learn more? Go to http://gerardo-stan.info/. Enter S576 in the search box to learn more about \"Kidney Infection: Care Instructions. \" Current as of: May 12, 2017 Content Version: 11.4 © 2006-2017 [employer]. Care instructions adapted under license by [employer] (which disclaims liability or warranty for this information). If you have questions about a medical condition or this instruction, always ask your healthcare professional. [address] any warranty or liability for your use of this information. Please provide this summary of care documentation to your next provider. Signatures-by signing, you are acknowledging that this After Visit Summary has been reviewed with you and you have received a copy. Patient Signature:  ____________________________________________________________ Date:  ____________________________________________________________  
  
Silvina Richardson Provider Signature:  ____________________________________________________________ Date:  ____________________________________________________________

## 2018-05-02 NOTE — ED NOTES
Pt resting on stretcher with no acute distress noted. Will continue to monitor pt and await further orders. Pt requested ice chips and was given them. Pt reattached to monitor.

## 2018-05-02 NOTE — IP AVS SNAPSHOT
[address] Patient: Lu Gore MRN: [id number] [facility]:[birth year] A check palma indicates which time of day the medication should be taken. My Medications START taking these medications Instructions Each Dose to Equal  
 Morning Noon Evening Bedtime  
 bisacodyl 10 mg suppository Commonly known as:  DULCOLAX Your last dose was: Your next dose is: Insert 10 mg into rectum daily as needed. Indications: constipation 10 mg  
    
   
   
   
  
 ciprofloxacin HCl 500 mg tablet Commonly known as:  CIPRO Your last dose was: Your next dose is: Take 1 Tab by mouth every twelve (12) hours for 17 doses. 500 mg  
    
   
   
   
  
 docusate sodium 100 mg capsule Commonly known as:  Lylia Sella Your last dose was: Your next dose is: Take 1 Cap by mouth two (2) times a day for 90 days. 100 mg  
    
   
   
   
  
 famotidine 20 mg tablet Commonly known as:  PEPCID Your last dose was: Your next dose is: Take 1 Tab by mouth two (2) times a day. 20 mg  
    
   
   
   
  
 ondansetron 4 mg disintegrating tablet Commonly known as:  ZOFRAN ODT Your last dose was: Your next dose is: Take 1 Tab by mouth every six (6) hours as needed. 4 mg CHANGE how you take these medications Instructions Each Dose to Equal  
 Morning Noon Evening Bedtime  
 oxyCODONE-acetaminophen 5-325 mg per tablet Commonly known as:  PERCOCET What changed:   
- when to take this 
- reasons to take this Your last dose was: Your next dose is: Take 1 Tab by mouth every six (6) hours as needed. Max Daily Amount: 4 Tabs. 1 Tab CONTINUE taking these medications  Instructions Each Dose to Equal  
 Morning Noon Evening Bedtime  
 amitriptyline 25 mg tablet Commonly known as:  ELAVIL Your last dose was: Your next dose is: Take 25 mg by mouth nightly. 25 mg  
    
   
   
   
  
  
STOP taking these medications   
 ibuprofen 600 mg tablet Commonly known as:  MOTRIN  
   
  
 phenazopyridine 200 mg tablet Commonly known as:  PYRIDIUM SEROquel 50 mg tablet Generic drug:  QUEtiapine  
   
  
 trimethoprim-sulfamethoxazole 160-800 mg per tablet Commonly known as:  BACTRIM DS Where to Get Your Medications Information on where to get these meds will be given to you by the nurse or doctor. ! Ask your nurse or doctor about these medications  
  bisacodyl 10 mg suppository  
 ciprofloxacin HCl 500 mg tablet  
 docusate sodium 100 mg capsule  
 famotidine 20 mg tablet  
 ondansetron 4 mg disintegrating tablet  
 oxyCODONE-acetaminophen 5-325 mg per tablet

## 2018-05-02 NOTE — Clinical Note
Status[de-identified] Inpatient [101] Type of Bed: Medical [8] Inpatient Hospitalization Certified Necessary for the Following Reasons: 3. Patient receiving treatment that can only be provided in an inpatient setting (further clarification in H&P documentation) Admitting Diagnosis: Bacteremia [790. 7. ICD-9-CM] Admitting Physician: Britta Baird [[id number]] Attending Physician: Thiago Bishop Estimated Length of Stay: 2 Midnights Discharge Plan[de-identified] Home with Office Follow-up

## 2018-05-02 NOTE — ED NOTES
Pt resting on stretcher with no acute distress noted and stable vital signs. IVF bolus continued. Will continue to monitor pt and await further orders.

## 2018-05-02 NOTE — ED TRIAGE NOTES
Pt. States \"I keep having sharp pains all up in here that started yesterday\" refers to left LLQ and flank pain. Pt.  Also c/o N/V.

## 2018-05-02 NOTE — DISCHARGE INSTRUCTIONS
Kidney Infection: Care Instructions  Your Care Instructions    A kidney infection (pyelonephritis) is a type of urinary tract infection, or UTI. Most UTIs are bladder infections. Kidney infections tend to make people much sicker than bladder infections do. A kidney infection is also more serious because it can cause lasting damage if it is not treated quickly. Follow-up care is a key part of your treatment and safety. Be sure to make and go to all appointments, and call your doctor if you are having problems. It's also a good idea to know your test results and keep a list of the medicines you take. How can you care for yourself at home? · Take your antibiotics as directed. Do not stop taking them just because you feel better. You need to take the full course of antibiotics. · Drink plenty of water, enough so that your urine is light yellow or clear like water. This may help wash out bacteria that are causing the infection. If you have kidney, heart, or liver disease and have to limit fluids, talk with your doctor before you increase the amount of fluids you drink. · Urinate often. Try to empty your bladder each time. · To relieve pain, take a hot shower or lay a heating pad (set on low) over your lower belly. Never go to sleep with a heating pad in place. Put a thin cloth between the heating pad and your skin. To help prevent kidney infections  · Drink plenty of water each day. This helps you urinate often, which clears bacteria from your system. If you have kidney, heart, or liver disease and have to limit fluids, talk with your doctor before you increase the amount of fluids you drink. · Urinate when you have the urge. Do not hold your urine for a long time. Urinate before you go to sleep. · If you have symptoms of a bladder infection, such as burning when you urinate or having to urinate often, call your doctor so you can treat the problem before it gets worse.  If you do not treat a bladder infection quickly, it can spread to the kidney. · Men should keep the tip of the penis clean. If you are a woman, keep these ideas in mind:  · Urinate right after you have sex. · Change sanitary pads often. Avoid douches, feminine hygiene sprays, and other feminine hygiene products that have deodorants. · After going to the bathroom, wipe from front to back. When should you call for help? Call your doctor now or seek immediate medical care if:  ? · You have symptoms that a kidney infection is getting worse. These may include:  ¨ Pain or burning when you urinate. ¨ A frequent need to urinate without being able to pass much urine. ¨ Pain in the flank, which is just below the rib cage and above the waist on either side of the back. ¨ Blood in the urine. ¨ A fever. ? · You are vomiting or nauseated. ? Watch closely for changes in your health, and be sure to contact your doctor if:  ? · You do not get better as expected. Where can you learn more? Go to http://gerardo-stan.info/. Enter Z694 in the search box to learn more about \"Kidney Infection: Care Instructions. \"  Current as of: May 12, 2017  Content Version: 11.4  © 4093-5566 [employer]. Care instructions adapted under license by [employer] (which disclaims liability or warranty for this information). If you have questions about a medical condition or this instruction, always ask your healthcare professional. [place] [address] any warranty or liability for your use of this information. Urinary Tract Infection in Women: Care Instructions  Your Care Instructions    A urinary tract infection, or UTI, is a general term for an infection anywhere between the kidneys and the urethra (where urine comes out). Most UTIs are bladder infections. They often cause pain or burning when you urinate. UTIs are caused by bacteria and can be cured with antibiotics.  Be sure to complete your treatment so that the infection goes away. Follow-up care is a key part of your treatment and safety. Be sure to make and go to all appointments, and call your doctor if you are having problems. It's also a good idea to know your test results and keep a list of the medicines you take. How can you care for yourself at home? · Take your antibiotics as directed. Do not stop taking them just because you feel better. You need to take the full course of antibiotics. · Drink extra water and other fluids for the next day or two. This may help wash out the bacteria that are causing the infection. (If you have kidney, heart, or liver disease and have to limit fluids, talk with your doctor before you increase your fluid intake.)  · Avoid drinks that are carbonated or have caffeine. They can irritate the bladder. · Urinate often. Try to empty your bladder each time. · To relieve pain, take a hot bath or lay a heating pad set on low over your lower belly or genital area. Never go to sleep with a heating pad in place. To prevent UTIs  · Drink plenty of water each day. This helps you urinate often, which clears bacteria from your system. (If you have kidney, heart, or liver disease and have to limit fluids, talk with your doctor before you increase your fluid intake.)  · Urinate when you need to. · Urinate right after you have sex. · Change sanitary pads often. · Avoid douches, bubble baths, feminine hygiene sprays, and other feminine hygiene products that have deodorants. · After going to the bathroom, wipe from front to back. When should you call for help? Call your doctor now or seek immediate medical care if:  ? · Symptoms such as fever, chills, nausea, or vomiting get worse or appear for the first time. ? · You have new pain in your back just below your rib cage. This is called flank pain. ? · There is new blood or pus in your urine. ? · You have any problems with your antibiotic medicine. ? Watch closely for changes in your health, and be sure to contact your doctor if:  ? · You are not getting better after taking an antibiotic for 2 days. ? · Your symptoms go away but then come back. Where can you learn more? Go to http://gerardo-stan.info/. Enter J807 in the search box to learn more about \"Urinary Tract Infection in Women: Care Instructions. \"  Current as of: May 12, 2017  Content Version: 11.4  © 3499-5701 [employer]. Care instructions adapted under license by [employer] (which disclaims liability or warranty for this information). If you have questions about a medical condition or this instruction, always ask your healthcare professional. [address] any warranty or liability for your use of this information.

## 2018-05-02 NOTE — ED PROVIDER NOTES
EMERGENCY DEPARTMENT HISTORY AND PHYSICAL EXAM    10:19 PM      Date: 5/1/2018  Patient Name: Agata Garcia    History of Presenting Illness     Chief Complaint   Patient presents with    Abdominal Pain    Nausea    Vomiting    Chills         History Provided By: Patient    Chief Complaint: flank pain  Duration: 1 Days  Timing:  Acute  Location: left flank  Quality: Aching  Severity: 10 out of 10  Modifying Factors: none  Associated Symptoms: nausea, vomiting      Additional History (Context): Agata Garcia is a 33 y.o. female with No significant past medical history who presents with 1 day of acute onset 10/10 aching left flank pain along with nausea, vomiting. Pt reports that she has had pain and vomiting since yesterday with 10 episodes of vomiting. Pt reports that she has had a similar episode in the past that was diagnosed as pancreatitis. No other concerns or symptoms at this time. PCP: Adalberto Bradley MD    Current Facility-Administered Medications   Medication Dose Route Frequency Provider Last Rate Last Dose    oxyCODONE-acetaminophen (PERCOCET) 5-325 mg per tablet 1 Tab  1 Tab Oral NOW Erinn Mendez NP        phenazopyridine (PYRIDIUM) tablet 200 mg  200 mg Oral NOW Erinn Mendez NP        sodium chloride (NS) flush 5-10 mL  5-10 mL IntraVENous PRN Catie Barney DO         Current Outpatient Prescriptions   Medication Sig Dispense Refill    oxyCODONE-acetaminophen (PERCOCET) 5-325 mg per tablet Take 1 Tab by mouth every four (4) hours as needed for Pain for up to 20 doses. Max Daily Amount: 6 Tabs. 15 Tab 0    trimethoprim-sulfamethoxazole (BACTRIM DS) 160-800 mg per tablet Take 1 Tab by mouth two (2) times a day for 10 days. 20 Tab 0    phenazopyridine (PYRIDIUM) 200 mg tablet Take 1 Tab by mouth three (3) times daily for 2 days. 6 Tab 0    QUEtiapine (SEROQUEL) 50 mg tablet Take 50 mg by mouth nightly.  amitriptyline (ELAVIL) 75 mg tablet Take 25 mg by mouth nightly.  ibuprofen (MOTRIN) 600 mg tablet Take 1 Tab by mouth every six (6) hours as needed for Pain. 20 Tab 0       Past History     Past Medical History:  Past Medical History:   Diagnosis Date    Bipolar 1 disorder, depressed ([address]     Neurological disorder     Psychiatric disorder        Past Surgical History:  Past Surgical History:   Procedure Laterality Date    UPPER GI ENDOSCOPY,BIOPSY  3/29/2017            Family History:  No family history on file. Social History:  Social History   Substance Use Topics    Smoking status: Never Smoker    Smokeless tobacco: Never Used    Alcohol use 0.5 oz/week     1 Standard drinks or equivalent per week      Comment: 5-6 shots/week       Allergies: Allergies   Allergen Reactions    Latex, Natural Rubber Rash    Doxycycline Hives         Review of Systems       Review of Systems   Constitutional: Negative. Negative for chills and diaphoresis. HENT: Negative. Negative for congestion, rhinorrhea and sore throat. Eyes: Negative. Negative for pain, discharge and redness. Respiratory: Negative. Negative for cough, chest tightness, shortness of breath and wheezing. Cardiovascular: Negative. Negative for chest pain. Gastrointestinal: Positive for nausea and vomiting. Negative for abdominal pain, constipation and diarrhea. Genitourinary: Negative. Negative for dysuria, flank pain, frequency, hematuria and urgency. Musculoskeletal: Negative for neck pain. Positive for left flank pain   Skin: Negative. Negative for rash. Neurological: Negative. Negative for syncope, weakness, numbness and headaches. Psychiatric/Behavioral: Negative. All other systems reviewed and are negative.         Physical Exam     Visit Vitals    /87    Pulse 92    Temp 99.3 °F (37.4 °C)    Resp 22    Ht 4' 8\" (1.422 m)    Wt 43.1 kg (95 lb)    LMP 04/25/2018    SpO2 99%    BMI 21.3 kg/m2         Physical Exam   Constitutional: She is oriented to person, place, and time. She appears well-developed and well-nourished. Non-toxic appearance. She does not have a sickly appearance. She does not appear ill. No distress. HENT:   Head: Normocephalic and atraumatic. Mouth/Throat: Oropharynx is clear and moist. No oropharyngeal exudate. Eyes: Conjunctivae and EOM are normal. Pupils are equal, round, and reactive to light. No scleral icterus. Neck: Normal range of motion. Neck supple. No hepatojugular reflux and no JVD present. No tracheal deviation present. No thyromegaly present. Cardiovascular: Normal rate, regular rhythm, S1 normal, S2 normal, normal heart sounds, intact distal pulses and normal pulses. Exam reveals no gallop, no S3 and no S4. No murmur heard. Pulses:       Radial pulses are 2+ on the right side, and 2+ on the left side. Dorsalis pedis pulses are 2+ on the right side, and 2+ on the left side. Pulmonary/Chest: Effort normal and breath sounds normal. No respiratory distress. She has no decreased breath sounds. She has no wheezes. She has no rhonchi. She has no rales. Abdominal: Soft. Normal appearance and bowel sounds are normal. She exhibits no distension and no mass. There is no hepatosplenomegaly. There is no tenderness. There is no rigidity, no rebound, no guarding, no CVA tenderness, no tenderness at McBurney's point and negative Landry's sign. Musculoskeletal: Normal range of motion. She exhibits no edema or tenderness. Back:    Lymphadenopathy:        Head (right side): No submental, no submandibular, no preauricular and no occipital adenopathy present. Head (left side): No submental, no submandibular, no preauricular and no occipital adenopathy present. She has no cervical adenopathy. Right: No supraclavicular adenopathy present. Left: No supraclavicular adenopathy present. Neurological: She is alert and oriented to person, place, and time.  She has normal strength and normal reflexes. She is not disoriented. No cranial nerve deficit or sensory deficit. Coordination and gait normal. GCS eye subscore is 4. GCS verbal subscore is 5. GCS motor subscore is 6. Grossly intact    Skin: Skin is warm, dry and intact. No rash noted. She is not diaphoretic. Psychiatric: She has a normal mood and affect. Her speech is normal and behavior is normal. Judgment and thought content normal. Cognition and memory are normal.   Nursing note and vitals reviewed. Diagnostic Study Results     Labs -  Recent Results (from the past 12 hour(s))   URINALYSIS W/ RFLX MICROSCOPIC    Collection Time: 05/01/18 10:07 PM   Result Value Ref Range    Color DARK YELLOW      Appearance CLOUDY      Specific gravity 1.023 1.005 - 1.030      pH (UA) 5.5 5.0 - 8.0      Protein 100 (A) NEG mg/dL    Glucose NEGATIVE  NEG mg/dL    Ketone 80 (A) NEG mg/dL    Bilirubin SMALL (A) NEG      Blood LARGE (A) NEG      Urobilinogen 1.0 0.2 - 1.0 EU/dL    Nitrites POSITIVE (A) NEG      Leukocyte Esterase MODERATE (A) NEG     HCG URINE, QL    Collection Time: 05/01/18 10:07 PM   Result Value Ref Range    HCG urine, QL NEGATIVE  NEG     URINE MICROSCOPIC ONLY    Collection Time: 05/01/18 10:07 PM   Result Value Ref Range    WBC TOO NUMEROUS TO COUNT 0 - 4 /hpf    RBC 11 to 20 0 - 5 /hpf    Epithelial cells 3+ 0 - 5 /lpf    Bacteria 4+ (A) NEG /hpf    Mucus 4+ (A) NEG /lpf   CBC WITH AUTOMATED DIFF    Collection Time: 05/01/18 10:08 PM   Result Value Ref Range    WBC 13.2 4.6 - 13.2 K/uL    RBC 4.23 4.20 - 5.30 M/uL    HGB 13.9 12.0 - 16.0 g/dL    HCT 40.4 35.0 - 45.0 %    MCV 95.5 74.0 - 97.0 FL    MCH 32.9 24.0 - 34.0 PG    MCHC 34.4 31.0 - 37.0 g/dL    RDW 12.8 11.6 - 14.5 %    PLATELET 338 198 - 344 K/uL    MPV 10.4 9.2 - 11.8 FL    NEUTROPHILS 80 (H) 40 - 73 %    LYMPHOCYTES 10 (L) 21 - 52 %    MONOCYTES 10 3 - 10 %    EOSINOPHILS 0 0 - 5 %    BASOPHILS 0 0 - 2 %    ABS. NEUTROPHILS 10.5 (H) 1.8 - 8.0 K/UL    ABS.  LYMPHOCYTES 1.3 0.9 - 3.6 K/UL    ABS. MONOCYTES 1.4 (H) 0.05 - 1.2 K/UL    ABS. EOSINOPHILS 0.0 0.0 - 0.4 K/UL    ABS. BASOPHILS 0.0 0.0 - 0.1 K/UL    DF AUTOMATED     METABOLIC PANEL, BASIC    Collection Time: 05/01/18 10:08 PM   Result Value Ref Range    Sodium 134 (L) 136 - 145 mmol/L    Potassium 3.5 3.5 - 5.5 mmol/L    Chloride 99 (L) 100 - 108 mmol/L    CO2 27 21 - 32 mmol/L    Anion gap 8 3.0 - 18 mmol/L    Glucose 111 (H) 74 - 99 mg/dL    BUN 10 7.0 - 18 MG/DL    Creatinine 0.90 0.6 - 1.3 MG/DL    BUN/Creatinine ratio 11 (L) 12 - 20      GFR est AA >60 >60 ml/min/1.73m2    GFR est non-AA >60 >60 ml/min/1.73m2    Calcium 8.7 8.5 - 10.1 MG/DL   LIPASE    Collection Time: 05/01/18 10:08 PM   Result Value Ref Range    Lipase 50 (L) 73 - 393 U/L   HEPATIC FUNCTION PANEL    Collection Time: 05/01/18 10:08 PM   Result Value Ref Range    Protein, total 8.0 6.4 - 8.2 g/dL    Albumin 3.5 3.4 - 5.0 g/dL    Globulin 4.5 (H) 2.0 - 4.0 g/dL    A-G Ratio 0.8 0.8 - 1.7      Bilirubin, total 1.5 (H) 0.2 - 1.0 MG/DL    Bilirubin, direct 0.7 (H) 0.0 - 0.2 MG/DL    Alk. phosphatase 211 (H) 45 - 117 U/L    AST (SGOT) 35 15 - 37 U/L    ALT (SGPT) 46 13 - 56 U/L   POC LACTIC ACID    Collection Time: 05/01/18 10:49 PM   Result Value Ref Range    Lactic Acid (POC) 0.6 0.4 - 2.0 mmol/L       Radiologic Studies -   CT ABD PELV W CONT   Final Result      XR ABD ACUTE W 1 V CHEST   Final Result            Medical Decision Making   I am the first provider for this patient. I reviewed the vital signs, available nursing notes, past medical history, past surgical history, family history and social history. Vital Signs-Reviewed the patient's vital signs.     Records Reviewed: Nursing Notes and Old Medical Records (Time of Review: 10:19 PM)    ED Course: Progress Notes, Reevaluation, and Consults:      Provider Notes (Medical Decision Making):  MDM  Number of Diagnoses or Management Options  Diagnosis management comments: DDX: Gastritis, gerd, peptic ulcer disease, cholecystitis, pancreatitis, gastroenteritis, hepatitis, constipation related pain, appendicitis pain, diverticulitis, urinary tract infection, obstruction, abdominal wall pain, atypical cardiac (ami or anginal pain), referred pain from pulmonary process (pneumonia, empyema), ectopic pregnancy,  or combination of the above versus many other processes. Diagnosis       Clinical Impression:    Disposition:   10:33 PM : Pt care transferred to NP Shanae Deshpande ,ED provider. History of patient complaint(s), available diagnostic reports and current treatment plan has been discussed thoroughly. Bedside rounding on patient occured : yes . Intended disposition of patient : TBD  Pending diagnostics reports and/or labs (please list): septic workup      _______________________________    Attestations:  Elda [address] [place] [address] acting as a scribe for and in the presence of Kassie Minor DO      May 01, 2018 at 10:24 PM       Provider Attestation:      I personally performed the services described in the documentation, reviewed the documentation, as recorded by the scribe in my presence, and it accurately and completely records my words and actions.  May 01, 2018 at 10:24 PM - Maura Barney DO    _______________________________               Angie So:  Patient Vitals for the past 12 hrs:   Temp Pulse Resp BP SpO2   05/02/18 0030 - 92 22 143/87 99 %   05/01/18 2345 - 87 19 128/84 98 %   05/01/18 2300 - 86 22 117/64 98 %   05/01/18 2215 - 76 21 130/77 99 %   05/01/18 2212 99.3 °F (37.4 °C) - - - -   05/01/18 2124 (!) 101.5 °F (38.6 °C) 96 18 112/76 99 %       Medications ordered:   Medications   sodium chloride (NS) flush 5-10 mL (not administered)   oxyCODONE-acetaminophen (PERCOCET) 5-325 mg per tablet 1 Tab (not administered)   phenazopyridine (PYRIDIUM) tablet 200 mg (not administered)   ondansetron (ZOFRAN) injection 4 mg (4 mg IntraVENous Given 5/1/18 3964)   sodium chloride 0.9 % bolus infusion 1,000 mL (0 mL IntraVENous IV Completed 5/1/18 8954)   sodium chloride 0.9 % bolus infusion 1,293 mL (0 mL/kg × 43.1 kg IntraVENous IV Completed 5/1/18 2450)   cefepime (MAXIPIME) 2 g in sterile water (preservative free) 10 mL IV syringe (2 g IntraVENous Given 5/1/18 8877)   iopamidol (ISOVUE 300) 61 % contrast injection  mL (57 mL IntraVENous Given 5/1/18 3537)         Lab findings:  Recent Results (from the past 12 hour(s))   URINALYSIS W/ RFLX MICROSCOPIC    Collection Time: 05/01/18 10:07 PM   Result Value Ref Range    Color DARK YELLOW      Appearance CLOUDY      Specific gravity 1.023 1.005 - 1.030      pH (UA) 5.5 5.0 - 8.0      Protein 100 (A) NEG mg/dL    Glucose NEGATIVE  NEG mg/dL    Ketone 80 (A) NEG mg/dL    Bilirubin SMALL (A) NEG      Blood LARGE (A) NEG      Urobilinogen 1.0 0.2 - 1.0 EU/dL    Nitrites POSITIVE (A) NEG      Leukocyte Esterase MODERATE (A) NEG     HCG URINE, QL    Collection Time: 05/01/18 10:07 PM   Result Value Ref Range    HCG urine, QL NEGATIVE  NEG     URINE MICROSCOPIC ONLY    Collection Time: 05/01/18 10:07 PM   Result Value Ref Range    WBC TOO NUMEROUS TO COUNT 0 - 4 /hpf    RBC 11 to 20 0 - 5 /hpf    Epithelial cells 3+ 0 - 5 /lpf    Bacteria 4+ (A) NEG /hpf    Mucus 4+ (A) NEG /lpf   CBC WITH AUTOMATED DIFF    Collection Time: 05/01/18 10:08 PM   Result Value Ref Range    WBC 13.2 4.6 - 13.2 K/uL    RBC 4.23 4.20 - 5.30 M/uL    HGB 13.9 12.0 - 16.0 g/dL    HCT 40.4 35.0 - 45.0 %    MCV 95.5 74.0 - 97.0 FL    MCH 32.9 24.0 - 34.0 PG    MCHC 34.4 31.0 - 37.0 g/dL    RDW 12.8 11.6 - 14.5 %    PLATELET 839 379 - 755 K/uL    MPV 10.4 9.2 - 11.8 FL    NEUTROPHILS 80 (H) 40 - 73 %    LYMPHOCYTES 10 (L) 21 - 52 %    MONOCYTES 10 3 - 10 %    EOSINOPHILS 0 0 - 5 %    BASOPHILS 0 0 - 2 %    ABS. NEUTROPHILS 10.5 (H) 1.8 - 8.0 K/UL    ABS. LYMPHOCYTES 1.3 0.9 - 3.6 K/UL    ABS. MONOCYTES 1.4 (H) 0.05 - 1.2 K/UL    ABS. EOSINOPHILS 0.0 0.0 - 0.4 K/UL    ABS. BASOPHILS 0.0 0.0 - 0.1 K/UL    DF AUTOMATED     METABOLIC PANEL, BASIC    Collection Time: 05/01/18 10:08 PM   Result Value Ref Range    Sodium 134 (L) 136 - 145 mmol/L    Potassium 3.5 3.5 - 5.5 mmol/L    Chloride 99 (L) 100 - 108 mmol/L    CO2 27 21 - 32 mmol/L    Anion gap 8 3.0 - 18 mmol/L    Glucose 111 (H) 74 - 99 mg/dL    BUN 10 7.0 - 18 MG/DL    Creatinine 0.90 0.6 - 1.3 MG/DL    BUN/Creatinine ratio 11 (L) 12 - 20      GFR est AA >60 >60 ml/min/1.73m2    GFR est non-AA >60 >60 ml/min/1.73m2    Calcium 8.7 8.5 - 10.1 MG/DL   LIPASE    Collection Time: 05/01/18 10:08 PM   Result Value Ref Range    Lipase 50 (L) 73 - 393 U/L   HEPATIC FUNCTION PANEL    Collection Time: 05/01/18 10:08 PM   Result Value Ref Range    Protein, total 8.0 6.4 - 8.2 g/dL    Albumin 3.5 3.4 - 5.0 g/dL    Globulin 4.5 (H) 2.0 - 4.0 g/dL    A-G Ratio 0.8 0.8 - 1.7      Bilirubin, total 1.5 (H) 0.2 - 1.0 MG/DL    Bilirubin, direct 0.7 (H) 0.0 - 0.2 MG/DL    Alk. phosphatase 211 (H) 45 - 117 U/L    AST (SGOT) 35 15 - 37 U/L    ALT (SGPT) 46 13 - 56 U/L   POC LACTIC ACID    Collection Time: 05/01/18 10:49 PM   Result Value Ref Range    Lactic Acid (POC) 0.6 0.4 - 2.0 mmol/L        CT ABD PELV W CONT   Final Result      XR ABD ACUTE W 1 V CHEST   Final Result        EXAMINATION: CT abdomen/pelvis with IV contrast     INDICATION: Abdominal pain, left-sided     COMPARISON: 3/27/2017     TECHNIQUE: CT of the abdomen and pelvis performed following 57 cc IV Isovue-300  with multiplanar reformations. All CT scans at this facility are performed using  dose optimization technique as appropriate to a performed exam, to include  automated exposure control, adjustment of the mA and/or kV according to patient  size (including appropriate matching first site specific examinations), or use  of iterative reconstruction technique.     FINDINGS:     Lower thorax: Unremarkable.     Liver: A few calcified granulomata.  No suspicious lesions.     Spleen: A few calcified granulomata. No suspicious lesions.     Pancreas: Normal.     Biliary tree: Gallbladder unremarkable. No ductal dilatation.     Adrenal glands: Right and left adrenal glands are normal.     Kidneys: Right kidney normal without hydronephrosis. Left kidney with patchy  hypoenhancement in the mid to upper kidney, without hydronephrosis.     Bladder: Collapsed.     Reproductive: Uterus unremarkable.     Gastrointestinal: Stomach is unremarkable. Small bowel loops are nondilated. The  colon is nondilated. Appendix is unremarkable.     Vessels: Major vessels are unremarkable.     Mesentery/nodes: No free air. Mild perirectal/presacral fat stranding. No  retroperitoneal or pelvic adenopathy by size criteria.     Miscellaneous: Superficial soft tissues unremarkable.     Bones: No acute osseous findings.     IMPRESSION  IMPRESSION:     1. Left kidney patchy hypoenhancement, nonspecific, suspicious for  pyelonephritis. Recommend clinical correlation.     2. Mild perirectal/presacral fat stranding, similar to recent priors,  nonspecific. Correlate clinically. Progress notes, Consult notes or additional Procedure notes:      Pt with no clinical signs of sepsis, lactate negative, no tachycardia, wbc within normal limits. Pt with significant uti and findings of mild pyleo on ct. Pt hydrated and given cefapime in ed and will be discharged to home on abx and pain med. Reevaluation of patient:   I have reassessed the patient. Patient is feeling better and is asking to go home    Disposition:    Diagnosis:   1. Acute cystitis without hematuria    2.  Pyelonephritis        Disposition: to Home      Follow-up Information     Follow up With Details Comments [address], [place] In 2 days  [address] [facility] [address] [phone]             Patient's Medications   Start Taking    OXYCODONE-ACETAMINOPHEN (PERCOCET) 5-325 MG PER TABLET    Take 1 Tab by mouth every four (4) hours as needed for Pain for up to 20 doses. Max Daily Amount: 6 Tabs. PHENAZOPYRIDINE (PYRIDIUM) 200 MG TABLET    Take 1 Tab by mouth three (3) times daily for 2 days. TRIMETHOPRIM-SULFAMETHOXAZOLE (BACTRIM DS) 160-800 MG PER TABLET    Take 1 Tab by mouth two (2) times a day for 10 days. Continue Taking    AMITRIPTYLINE (ELAVIL) 75 MG TABLET    Take 25 mg by mouth nightly. IBUPROFEN (MOTRIN) 600 MG TABLET    Take 1 Tab by mouth every six (6) hours as needed for Pain. QUETIAPINE (SEROQUEL) 50 MG TABLET    Take 50 mg by mouth nightly. These Medications have changed    No medications on file   Stop Taking    No medications on file       Return to the ER if you are unable to obtain referral as directed. Tabatha Grider  results have been reviewed with her. She has been counseled regarding her diagnosis, treatment, and plan. She verbally conveys understanding and agreement of the signs, symptoms, diagnosis, treatment and prognosis and additionally agrees to follow up as discussed. She also agrees with the care-plan and conveys that all of her questions have been answered. I have also provided discharge instructions for her that include: educational information regarding their diagnosis and treatment, and list of reasons why they would want to return to the ED prior to their follow-up appointment, should her condition change.         Usman DORANTESP-C,FNP-C

## 2018-05-03 PROBLEM — N12 PYELONEPHRITIS: Status: ACTIVE | Noted: 2018-05-03

## 2018-05-03 PROBLEM — R78.81 BACTEREMIA: Status: ACTIVE | Noted: 2018-05-03

## 2018-05-03 LAB
ANION GAP SERPL CALC-SCNC: 7 MMOL/L (ref 3–18)
APPEARANCE UR: ABNORMAL
BASOPHILS # BLD: 0 K/UL (ref 0–0.1)
BASOPHILS NFR BLD: 0 % (ref 0–2)
BILIRUB UR QL: ABNORMAL
BUN SERPL-MCNC: 5 MG/DL (ref 7–18)
BUN/CREAT SERPL: 8 (ref 12–20)
CALCIUM SERPL-MCNC: 8 MG/DL (ref 8.5–10.1)
CHLORIDE SERPL-SCNC: 100 MMOL/L (ref 100–108)
CO2 SERPL-SCNC: 28 MMOL/L (ref 21–32)
COLOR UR: ABNORMAL
CREAT SERPL-MCNC: 0.65 MG/DL (ref 0.6–1.3)
DIFFERENTIAL METHOD BLD: ABNORMAL
EOSINOPHIL # BLD: 0 K/UL (ref 0–0.4)
EOSINOPHIL NFR BLD: 0 % (ref 0–5)
EPITH CASTS URNS QL MICRO: NORMAL /LPF (ref 0–5)
ERYTHROCYTE [DISTWIDTH] IN BLOOD BY AUTOMATED COUNT: 12.6 % (ref 11.6–14.5)
GLUCOSE SERPL-MCNC: 83 MG/DL (ref 74–99)
GLUCOSE UR STRIP.AUTO-MCNC: NEGATIVE MG/DL
HCT VFR BLD AUTO: 33 % (ref 35–45)
HGB BLD-MCNC: 11.2 G/DL (ref 12–16)
HGB UR QL STRIP: ABNORMAL
KETONES UR QL STRIP.AUTO: 80 MG/DL
LEUKOCYTE ESTERASE UR QL STRIP.AUTO: ABNORMAL
LYMPHOCYTES # BLD: 1 K/UL (ref 0.9–3.6)
LYMPHOCYTES NFR BLD: 12 % (ref 21–52)
MCH RBC QN AUTO: 31.8 PG (ref 24–34)
MCHC RBC AUTO-ENTMCNC: 33.9 G/DL (ref 31–37)
MCV RBC AUTO: 93.8 FL (ref 74–97)
MONOCYTES # BLD: 0.9 K/UL (ref 0.05–1.2)
MONOCYTES NFR BLD: 12 % (ref 3–10)
NEUTS SEG # BLD: 6 K/UL (ref 1.8–8)
NEUTS SEG NFR BLD: 76 % (ref 40–73)
NITRITE UR QL STRIP.AUTO: POSITIVE
PH UR STRIP: 6 [PH] (ref 5–8)
PLATELET # BLD AUTO: 130 K/UL (ref 135–420)
PMV BLD AUTO: 9.9 FL (ref 9.2–11.8)
POTASSIUM SERPL-SCNC: 3.2 MMOL/L (ref 3.5–5.5)
PROT UR STRIP-MCNC: NEGATIVE MG/DL
RBC # BLD AUTO: 3.52 M/UL (ref 4.2–5.3)
RBC #/AREA URNS HPF: NORMAL /HPF (ref 0–5)
SODIUM SERPL-SCNC: 135 MMOL/L (ref 136–145)
SP GR UR REFRACTOMETRY: 1.01 (ref 1–1.03)
UROBILINOGEN UR QL STRIP.AUTO: 2 EU/DL (ref 0.2–1)
WBC # BLD AUTO: 7.9 K/UL (ref 4.6–13.2)
WBC URNS QL MICRO: NORMAL /HPF (ref 0–4)

## 2018-05-03 PROCEDURE — 74011250636 HC RX REV CODE- 250/636: Performed by: FAMILY MEDICINE

## 2018-05-03 PROCEDURE — 87040 BLOOD CULTURE FOR BACTERIA: CPT | Performed by: FAMILY MEDICINE

## 2018-05-03 PROCEDURE — 74011250637 HC RX REV CODE- 250/637: Performed by: FAMILY MEDICINE

## 2018-05-03 PROCEDURE — 80048 BASIC METABOLIC PNL TOTAL CA: CPT | Performed by: EMERGENCY MEDICINE

## 2018-05-03 PROCEDURE — 87086 URINE CULTURE/COLONY COUNT: CPT | Performed by: EMERGENCY MEDICINE

## 2018-05-03 PROCEDURE — 81001 URINALYSIS AUTO W/SCOPE: CPT | Performed by: EMERGENCY MEDICINE

## 2018-05-03 PROCEDURE — 74011000258 HC RX REV CODE- 258: Performed by: FAMILY MEDICINE

## 2018-05-03 PROCEDURE — 65270000029 HC RM PRIVATE

## 2018-05-03 PROCEDURE — 85025 COMPLETE CBC W/AUTO DIFF WBC: CPT | Performed by: EMERGENCY MEDICINE

## 2018-05-03 PROCEDURE — 74011250637 HC RX REV CODE- 250/637: Performed by: EMERGENCY MEDICINE

## 2018-05-03 PROCEDURE — 36415 COLL VENOUS BLD VENIPUNCTURE: CPT | Performed by: FAMILY MEDICINE

## 2018-05-03 PROCEDURE — 74011250636 HC RX REV CODE- 250/636: Performed by: EMERGENCY MEDICINE

## 2018-05-03 PROCEDURE — 74011000258 HC RX REV CODE- 258: Performed by: EMERGENCY MEDICINE

## 2018-05-03 RX ORDER — SODIUM CHLORIDE 9 MG/ML
75 INJECTION, SOLUTION INTRAVENOUS CONTINUOUS
Status: DISPENSED | OUTPATIENT
Start: 2018-05-03 | End: 2018-05-04

## 2018-05-03 RX ORDER — ONDANSETRON 2 MG/ML
4 INJECTION INTRAMUSCULAR; INTRAVENOUS
Status: COMPLETED | OUTPATIENT
Start: 2018-05-03 | End: 2018-05-03

## 2018-05-03 RX ORDER — AMITRIPTYLINE HYDROCHLORIDE 25 MG/1
25 TABLET, FILM COATED ORAL
Status: DISCONTINUED | OUTPATIENT
Start: 2018-05-03 | End: 2018-05-08 | Stop reason: HOSPADM

## 2018-05-03 RX ORDER — OXYCODONE AND ACETAMINOPHEN 5; 325 MG/1; MG/1
1 TABLET ORAL
Status: DISCONTINUED | OUTPATIENT
Start: 2018-05-03 | End: 2018-05-07

## 2018-05-03 RX ORDER — LEVOFLOXACIN 5 MG/ML
750 INJECTION, SOLUTION INTRAVENOUS EVERY 24 HOURS
Status: DISCONTINUED | OUTPATIENT
Start: 2018-05-03 | End: 2018-05-03

## 2018-05-03 RX ORDER — ACETAMINOPHEN 500 MG
1000 TABLET ORAL
Status: COMPLETED | OUTPATIENT
Start: 2018-05-03 | End: 2018-05-03

## 2018-05-03 RX ORDER — FENTANYL CITRATE 50 UG/ML
50 INJECTION, SOLUTION INTRAMUSCULAR; INTRAVENOUS
Status: COMPLETED | OUTPATIENT
Start: 2018-05-03 | End: 2018-05-03

## 2018-05-03 RX ORDER — ONDANSETRON 4 MG/1
4 TABLET, ORALLY DISINTEGRATING ORAL
Status: DISCONTINUED | OUTPATIENT
Start: 2018-05-03 | End: 2018-05-08 | Stop reason: HOSPADM

## 2018-05-03 RX ORDER — ENOXAPARIN SODIUM 100 MG/ML
40 INJECTION SUBCUTANEOUS EVERY 24 HOURS
Status: DISCONTINUED | OUTPATIENT
Start: 2018-05-03 | End: 2018-05-08 | Stop reason: HOSPADM

## 2018-05-03 RX ORDER — IBUPROFEN 600 MG/1
600 TABLET ORAL
Status: DISCONTINUED | OUTPATIENT
Start: 2018-05-03 | End: 2018-05-07

## 2018-05-03 RX ORDER — AMITRIPTYLINE HYDROCHLORIDE 25 MG/1
25 TABLET, FILM COATED ORAL
COMMUNITY
End: 2022-07-14

## 2018-05-03 RX ORDER — POTASSIUM CHLORIDE 20 MEQ/1
40 TABLET, EXTENDED RELEASE ORAL
Status: COMPLETED | OUTPATIENT
Start: 2018-05-03 | End: 2018-05-03

## 2018-05-03 RX ORDER — FAMOTIDINE 20 MG/1
20 TABLET, FILM COATED ORAL 2 TIMES DAILY
Status: DISCONTINUED | OUTPATIENT
Start: 2018-05-03 | End: 2018-05-08 | Stop reason: HOSPADM

## 2018-05-03 RX ORDER — MORPHINE SULFATE 4 MG/ML
2 INJECTION INTRAVENOUS
Status: DISCONTINUED | OUTPATIENT
Start: 2018-05-03 | End: 2018-05-07

## 2018-05-03 RX ORDER — SODIUM CHLORIDE 900 MG/100ML
INJECTION INTRAVENOUS
Status: DISPENSED
Start: 2018-05-03 | End: 2018-05-03

## 2018-05-03 RX ADMIN — ENOXAPARIN SODIUM 40 MG: 40 INJECTION SUBCUTANEOUS at 08:56

## 2018-05-03 RX ADMIN — OXYCODONE HYDROCHLORIDE AND ACETAMINOPHEN 1 TABLET: 5; 325 TABLET ORAL at 08:56

## 2018-05-03 RX ADMIN — VANCOMYCIN HYDROCHLORIDE 750 MG: 10 INJECTION, POWDER, LYOPHILIZED, FOR SOLUTION INTRAVENOUS at 12:06

## 2018-05-03 RX ADMIN — ONDANSETRON 4 MG: 2 INJECTION INTRAMUSCULAR; INTRAVENOUS at 02:52

## 2018-05-03 RX ADMIN — ONDANSETRON 4 MG: 4 TABLET, ORALLY DISINTEGRATING ORAL at 16:50

## 2018-05-03 RX ADMIN — FAMOTIDINE 20 MG: 20 TABLET, FILM COATED ORAL at 21:34

## 2018-05-03 RX ADMIN — SODIUM CHLORIDE 3.38 G: 900 INJECTION, SOLUTION INTRAVENOUS at 02:57

## 2018-05-03 RX ADMIN — FENTANYL CITRATE 50 MCG: 50 INJECTION INTRAMUSCULAR; INTRAVENOUS at 02:52

## 2018-05-03 RX ADMIN — ACETAMINOPHEN 1000 MG: 500 TABLET, FILM COATED ORAL at 03:29

## 2018-05-03 RX ADMIN — MORPHINE SULFATE 2 MG: 4 INJECTION INTRAVENOUS at 13:12

## 2018-05-03 RX ADMIN — SODIUM CHLORIDE 75 ML/HR: 900 INJECTION, SOLUTION INTRAVENOUS at 10:32

## 2018-05-03 RX ADMIN — OXYCODONE HYDROCHLORIDE AND ACETAMINOPHEN 1 TABLET: 5; 325 TABLET ORAL at 21:34

## 2018-05-03 RX ADMIN — POTASSIUM CHLORIDE 40 MEQ: 1500 TABLET, FILM COATED, EXTENDED RELEASE ORAL at 08:56

## 2018-05-03 RX ADMIN — SODIUM CHLORIDE 1000 MG: 900 INJECTION, SOLUTION INTRAVENOUS at 03:30

## 2018-05-03 RX ADMIN — SODIUM CHLORIDE 3.38 G: 900 INJECTION, SOLUTION INTRAVENOUS at 10:31

## 2018-05-03 RX ADMIN — SODIUM CHLORIDE 3.38 G: 900 INJECTION, SOLUTION INTRAVENOUS at 21:34

## 2018-05-03 RX ADMIN — SODIUM CHLORIDE 3.38 G: 900 INJECTION, SOLUTION INTRAVENOUS at 16:50

## 2018-05-03 RX ADMIN — MORPHINE SULFATE 2 MG: 4 INJECTION INTRAVENOUS at 23:38

## 2018-05-03 RX ADMIN — VANCOMYCIN HYDROCHLORIDE 750 MG: 10 INJECTION, POWDER, LYOPHILIZED, FOR SOLUTION INTRAVENOUS at 18:27

## 2018-05-03 RX ADMIN — AMITRIPTYLINE HYDROCHLORIDE 25 MG: 25 TABLET, FILM COATED ORAL at 21:34

## 2018-05-03 RX ADMIN — MORPHINE SULFATE 2 MG: 4 INJECTION INTRAVENOUS at 16:50

## 2018-05-03 NOTE — ROUTINE PROCESS
Bedside and Verbal shift change report given to [employer] (oncoming nurse) by Akil Wilkins (offgoing nurse). Report included the following information SBAR and Kardex.

## 2018-05-03 NOTE — ROUTINE PROCESS
Primary Nurse Don Neely RN and Juan Jose Payne RN performed a dual skin assessment on this patient No impairment noted  Madhav score is 23

## 2018-05-03 NOTE — ED PROVIDER NOTES
EMERGENCY DEPARTMENT HISTORY AND PHYSICAL EXAM    Date: 5/2/2018  Patient Name: Mamie Brown    History of Presenting Illness     Chief Complaint   Patient presents with    Other     positive blood cultures    Back Pain         History Provided By: Patient    Chief Complaint: positive blood cultures  Duration: 1 Days  Timing:  Acute  Location: left flank pain  Quality: Burning  Severity: Moderate  Modifying Factors: on Bactrim   Associated Symptoms: denies any other associated signs or symptoms      Additional History (Context): Mamie Brown is a 33 y.o. female with bipolar, pancreatitis, alcohol abuse, cocaine abuse who presents with phone call from me with positive blood cultures, anaerobic and aerobic bottles for gram positive cocci and gram negative rods. Still having pain in left flank. Snorts cocaine; denies IVD. PCP: Sachin Stock MD    Current Facility-Administered Medications   Medication Dose Route Frequency Provider Last Rate Last Dose    cefepime (MAXIPIME) 2 g in sterile water (preservative free) 10 mL IV syringe  2 g IntraVENous Q8H SHANDA Sarah        fentaNYL citrate (PF) injection 50 mcg  50 mcg IntraVENous NOW SHANDA Sarah        ondansetron (ZOFRAN) injection 4 mg  4 mg IntraVENous NOW SHANDA Sarah        piperacillin-tazobactam (ZOSYN) 3.375 g in 0.9% sodium chloride (MBP/ADV) 100 mL MBP  3.375 g IntraVENous NOW SHANDA Sarah        vancomycin (VANCOCIN) 1,000 mg in 0.9% sodium chloride (MBP/ADV) 250 mL adv  1,000 mg IntraVENous NOW SHANDA Sarah         Current Outpatient Prescriptions   Medication Sig Dispense Refill    oxyCODONE-acetaminophen (PERCOCET) 5-325 mg per tablet Take 1 Tab by mouth every four (4) hours as needed for Pain for up to 20 doses. Max Daily Amount: 6 Tabs. 15 Tab 0    trimethoprim-sulfamethoxazole (BACTRIM DS) 160-800 mg per tablet Take 1 Tab by mouth two (2) times a day for 10 days.  20 Tab 0    phenazopyridine (PYRIDIUM) 200 mg tablet Take 1 Tab by mouth three (3) times daily for 2 days. 6 Tab 0    amitriptyline (ELAVIL) 75 mg tablet Take 25 mg by mouth nightly.  QUEtiapine (SEROQUEL) 50 mg tablet Take 50 mg by mouth nightly.  ibuprofen (MOTRIN) 600 mg tablet Take 1 Tab by mouth every six (6) hours as needed for Pain. 20 Tab 0       Past History     Past Medical History:  Past Medical History:   Diagnosis Date    Bipolar 1 disorder, depressed ([address]     Cocaine abuse     Neurological disorder     Pancreatitis     Psychiatric disorder        Past Surgical History:  Past Surgical History:   Procedure Laterality Date    UPPER GI ENDOSCOPY,BIOPSY  3/29/2017            Family History:  History reviewed. No pertinent family history. Social History:  Social History   Substance Use Topics    Smoking status: Never Smoker    Smokeless tobacco: Never Used    Alcohol use 0.5 oz/week     1 Standard drinks or equivalent per week      Comment: 5-6 shots/week       Allergies: Allergies   Allergen Reactions    Latex, Natural Rubber Rash    Doxycycline Hives         Review of Systems   Review of Systems   Constitutional: Positive for fatigue. Negative for fever. HENT: Negative. Eyes: Negative. Respiratory: Negative. Cardiovascular: Negative. Gastrointestinal: Negative. Endocrine: Negative. Genitourinary: Positive for dysuria and flank pain. Skin: Negative. Allergic/Immunologic: Negative. Neurological: Negative. Hematological: Negative. Psychiatric/Behavioral: Negative. All other systems reviewed and are negative. All Other Systems Negative  Physical Exam     Vitals:    05/02/18 2346   BP: 107/70   Pulse: 90   Resp: 16   SpO2: 98%     Physical Exam   Constitutional: She is oriented to person, place, and time. She appears well-developed. HENT:   Head: Normocephalic and atraumatic. Eyes: Pupils are equal, round, and reactive to light. Neck: No JVD present. No tracheal deviation present.  No thyromegaly present. Cardiovascular: Normal rate, regular rhythm and normal heart sounds. Exam reveals no gallop and no friction rub. No murmur heard. Pulmonary/Chest: Effort normal and breath sounds normal. No stridor. No respiratory distress. She has no wheezes. She has no rales. She exhibits no tenderness. Abdominal: Soft. She exhibits no distension and no mass. There is no tenderness. There is no rebound and no guarding. Musculoskeletal: She exhibits tenderness. She exhibits no edema. Left flank TTP   Lymphadenopathy:     She has no cervical adenopathy. Neurological: She is alert and oriented to person, place, and time. Skin: Skin is warm and dry. No rash noted. No erythema. No pallor. Psychiatric: She has a normal mood and affect. Her behavior is normal. Thought content normal.   Nursing note and vitals reviewed. Diagnostic Study Results     Labs -   No results found for this or any previous visit (from the past 12 hour(s)). Radiologic Studies -   No orders to display     CT Results  (Last 48 hours)               05/01/18 2300  CT ABD PELV W CONT Final result    Impression:  IMPRESSION:       1. Left kidney patchy hypoenhancement, nonspecific, suspicious for   pyelonephritis. Recommend clinical correlation. 2. Mild perirectal/presacral fat stranding, similar to recent priors,   nonspecific. Correlate clinically. Narrative:  EXAMINATION: CT abdomen/pelvis with IV contrast       INDICATION: Abdominal pain, left-sided       COMPARISON: 3/27/2017       TECHNIQUE: CT of the abdomen and pelvis performed following 57 cc IV Isovue-300   with multiplanar reformations.  All CT scans at this facility are performed using   dose optimization technique as appropriate to a performed exam, to include   automated exposure control, adjustment of the mA and/or kV according to patient   size (including appropriate matching first site specific examinations), or use   of iterative reconstruction technique. FINDINGS:       Lower thorax: Unremarkable. Liver: A few calcified granulomata. No suspicious lesions. Spleen: A few calcified granulomata. No suspicious lesions. Pancreas: Normal.       Biliary tree: Gallbladder unremarkable. No ductal dilatation. Adrenal glands: Right and left adrenal glands are normal.       Kidneys: Right kidney normal without hydronephrosis. Left kidney with patchy   hypoenhancement in the mid to upper kidney, without hydronephrosis. Bladder: Collapsed. Reproductive: Uterus unremarkable. Gastrointestinal: Stomach is unremarkable. Small bowel loops are nondilated. The   colon is nondilated. Appendix is unremarkable. Vessels: Major vessels are unremarkable. Mesentery/nodes: No free air. Mild perirectal/presacral fat stranding. No   retroperitoneal or pelvic adenopathy by size criteria. Miscellaneous: Superficial soft tissues unremarkable. Bones: No acute osseous findings. CXR Results  (Last 48 hours)               05/01/18 2149  XR ABD ACUTE W 1 V CHEST Final result    Impression:  IMPRESSION:       1. No acute findings. Narrative:  EXAMINATION: Abdomen 2 views, chest single view       INDICATION: Abdominal pain       COMPARISON: CT 3/27/2017       FINDINGS:       Frontal view of the chest obtained. No consolidation. Mediastinal silhouette and   pulmonary vasculature unremarkable. No evidence of pneumothorax. No acute   osseous findings. Frontal supine and upright views of the abdomen obtained. Nonobstructive bowel   gas pattern. No evidence of free air. No suspicious calcifications. Probable   pelvic phleboliths. No acute osseous findings. Medical Decision Making   I am the first provider for this patient. I reviewed the vital signs, available nursing notes, past medical history, past surgical history, family history and social history.     Vital Signs-Reviewed the patient's vital signs. Records Reviewed: Nursing Notes and Previous Laboratory Studies    Procedures:  Procedures    Provider Notes (Medical Decision Making): cover for gram positive and negative culture results. Admit to PCP; [facility] MED RECONCILIATION:  Current Facility-Administered Medications   Medication Dose Route Frequency    cefepime (MAXIPIME) 2 g in sterile water (preservative free) 10 mL IV syringe  2 g IntraVENous Q8H    fentaNYL citrate (PF) injection 50 mcg  50 mcg IntraVENous NOW    ondansetron (ZOFRAN) injection 4 mg  4 mg IntraVENous NOW    piperacillin-tazobactam (ZOSYN) 3.375 g in 0.9% sodium chloride (MBP/ADV) 100 mL MBP  3.375 g IntraVENous NOW    vancomycin (VANCOCIN) 1,000 mg in 0.9% sodium chloride (MBP/ADV) 250 mL adv  1,000 mg IntraVENous NOW     Current Outpatient Prescriptions   Medication Sig    oxyCODONE-acetaminophen (PERCOCET) 5-325 mg per tablet Take 1 Tab by mouth every four (4) hours as needed for Pain for up to 20 doses. Max Daily Amount: 6 Tabs.  trimethoprim-sulfamethoxazole (BACTRIM DS) 160-800 mg per tablet Take 1 Tab by mouth two (2) times a day for 10 days.  phenazopyridine (PYRIDIUM) 200 mg tablet Take 1 Tab by mouth three (3) times daily for 2 days.  amitriptyline (ELAVIL) 75 mg tablet Take 25 mg by mouth nightly.  QUEtiapine (SEROQUEL) 50 mg tablet Take 50 mg by mouth nightly.  ibuprofen (MOTRIN) 600 mg tablet Take 1 Tab by mouth every six (6) hours as needed for Pain. Disposition:  admit      Follow-up Information     None          Current Discharge Medication List            Core Measures:    Critical Care Time:   Critical Care Time:   I have spent 30 minutes of critical care time involved in lab review, consultations with specialist, family decision-making, and documentation. During this entire length of time I was immediately available to the patient.     Critical Care:   The reason for providing this level of medical care for this critically ill patient was due a critical illness that impaired one or more vital organ systems such that there was a high probability of imminent or life threatening deterioration in the patients condition. This care involved high complexity decision making to assess, manipulate, and support vital system functions, to treat this degreee vital organ system failure and to prevent further life threatening deterioration of the patients condition. For Hospitalized Patients:    1. Hospitalization Decision Time:  The decision to hospitalize the patient was made by Dr. Marleni Padilla, and myself at 1:45a on 5/2/2018    2. Aspirin: Aspirin was not given because the patient did not present with a stroke at the time of their Emergency Department evaluation    Diagnosis     Clinical Impression:   1. Bacteremia    2.  Pyelonephritis

## 2018-05-03 NOTE — PROGRESS NOTES
met with patient, completed the initial Spiritual Assessment of the patient, and offered Pastoral Care, see flow sheets for interventions. Patient said she has been in pain. She is [place] and also has support from her family. Pastoral support provided. Patient does not have any Pentecostal/cultural needs that will affect patients preferences in health care. Chart reviewed. Chaplains will continue to follow and will provide pastoral care on an as needed/as requested basis. Bernardo Caraballo MDiv.   Board Certified [employer] [phone]

## 2018-05-03 NOTE — ROUTINE PROCESS
Bedside and Verbal shift change report given to ORLANDO Mcmahon (oncoming nurse) by Gregg Blum (offgoing nurse). Report included the following information SBAR, Kardex, Intake/Output, MAR and Recent Results.

## 2018-05-03 NOTE — PROGRESS NOTES
Problem: Pain  Goal: *Control of Pain  Outcome: Not Progressing Towards Goal  Pt continues to have complaints of pain and says she feels better after administration of morphine.  Will continue to monitor for pain adequacy

## 2018-05-03 NOTE — ROUTINE PROCESS
TRANSFER - OUT REPORT:    Verbal report given to Debbie PERRY(name) on Deepa Bardales  being transferred to [address] [place] [address](unit) for routine progression of care       Report consisted of patients Situation, Background, Assessment and   Recommendations(SBAR). Information from the following report(s) SBAR, ED Summary, Intake/Output, MAR and Recent Results was reviewed with the receiving nurse. Lines:   Peripheral IV 05/03/18 Left Antecubital (Active)   Site Assessment Clean, dry, & intact 5/3/2018  2:40 AM   Phlebitis Assessment 0 5/3/2018  2:40 AM   Infiltration Assessment 0 5/3/2018  2:40 AM   Dressing Status Clean, dry, & intact 5/3/2018  2:40 AM   Dressing Type Transparent 5/3/2018  2:40 AM   Hub Color/Line Status Pink;Flushed 5/3/2018  2:40 AM   Action Taken Blood drawn 5/3/2018  2:40 AM        Opportunity for questions and clarification was provided.       Patient transported with:   Registered Nurse

## 2018-05-03 NOTE — ED NOTES
Pt in ED on stretcher with c/o abnormal labs, pt states she came to ED last night b/c back pain. Pt reports still has back pain, and not feeling well. Pt alert and oriented x4, no acute distress noted.

## 2018-05-03 NOTE — PROGRESS NOTES
Reason for Admission:  Bacteremia/pyelonephritis                    RRAT Score:      4               Plan for utilizing home health:   N/A                       Likelihood of Readmission:  Low score/green zone                         Transition of Care Plan:   Home with family    Care Management Interventions  MyChart Signup: No  Physical Therapy Consult: No  Occupational Therapy Consult: No  Speech Therapy Consult: No  Current Support Network: Relative's Home  Confirm Follow Up Transport: Family  Plan discussed with Pt/Family/Caregiver: Yes      33 yr old admit with pyelonephritis. Patient states that she lives with her mother at home and was able to verify her home address. Patient states that she is independent at home with all her ADL's and denies DME/Home /[facility]. Patient verbalized no needs at this time, will remain available.

## 2018-05-03 NOTE — ROUTINE PROCESS
TRANSFER - IN REPORT:    Verbal report received from Damien(name) on Kait Lopez  being received from Er(unit) for routine progression of care      Report consisted of patients Situation, Background, Assessment and   Recommendations(SBAR). Information from the following report(s) SBAR and Kardex was reviewed with the receiving nurse. Opportunity for questions and clarification was provided. Assessment completed upon patients arrival to unit and care assumed.

## 2018-05-03 NOTE — H&P
History & Physical    Patient: Susan Salinas MRN: [id number]  CSN: [id number]    YOB: 1990  Age: 33 y.o. Sex: female      DOA: 5/2/2018    Chief Complaint:   Chief Complaint   Patient presents with    Other     positive blood cultures    Back Pain          HPI:     Susan Salinas is a 33 y.o.  female who was called back to the ER after blood cultures returned positive for admission with IV antibiotics. Patient originally presented to ER on 5/1/18 for left flank pain, nauea, vomiting, similar to prior history of pancreatitis. CT Abd and exam with findings of mild pyelonephritis, patient given IV cefepime in ER and discharged home with po bactrim and pain medications. Patient reports taking medication as prescribed, still having significant left flank pain, having hesitency with urination but no burning, frequency or pain. Patient started on vancomycin and zosyn and admitted for IV antibiotic therapy pending culture results. Past Medical History:   Diagnosis Date    Bipolar 1 disorder, depressed ([address]     Cocaine abuse     Neurological disorder     Pancreatitis     Psychiatric disorder        Past Surgical History:   Procedure Laterality Date    HX PELVIC LAPAROSCOPY      for perforated IUD removal    UPPER GI ENDOSCOPY,BIOPSY  3/29/2017            History reviewed. No pertinent family history.     Social History     Social History    Marital status: SINGLE     Spouse name: N/A    Number of children: N/A    Years of education: N/A     Social History Main Topics    Smoking status: Never Smoker    Smokeless tobacco: Never Used    Alcohol use 0.5 oz/week     1 Standard drinks or equivalent per week      Comment: 5-6 shots/week    Drug use: 7.00 per week     Special: Marijuana    Sexual activity: Yes     Partners: Male     Birth control/ protection: None     Other Topics Concern    None     Social History Narrative       Prior to Admission medications    Medication Sig Start Date End Date Taking? Authorizing Provider   amitriptyline (ELAVIL) 25 mg tablet Take 25 mg by mouth nightly. Yes Historical Provider   oxyCODONE-acetaminophen (PERCOCET) 5-325 mg per tablet Take 1 Tab by mouth every four (4) hours as needed for Pain for up to 20 doses. Max Daily Amount: 6 Tabs. 5/2/18  Yes Pardeep Lindsey NP   trimethoprim-sulfamethoxazole (BACTRIM DS) 160-800 mg per tablet Take 1 Tab by mouth two (2) times a day for 10 days. 5/2/18 5/12/18 Yes Paredep Lindsey NP   phenazopyridine (PYRIDIUM) 200 mg tablet Take 1 Tab by mouth three (3) times daily for 2 days. 5/2/18 5/4/18 Yes Pardeep Lindsey NP   QUEtiapine (SEROQUEL) 50 mg tablet Take 50 mg by mouth nightly. Phys Other, MD   ibuprofen (MOTRIN) 600 mg tablet Take 1 Tab by mouth every six (6) hours as needed for Pain. 4/5/18   Carla Medley NP       Allergies   Allergen Reactions    Latex, Natural Rubber Rash    Doxycycline Hives       Review of Systems  GENERAL: Patient alert, awake and oriented times 3, able to communicate full sentences and not in distress. HEENT: No change in vision, no earache, tinnitus, sore throat or sinus congestion. NECK: No pain or stiffness. CARDIOVASCULAR: No chest pain or pressure. No palpitations. PULMONARY: No shortness of breath, cough or wheeze. GASTROINTESTINAL: Left lower quadrant pain radiating from left flank, + nausea, + vomiting resolved today. No diarrhea, melena or       bright red blood per rectum. GENITOURINARY: No urinary frequency, urgency, or dysuria. +hesitency and left flank pain. MUSCULOSKELETAL: No joint or muscle pain, no back pain, no recent trauma. DERMATOLOGIC: No rash, no itching, no lesions. ENDOCRINE: No polyuria, polydipsia, no heat or cold intolerance. HEMATOLOGICAL: No anemia or easy bruising or bleeding. NEUROLOGIC: No headache, seizures, numbness, tingling or weakness.    PSYCHIATRIC:  Hx of bipolar disorder, depression, anxiety feels controlled currently        Physical Exam:     Physical Exam:  Visit Vitals    BP 96/57 (BP 1 Location: Left arm, BP Patient Position: At rest)    Pulse 64    Temp 98.4 °F (36.9 °C)    Resp 17    Ht 4' 8\" (1.422 m)    Wt 43.1 kg (95 lb)    LMP 2018    SpO2 97%    Breastfeeding No    BMI 21.3 kg/m2      O2 Device: Room air    Temp (24hrs), Av.3 °F (37.4 °C), Min:98.4 °F (36.9 °C), Max:100.1 °F (37.8 °C)             General:  Alert, cooperative, no distress, appears stated age. Head:  Normocephalic, without obvious abnormality, atraumatic. Eyes:  Conjunctivae/corneas clear. PERRL, EOMs intact. Nose: Nares normal. No drainage or sinus tenderness. Throat: Lips, mucosa, and tongue normal.    Neck: Supple, symmetrical, trachea midline, no adenopathy   Back:   +L CVAT   Lungs:   Clear to auscultation bilaterally. Chest wall:  No tenderness or deformity. Heart:  Regular rate and rhythm, S1, S2 normal, no murmur, click, rub or gallop. Abdomen: Soft, Mild TTP LLQ. No rebound. Bowel sounds normal. No masses,  No organomegaly. Extremities: Extremities normal, atraumatic, no cyanosis or edema. Pulses: 2+ and symmetric all extremities. Skin: Skin color, texture, turgor normal. No rashes or lesions   Neurologic: CNII-XII intact. No focal motor or sensory deficit.        Labs Reviewed:    CMP:   Lab Results   Component Value Date/Time     (L) 2018 02:30 AM    K 3.2 (L) 2018 02:30 AM     2018 02:30 AM    CO2 28 2018 02:30 AM    AGAP 7 2018 02:30 AM    GLU 83 2018 02:30 AM    BUN 5 (L) 2018 02:30 AM    CREA 0.65 2018 02:30 AM    GFRAA >60 2018 02:30 AM    GFRNA >60 2018 02:30 AM    CA 8.0 (L) 2018 02:30 AM     CBC:   Lab Results   Component Value Date/Time    WBC 7.9 2018 02:30 AM    HGB 11.2 (L) 2018 02:30 AM    HCT 33.0 (L) 2018 02:30 AM     (L) 2018 02:30 AM     Recent Glucose Results:   Lab Results   Component Value Date/Time    GLU 83 05/03/2018 02:30 AM     5/1/18 CT Abd Pelv w/ contrast  IMPRESSION:     1. Left kidney patchy hypoenhancement, nonspecific, suspicious for  pyelonephritis. Recommend clinical correlation.     2. Mild perirectal/presacral fat stranding, similar to recent priors,  nonspecific. Correlate clinically. 5/1/18 XR abdomen 1 view  IMPRESSION:     1. No acute findings.     EKG 5/1/18  Normal sinus rhythm   Normal ECG   When compared with ECG of 12-NOV-2012 13:04,   No significant change was found   Confirmed by Nya Yepez ([id number]) on 5/2/2018 9:02:26 PM       Procedures/imaging: see electronic medical records for all procedures/Xrays and details which were not copied into this note but were reviewed prior to creation of Plan        Assessment/Plan     Active Problems:    Bacteremia (5/3/2018)      Pyelonephritis (5/3/2018)       Pyelonephritis with bacteremia  - continue IV ABT vancomycin and zosyn, follow up blood and urine culture speciation and sensitivities, de-escalate ABT as appropriate  - recheck blood cultures today  - NS @ 75ml/hr x24hrs  - pain control with motrin, percocet prn  - zofran prn nausea    Hypokalemia mild  - give 40meq kcl po x1, recheck in am    Anemia  - check iron, b12/folic acid studies, monitor cbc    Bipolar/depression/anxiety  - continue elavil 25mg qhs    DVT/GI Prophylaxis: Lovenox    CODE:  Debbie Mayes MD  5/3/2018  8:14 AM

## 2018-05-03 NOTE — PROGRESS NOTES
Problem: Falls - Risk of  Goal: *Absence of Falls  Document Stacey Fall Risk and appropriate interventions in the flowsheet. Outcome: Progressing Towards Goal  Fall Risk Interventions:                               Problem: Falls - Risk of  Goal: *Absence of Falls  Document Stacey Fall Risk and appropriate interventions in the flowsheet.    Outcome: Progressing Towards Goal  Fall Risk Interventions:

## 2018-05-03 NOTE — CALL BACK NOTE
Called patient re: positive blood cultures. Advised her returning to ED for further treatment. - pt says she'll come tonight.  - dwayne mota

## 2018-05-04 LAB
ALBUMIN SERPL-MCNC: 2.3 G/DL (ref 3.4–5)
ALBUMIN/GLOB SERPL: 0.8 {RATIO} (ref 0.8–1.7)
ALP SERPL-CCNC: 189 U/L (ref 45–117)
ALT SERPL-CCNC: 39 U/L (ref 13–56)
ANION GAP SERPL CALC-SCNC: 7 MMOL/L (ref 3–18)
AST SERPL-CCNC: 25 U/L (ref 15–37)
BACTERIA SPEC CULT: ABNORMAL
BACTERIA SPEC CULT: NORMAL
BASOPHILS # BLD: 0 K/UL (ref 0–0.1)
BASOPHILS NFR BLD: 0 % (ref 0–2)
BILIRUB SERPL-MCNC: 0.5 MG/DL (ref 0.2–1)
BUN SERPL-MCNC: 5 MG/DL (ref 7–18)
BUN/CREAT SERPL: 8 (ref 12–20)
CALCIUM SERPL-MCNC: 7.3 MG/DL (ref 8.5–10.1)
CHLORIDE SERPL-SCNC: 108 MMOL/L (ref 100–108)
CO2 SERPL-SCNC: 26 MMOL/L (ref 21–32)
CREAT SERPL-MCNC: 0.62 MG/DL (ref 0.6–1.3)
DATE LAST DOSE: NORMAL
DIFFERENTIAL METHOD BLD: ABNORMAL
EOSINOPHIL # BLD: 0 K/UL (ref 0–0.4)
EOSINOPHIL NFR BLD: 1 % (ref 0–5)
ERYTHROCYTE [DISTWIDTH] IN BLOOD BY AUTOMATED COUNT: 12.6 % (ref 11.6–14.5)
FOLATE SERPL-MCNC: 11 NG/ML (ref 3.1–17.5)
GLOBULIN SER CALC-MCNC: 2.9 G/DL (ref 2–4)
GLUCOSE SERPL-MCNC: 121 MG/DL (ref 74–99)
GRAM STN SPEC: ABNORMAL
HCT VFR BLD AUTO: 31.7 % (ref 35–45)
HGB BLD-MCNC: 10.8 G/DL (ref 12–16)
LACTATE SERPL-SCNC: 1.1 MMOL/L (ref 0.4–2)
LYMPHOCYTES # BLD: 1.3 K/UL (ref 0.9–3.6)
LYMPHOCYTES NFR BLD: 22 % (ref 21–52)
MCH RBC QN AUTO: 32.1 PG (ref 24–34)
MCHC RBC AUTO-ENTMCNC: 34.1 G/DL (ref 31–37)
MCV RBC AUTO: 94.3 FL (ref 74–97)
MONOCYTES # BLD: 1 K/UL (ref 0.05–1.2)
MONOCYTES NFR BLD: 16 % (ref 3–10)
NEUTS SEG # BLD: 3.7 K/UL (ref 1.8–8)
NEUTS SEG NFR BLD: 61 % (ref 40–73)
PLATELET # BLD AUTO: 148 K/UL (ref 135–420)
PMV BLD AUTO: 10.3 FL (ref 9.2–11.8)
POTASSIUM SERPL-SCNC: 3.4 MMOL/L (ref 3.5–5.5)
PROT SERPL-MCNC: 5.2 G/DL (ref 6.4–8.2)
RBC # BLD AUTO: 3.36 M/UL (ref 4.2–5.3)
REPORTED DOSE,DOSE: NORMAL UNITS
REPORTED DOSE/TIME,TMG: 300
SERVICE CMNT-IMP: ABNORMAL
SERVICE CMNT-IMP: NORMAL
SODIUM SERPL-SCNC: 141 MMOL/L (ref 136–145)
VANCOMYCIN TROUGH SERPL-MCNC: 18.6 UG/ML (ref 10–20)
VIT B12 SERPL-MCNC: 1394 PG/ML (ref 211–911)
WBC # BLD AUTO: 6 K/UL (ref 4.6–13.2)

## 2018-05-04 PROCEDURE — 65270000029 HC RM PRIVATE

## 2018-05-04 PROCEDURE — 83605 ASSAY OF LACTIC ACID: CPT | Performed by: FAMILY MEDICINE

## 2018-05-04 PROCEDURE — 94760 N-INVAS EAR/PLS OXIMETRY 1: CPT

## 2018-05-04 PROCEDURE — 74011000258 HC RX REV CODE- 258: Performed by: FAMILY MEDICINE

## 2018-05-04 PROCEDURE — 82607 VITAMIN B-12: CPT | Performed by: FAMILY MEDICINE

## 2018-05-04 PROCEDURE — 74011000250 HC RX REV CODE- 250: Performed by: INTERNAL MEDICINE

## 2018-05-04 PROCEDURE — 80202 ASSAY OF VANCOMYCIN: CPT | Performed by: FAMILY MEDICINE

## 2018-05-04 PROCEDURE — 85025 COMPLETE CBC W/AUTO DIFF WBC: CPT | Performed by: FAMILY MEDICINE

## 2018-05-04 PROCEDURE — 74011250636 HC RX REV CODE- 250/636: Performed by: INTERNAL MEDICINE

## 2018-05-04 PROCEDURE — 36415 COLL VENOUS BLD VENIPUNCTURE: CPT | Performed by: FAMILY MEDICINE

## 2018-05-04 PROCEDURE — 74011250637 HC RX REV CODE- 250/637: Performed by: FAMILY MEDICINE

## 2018-05-04 PROCEDURE — 83550 IRON BINDING TEST: CPT | Performed by: FAMILY MEDICINE

## 2018-05-04 PROCEDURE — 74011250636 HC RX REV CODE- 250/636: Performed by: FAMILY MEDICINE

## 2018-05-04 PROCEDURE — 80053 COMPREHEN METABOLIC PANEL: CPT | Performed by: FAMILY MEDICINE

## 2018-05-04 RX ORDER — POTASSIUM CHLORIDE 1.5 G/1.77G
20 POWDER, FOR SOLUTION ORAL
Status: COMPLETED | OUTPATIENT
Start: 2018-05-04 | End: 2018-05-04

## 2018-05-04 RX ADMIN — Medication 2 G: at 14:53

## 2018-05-04 RX ADMIN — MORPHINE SULFATE 2 MG: 4 INJECTION INTRAVENOUS at 14:53

## 2018-05-04 RX ADMIN — SODIUM CHLORIDE 3.38 G: 900 INJECTION, SOLUTION INTRAVENOUS at 04:28

## 2018-05-04 RX ADMIN — POTASSIUM CHLORIDE 20 MEQ: 1.5 POWDER, FOR SOLUTION ORAL at 09:13

## 2018-05-04 RX ADMIN — MORPHINE SULFATE 2 MG: 4 INJECTION INTRAVENOUS at 18:38

## 2018-05-04 RX ADMIN — ONDANSETRON 4 MG: 4 TABLET, ORALLY DISINTEGRATING ORAL at 23:57

## 2018-05-04 RX ADMIN — MORPHINE SULFATE 2 MG: 4 INJECTION INTRAVENOUS at 23:59

## 2018-05-04 RX ADMIN — ONDANSETRON 4 MG: 4 TABLET, ORALLY DISINTEGRATING ORAL at 06:57

## 2018-05-04 RX ADMIN — MORPHINE SULFATE 2 MG: 4 INJECTION INTRAVENOUS at 09:52

## 2018-05-04 RX ADMIN — FAMOTIDINE 20 MG: 20 TABLET, FILM COATED ORAL at 18:38

## 2018-05-04 RX ADMIN — SODIUM CHLORIDE 75 ML/HR: 900 INJECTION, SOLUTION INTRAVENOUS at 04:31

## 2018-05-04 RX ADMIN — VANCOMYCIN HYDROCHLORIDE 750 MG: 10 INJECTION, POWDER, LYOPHILIZED, FOR SOLUTION INTRAVENOUS at 04:29

## 2018-05-04 RX ADMIN — VANCOMYCIN HYDROCHLORIDE 750 MG: 10 INJECTION, POWDER, LYOPHILIZED, FOR SOLUTION INTRAVENOUS at 12:40

## 2018-05-04 RX ADMIN — SODIUM CHLORIDE 3.38 G: 900 INJECTION, SOLUTION INTRAVENOUS at 09:13

## 2018-05-04 RX ADMIN — ONDANSETRON 4 MG: 4 TABLET, ORALLY DISINTEGRATING ORAL at 14:54

## 2018-05-04 RX ADMIN — FAMOTIDINE 20 MG: 20 TABLET, FILM COATED ORAL at 09:13

## 2018-05-04 RX ADMIN — AMITRIPTYLINE HYDROCHLORIDE 25 MG: 25 TABLET, FILM COATED ORAL at 22:04

## 2018-05-04 NOTE — ROUTINE PROCESS
Bedside and Verbal shift change report given to ORLANDO Arizmendi (oncoming nurse) by Carrillo Boucher (offgoing nurse). Report included the following information SBAR, Kardex, Intake/Output, MAR and Recent Results.

## 2018-05-04 NOTE — ROUTINE PROCESS
Bedside, Verbal and Written shift change report given to James (oncoming nurse) by Sylvester PERRY(offgoing nurse). Report included the following information SBAR, Kardex, and MAR. Hourly rounding and  chart checks completed.

## 2018-05-04 NOTE — PROGRESS NOTES
Problem: Falls - Risk of  Goal: *Absence of Falls  Document Stacey Fall Risk and appropriate interventions in the flowsheet. Fall Risk Interventions:     Medication Interventions: Teach patient to arise slowly, Patient to call before getting OOB, Evaluate medications/consider consulting pharmacy  Pt continues to cooperate with nursing staff when needing assistance to get out of bed.

## 2018-05-04 NOTE — ROUTINE PROCESS
Report received from Grisel Fuller [id number], patient quietly sleeping on rounds, respirations even. 0130 Patient awake and crying and talking on phone, upset \"boyfriend\" left without saying anything. 0300 Patient eventually quiet down and resting.

## 2018-05-04 NOTE — ROUTINE PROCESS
Bedside, Verbal and Written shift change report given to [employer], RN (oncoming nurse) by Fish Voss RN(offgoing nurse). Report included the following information SBAR, Kardex, and MAR. Hourly rounding and  chart checks completed.

## 2018-05-04 NOTE — CONSULTS
Infectious Disease Consultation Note    Requested by: Dr. Jono Gupta    Reason: sepsis, e.coli bacteremia, pyelonephritis    Current abx Prior abx   Pip/tazo, vancomycin since 5/3/18      Lines:       Assessment :    33 y.o.  female with h/o bipolar disorder, recurrent UTI who was called back to the ER on 5/3/18 after blood cultures returned positive. Clinical presentation c/w partially treated sepsis (POA) due to e.coli bloodstream infection (positive blood culture 5/1), left kidney pyelonephritis. Most likely source of bloodstream infection is left kidney pyelonephritis. Persistent left flank pain/tenderness despite appropriate abx could be due to severe infection versus evolving left renal abscess. Recent ct scan 5/1 didn't reveal evidence of renal abscess. We will have to repeat imaging studies after few days of antibiotics to definitively exclude this. Recommendations:    1. D/c pip/tazo, vancomycin. Start ceftriaxone  2. F/u blood cultures, urine cultures 5/1  3. Monitor left flank pain. Will repeat ct scan next week if persistent left flank pain/tenderness    Advance Care planning: full code: discussed  with patient/surrogate decision maker: Karolina Kwan: [phone]    Thank you for consultation request. Above plan was discussed in details with patient,  and dr Jono Gupta. Please call me if any further questions or concerns. Will continue to participate in the care of this patient. HPI:    33 y.o.  female with h/o bipolar disorder, recurrent UTI who was called back to the ER on 5/3/18 after blood cultures returned positive for admission with IV antibiotics. Patient originally presented to ER on 5/1/18 for left flank pain, nauea, vomiting, similar to prior history of pancreatitis. CT Abd and exam with findings of mild pyelonephritis, patient given IV cefepime in ER and discharged home with po bactrim and pain medications.   Patient reports taking medication as prescribed, still having significant left flank pain, having hesitency with urination but no burning, frequency or pain. Patient started on vancomycin and zosyn and admitted for IV antibiotic therapy pending culture results. Her blood cultures are now positive for e.coli. I have been consulted for further recommendations. Chart review reveals she had e.coli uti in 11/2017, 4/2018. She couldn't complain the keflex she was prescribed in 4/2018 since he was nauseous. She doesn't know for sure why she has recurrent UTI. Patient denies headaches, visual disturbances, sore throat, runny nose, earaches, cp, sob, chills, cough, abdominal pain, diarrhea, burning micturition, pain or weakness in extremities. She is sexually active with one partner. . No known h/o MRSA colonization or infection in the past.        Past Medical History:   Diagnosis Date    Bipolar 1 disorder, depressed ([address]     Cocaine abuse     Neurological disorder     Pancreatitis     Psychiatric disorder        Past Surgical History:   Procedure Laterality Date    HX PELVIC LAPAROSCOPY      for perforated IUD removal    UPPER GI ENDOSCOPY,BIOPSY  3/29/2017            home Medication List    Details   amitriptyline (ELAVIL) 25 mg tablet Take 25 mg by mouth nightly. oxyCODONE-acetaminophen (PERCOCET) 5-325 mg per tablet Take 1 Tab by mouth every four (4) hours as needed for Pain for up to 20 doses. Max Daily Amount: 6 Tabs. Qty: 15 Tab, Refills: 0    Associated Diagnoses: Pyelonephritis      trimethoprim-sulfamethoxazole (BACTRIM DS) 160-800 mg per tablet Take 1 Tab by mouth two (2) times a day for 10 days. Qty: 20 Tab, Refills: 0    Associated Diagnoses: Pyelonephritis      phenazopyridine (PYRIDIUM) 200 mg tablet Take 1 Tab by mouth three (3) times daily for 2 days. Qty: 6 Tab, Refills: 0    Associated Diagnoses: Pyelonephritis      QUEtiapine (SEROQUEL) 50 mg tablet Take 50 mg by mouth nightly.       ibuprofen (MOTRIN) 600 mg tablet Take 1 Tab by mouth every six (6) hours as needed for Pain. Qty: 20 Tab, Refills: 0             Current Facility-Administered Medications   Medication Dose Route Frequency    amitriptyline (ELAVIL) tablet 25 mg  25 mg Oral QHS    ibuprofen (MOTRIN) tablet 600 mg  600 mg Oral Q6H PRN    oxyCODONE-acetaminophen (PERCOCET) 5-325 mg per tablet 1 Tab  1 Tab Oral Q8H PRN    ondansetron (ZOFRAN ODT) tablet 4 mg  4 mg Oral Q4H PRN    enoxaparin (LOVENOX) injection 40 mg  40 mg SubCUTAneous Q24H    piperacillin-tazobactam (ZOSYN) 3.375 g in 0.9% sodium chloride (MBP/ADV) 100 mL ADV  3.375 g IntraVENous Q6H    vancomycin (VANCOCIN) 750 mg in 0.9% sodium chloride 250 mL IVPB  750 mg IntraVENous Q8H    Vancomycin TROUGH due on  at 10:30 BEFORE the dose at 11:00. Thanks! Other ONCE    morphine injection 2 mg  2 mg IntraVENous Q4H PRN    famotidine (PEPCID) tablet 20 mg  20 mg Oral BID       Allergies: Latex, natural rubber and Doxycycline    History reviewed. No pertinent family history. Social History     Social History    Marital status: SINGLE     Spouse name: N/A    Number of children: N/A    Years of education: N/A     Occupational History    Not on file.      Social History Main Topics    Smoking status: Never Smoker    Smokeless tobacco: Never Used    Alcohol use 0.5 oz/week     1 Standard drinks or equivalent per week      Comment: 5-6 shots/week    Drug use: 7.00 per week     Special: Marijuana    Sexual activity: Yes     Partners: Male     Birth control/ protection: None     Other Topics Concern    Not on file     Social History Narrative     History   Smoking Status    Never Smoker   Smokeless Tobacco    Never Used        Temp (24hrs), Av.4 °F (36.9 °C), Min:98 °F (36.7 °C), Max:99.2 °F (37.3 °C)    Visit Vitals    /69 (BP 1 Location: Left arm, BP Patient Position: At rest)    Pulse 81    Temp 98.5 °F (36.9 °C)    Resp 18    Ht 4' 8\" (1.422 m)    Wt 43.1 kg (95 lb)    LMP 04/25/2018    SpO2 96%    Breastfeeding No    BMI 21.3 kg/m2       ROS: 12 point ROS obtained in details. Pertinent positives as mentioned in HPI,   otherwise negative    Physical Exam:    General:  Alert, cooperative, no distress, appears stated age. Head:  Normocephalic, without obvious abnormality, atraumatic. Eyes:  Conjunctivae/corneas clear. PERRL, EOMs intact. Nose: Nares normal. No drainage or sinus tenderness. Throat: Lips, mucosa, and tongue normal.    Neck: Supple, symmetrical, trachea midline, no adenopathy   Back:   +L CVA tenderness   Lungs:   Clear to auscultation bilaterally. Chest wall:  No tenderness or deformity. Heart:  Regular rate and rhythm, S1, S2 normal, no murmur, click, rub or gallop. Abdomen: Soft, Mild TTP LLQ. No rebound. Bowel sounds normal. No masses,  No organomegaly. Extremities: Extremities normal, atraumatic, no cyanosis or edema. Pulses: 2+ and symmetric all extremities. Skin: Skin color, texture, turgor normal. No rashes or lesions   Neurologic: CNII-XII intact.  No focal motor or sensory deficit.             Labs: Results:   Chemistry Recent Labs      05/04/18 0457 05/03/18 0230 05/01/18 2208   GLU  121*  83  111*   NA  141  135*  134*   K  3.4*  3.2*  3.5   CL  108  100  99*   CO2  26  28  27   BUN  5*  5*  10   CREA  0.62  0.65  0.90   CA  7.3*  8.0*  8.7   AGAP  7  7  8   BUCR  8*  8*  11*   AP  189*   --   211*   TP  5.2*   --   8.0   ALB  2.3*   --   3.5   GLOB  2.9   --   4.5*   AGRAT  0.8   --   0.8      CBC w/Diff Recent Labs      05/04/18 0457 05/03/18   0230  05/01/18 2208   WBC  6.0  7.9  13.2   RBC  3.36*  3.52*  4.23   HGB  10.8*  11.2*  13.9   HCT  31.7*  33.0*  40.4   PLT  148  130*  148   GRANS  61  76*  80*   LYMPH  22  12*  10*   EOS  1  0  0      Microbiology Recent Labs      05/03/18   0942  05/03/18   0932  05/01/18   2307  05/01/18   2245  05/01/18   2207   CULT  NO GROWTH AFTER 20 HOURS  NO GROWTH AFTER 20 HOURS AEROBIC AND ANAEROBIC BOTTLES STAPHYLOCOCCUS EPIDERMIDIS*  AEROBIC BOTTLE ESCHERICHIA COLI*  >100,000 COLONIES/mL ESCHERICHIA COLI*          RADIOLOGY:    All available imaging studies/reports in [facility] for this admission were reviewed    Dr. Ashley Tena, Infectious Disease Specialist  [phone]  May 4, 2018  10:20 AM

## 2018-05-05 LAB
ALBUMIN SERPL-MCNC: 2.2 G/DL (ref 3.4–5)
ALBUMIN/GLOB SERPL: 0.7 {RATIO} (ref 0.8–1.7)
ALP SERPL-CCNC: 185 U/L (ref 45–117)
ALT SERPL-CCNC: 49 U/L (ref 13–56)
ANION GAP SERPL CALC-SCNC: 4 MMOL/L (ref 3–18)
AST SERPL-CCNC: 39 U/L (ref 15–37)
BASOPHILS # BLD: 0 K/UL (ref 0–0.1)
BASOPHILS NFR BLD: 0 % (ref 0–2)
BILIRUB SERPL-MCNC: 0.3 MG/DL (ref 0.2–1)
BUN SERPL-MCNC: 2 MG/DL (ref 7–18)
BUN/CREAT SERPL: 4 (ref 12–20)
CALCIUM SERPL-MCNC: 7.6 MG/DL (ref 8.5–10.1)
CHLORIDE SERPL-SCNC: 106 MMOL/L (ref 100–108)
CO2 SERPL-SCNC: 30 MMOL/L (ref 21–32)
CREAT SERPL-MCNC: 0.57 MG/DL (ref 0.6–1.3)
DIFFERENTIAL METHOD BLD: ABNORMAL
EOSINOPHIL # BLD: 0 K/UL (ref 0–0.4)
EOSINOPHIL NFR BLD: 0 % (ref 0–5)
ERYTHROCYTE [DISTWIDTH] IN BLOOD BY AUTOMATED COUNT: 12.9 % (ref 11.6–14.5)
GLOBULIN SER CALC-MCNC: 3 G/DL (ref 2–4)
GLUCOSE SERPL-MCNC: 104 MG/DL (ref 74–99)
HCT VFR BLD AUTO: 29.9 % (ref 35–45)
HGB BLD-MCNC: 10 G/DL (ref 12–16)
IRON SATN MFR SERPL: 11 % (ref 20–50)
IRON SERPL-MCNC: 22 UG/DL (ref 50–175)
LYMPHOCYTES # BLD: 1.9 K/UL (ref 0.9–3.6)
LYMPHOCYTES NFR BLD: 39 % (ref 21–52)
MAGNESIUM SERPL-MCNC: 1.8 MG/DL (ref 1.6–2.6)
MCH RBC QN AUTO: 31.6 PG (ref 24–34)
MCHC RBC AUTO-ENTMCNC: 33.4 G/DL (ref 31–37)
MCV RBC AUTO: 94.6 FL (ref 74–97)
MONOCYTES # BLD: 0.7 K/UL (ref 0.05–1.2)
MONOCYTES NFR BLD: 15 % (ref 3–10)
NEUTS SEG # BLD: 2.3 K/UL (ref 1.8–8)
NEUTS SEG NFR BLD: 46 % (ref 40–73)
PLATELET # BLD AUTO: 174 K/UL (ref 135–420)
PMV BLD AUTO: 10.8 FL (ref 9.2–11.8)
POTASSIUM SERPL-SCNC: 3.6 MMOL/L (ref 3.5–5.5)
PROT SERPL-MCNC: 5.2 G/DL (ref 6.4–8.2)
RBC # BLD AUTO: 3.16 M/UL (ref 4.2–5.3)
SODIUM SERPL-SCNC: 140 MMOL/L (ref 136–145)
TIBC SERPL-MCNC: 202 UG/DL (ref 250–450)
WBC # BLD AUTO: 5 K/UL (ref 4.6–13.2)

## 2018-05-05 PROCEDURE — 74011250637 HC RX REV CODE- 250/637: Performed by: FAMILY MEDICINE

## 2018-05-05 PROCEDURE — 65270000029 HC RM PRIVATE

## 2018-05-05 PROCEDURE — 36415 COLL VENOUS BLD VENIPUNCTURE: CPT | Performed by: FAMILY MEDICINE

## 2018-05-05 PROCEDURE — 83540 ASSAY OF IRON: CPT | Performed by: FAMILY MEDICINE

## 2018-05-05 PROCEDURE — 74011000250 HC RX REV CODE- 250: Performed by: INTERNAL MEDICINE

## 2018-05-05 PROCEDURE — 80053 COMPREHEN METABOLIC PANEL: CPT | Performed by: FAMILY MEDICINE

## 2018-05-05 PROCEDURE — 83735 ASSAY OF MAGNESIUM: CPT | Performed by: FAMILY MEDICINE

## 2018-05-05 PROCEDURE — 74011250636 HC RX REV CODE- 250/636: Performed by: FAMILY MEDICINE

## 2018-05-05 PROCEDURE — 74011250636 HC RX REV CODE- 250/636: Performed by: INTERNAL MEDICINE

## 2018-05-05 PROCEDURE — 85025 COMPLETE CBC W/AUTO DIFF WBC: CPT | Performed by: FAMILY MEDICINE

## 2018-05-05 RX ADMIN — Medication 2 G: at 14:59

## 2018-05-05 RX ADMIN — OXYCODONE HYDROCHLORIDE AND ACETAMINOPHEN 1 TABLET: 5; 325 TABLET ORAL at 14:08

## 2018-05-05 RX ADMIN — FAMOTIDINE 20 MG: 20 TABLET, FILM COATED ORAL at 09:47

## 2018-05-05 RX ADMIN — FAMOTIDINE 20 MG: 20 TABLET, FILM COATED ORAL at 16:47

## 2018-05-05 RX ADMIN — MORPHINE SULFATE 2 MG: 4 INJECTION INTRAVENOUS at 09:53

## 2018-05-05 RX ADMIN — ONDANSETRON 4 MG: 4 TABLET, ORALLY DISINTEGRATING ORAL at 09:52

## 2018-05-05 RX ADMIN — MORPHINE SULFATE 2 MG: 4 INJECTION INTRAVENOUS at 23:13

## 2018-05-05 RX ADMIN — OXYCODONE HYDROCHLORIDE AND ACETAMINOPHEN 1 TABLET: 5; 325 TABLET ORAL at 21:30

## 2018-05-05 RX ADMIN — AMITRIPTYLINE HYDROCHLORIDE 25 MG: 25 TABLET, FILM COATED ORAL at 21:30

## 2018-05-05 RX ADMIN — MORPHINE SULFATE 2 MG: 4 INJECTION INTRAVENOUS at 16:44

## 2018-05-05 RX ADMIN — ONDANSETRON 4 MG: 4 TABLET, ORALLY DISINTEGRATING ORAL at 16:44

## 2018-05-05 NOTE — PROGRESS NOTES
Problem: Falls - Risk of  Goal: *Absence of Falls  Document Stacey Fall Risk and appropriate interventions in the flowsheet. Outcome: Progressing Towards Goal  Fall Risk Interventions:            Medication Interventions: Patient to call before getting OOB         History of Falls Interventions: Evaluate medications/consider consulting pharmacy        Problem: Falls - Risk of  Goal: *Absence of Falls  Document Stacey Fall Risk and appropriate interventions in the flowsheet. Fall Risk Interventions:            Medication Interventions: Patient to call before getting OOB         History of Falls Interventions: Evaluate medications/consider consulting pharmacy        Problem: Pressure Injury - Risk of  Goal: *Prevention of pressure injury  Document Madhav Scale and appropriate interventions in the flowsheet.    Outcome: Progressing Towards Goal  Pressure Injury Interventions:

## 2018-05-05 NOTE — ROUTINE PROCESS
Bedside and Verbal shift change report given to [address] (oncoming nurse) by EDUARD Olivarez RN (offgoing nurse). Report given with SBAR, Kardex, MAR and Recent Results.

## 2018-05-05 NOTE — ROUTINE PROCESS
Bedside and Verbal shift change report given to CHANTAL RN (oncoming nurse) by Sabra Forrester RN (offgoing nurse). Report given with SBAR, Kardex, Intake/Output, MAR, Accordion and Recent Results.

## 2018-05-05 NOTE — PROGRESS NOTES
Progress Note    Patient: Avinash Ribeiro MRN: [id number]  SSN: [id number]    YOB: 1990  Age: 33 y.o. Sex: female      Admit Date: 5/2/2018    LOS: 2 days     Subjective:     Patient admitted due to pyelonephritis with positive blood and urine cultures. Nausea improved but still poor appetite. Objective:     Vitals:    05/04/18 2000 05/04/18 2357 05/05/18 0404 05/05/18 0800   BP: 135/82 123/78 102/64 139/86   Pulse: 72 (!) 57 61 66   Resp: 16 16 18 17   Temp: 98.7 °F (37.1 °C) 98.7 °F (37.1 °C) 98.2 °F (36.8 °C) 98.7 °F (37.1 °C)   SpO2: 98% 99% 96% 96%   Weight:       Height:            Intake and Output:  Current Shift:    Last three shifts: 05/03 1901 - 05/05 0700  In: 2580 [I.V.:2580]  Out: 120     Physical Exam:   GENERAL: alert, fatigued, appears older than stated age  LUNG: clear to auscultation bilaterally  HEART: regular rate and rhythm, S1, S2 normal, no murmur, click, rub or gallop    Lab/Data Review: All lab results for the last 24 hours reviewed. Wbc 5.0 hct 29,9  Repeat blood culture negative so far. Assessment:     Active Problems:    Bacteremia (5/3/2018)      Pyelonephritis (5/3/2018)        Plan:     Continue present management.     Signed By: Melvi Graff MD     May 5, 2018

## 2018-05-05 NOTE — CONSULTS
ASSESSMENT:   Left pyelo/uti/bacteremia. E Coli, pansensitive. CT reviewed: no obstruction, no drainage or surgical intervention needed. Improving clinically with abx-- afebrile, VSS, normal WBC. Pain/ttp persist and will resolve slowly. PLAN:    Antibiotics per ID. Urology follow up in 3-4 weeks with renal ultrasound prior. Office will arrange. Call if needed. Signing off. Manjeet Vann MD  [facility]               Chief Complaint   Patient presents with    Other     positive blood cultures    Back Pain       HISTORY OF PRESENT ILLNESS:  Reji Sam is a 33 y.o. female who Dr Kiko Astorga asked me to see in consultation for L pyelo.    33 y.o.  female with h/o bipolar disorder, recurrent UTI who was called back to the ER on 5/3/18 after blood cultures returned positive for admission with IV antibiotics.      Patient originally presented to ER on 5/1/18 for left flank pain, nauea, vomiting, similar to prior history of pancreatitis.  CT Abd and exam with findings of mild pyelonephritis, patient given IV cefepime in ER and discharged home with po bactrim and pain medications.  Patient reports taking medication as prescribed, still having significant left flank pain, having hesitency with urination but no burning, frequency or pain.  Patient started on vancomycin and zosyn and admitted for IV antibiotic therapy pending culture results. Her blood cultures are now positive for e.coli.       No voiding c/o. No incontinence. No gross hematuria. No prior  interventions. No stones.     Past Medical History:   Diagnosis Date    Bipolar 1 disorder, depressed ([address]     Cocaine abuse     Neurological disorder     Pancreatitis     Psychiatric disorder        Past Surgical History:   Procedure Laterality Date    HX PELVIC LAPAROSCOPY      for perforated IUD removal    UPPER GI ENDOSCOPY,BIOPSY  3/29/2017            Social History   Substance Use Topics    Smoking status: Never Smoker    Smokeless tobacco: Never Used    Alcohol use 0.5 oz/week     1 Standard drinks or equivalent per week      Comment: 5-6 shots/week       Allergies   Allergen Reactions    Latex, Natural Rubber Rash    Doxycycline Hives       History reviewed. No pertinent family history.     Current Facility-Administered Medications   Medication Dose Route Frequency Provider Last Rate Last Dose    cefTRIAXone (ROCEPHIN) 2 g in sterile water (preservative free) 20 mL IV syringe  2 g IntraVENous Q24H Pavel Negron MD   2 g at 05/04/18 1453    amitriptyline (ELAVIL) tablet 25 mg  25 mg Oral QHS Tali Treadwell MD   25 mg at 05/04/18 2204    ibuprofen (MOTRIN) tablet 600 mg  600 mg Oral Q6H PRN Akosua Herman MD        oxyCODONE-acetaminophen (PERCOCET) 5-325 mg per tablet 1 Tab  1 Tab Oral Q8H PRN Akosua Herman MD   1 Tab at 05/05/18 1408    ondansetron (ZOFRAN ODT) tablet 4 mg  4 mg Oral Q4H PRN Akosua Herman MD   4 mg at 05/05/18 0952    enoxaparin (LOVENOX) injection 40 mg  40 mg SubCUTAneous Q24H Tali Treadwell MD   40 mg at 05/03/18 0856    morphine injection 2 mg  2 mg IntraVENous Q4H PRN Akosua Herman MD   2 mg at 05/05/18 0953    famotidine (PEPCID) tablet 20 mg  20 mg Oral BID Mira Peters MD   20 mg at 05/05/18 6752           Review of Systems  Constitutional: Fever: No  Skin: Rash: No  HEENT: Hearing difficulty: No  Eyes: Blurred vision: No  Cardiovascular: Chest pain: No  Respiratory: Shortness of breath: No  Gastrointestinal: Nausea/vomiting: YES  Musculoskeletal: Back pain: YES  Neurological: Weakness: No  Psychological: Memory loss: No  Comments/additional findings:   All other systems reviewed and are negative          PHYSICAL EXAMINATION:     Visit Vitals    /81 (BP 1 Location: Left arm, BP Patient Position: Sitting)    Pulse 62    Temp 98.5 °F (36.9 °C)    Resp 17    Ht 4' 8\" (1.422 m)    [place] 43.1 kg (95 lb)    LMP 04/25/2018    SpO2 99%    Breastfeeding No    BMI 21.3 kg/m2     Constitutional: Well developed, well-nourished female in no acute distress. CV:  No peripheral swelling noted  Respiratory: No respiratory distress or difficulties  Abdomen:  Soft, mild left abd ttp. No masses. No hepatosplenomegaly.  Female:  Left CVA tenderness, mild. Skin:  Normal color. No evidence of jaundice. Neuro/Psych:  Patient with appropriate affect. Alert and oriented. Lymphatic:   No enlargement of supraclavicular lymph nodes. REVIEW OF LABS AND IMAGING:    Recent Results (from the past 24 hour(s))   CBC WITH AUTOMATED DIFF    Collection Time: 05/05/18  4:13 AM   Result Value Ref Range    WBC 5.0 4.6 - 13.2 K/uL    RBC 3.16 (L) 4.20 - 5.30 M/uL    HGB 10.0 (L) 12.0 - 16.0 g/dL    HCT 29.9 (L) 35.0 - 45.0 %    MCV 94.6 74.0 - 97.0 FL    MCH 31.6 24.0 - 34.0 PG    MCHC 33.4 31.0 - 37.0 g/dL    RDW 12.9 11.6 - 14.5 %    PLATELET 982 734 - 567 K/uL    MPV 10.8 9.2 - 11.8 FL    NEUTROPHILS 46 40 - 73 %    LYMPHOCYTES 39 21 - 52 %    MONOCYTES 15 (H) 3 - 10 %    EOSINOPHILS 0 0 - 5 %    BASOPHILS 0 0 - 2 %    ABS. NEUTROPHILS 2.3 1.8 - 8.0 K/UL    ABS. LYMPHOCYTES 1.9 0.9 - 3.6 K/UL    ABS. MONOCYTES 0.7 0.05 - 1.2 K/UL    ABS. EOSINOPHILS 0.0 0.0 - 0.4 K/UL    ABS.  BASOPHILS 0.0 0.0 - 0.1 K/UL    DF AUTOMATED     METABOLIC PANEL, COMPREHENSIVE    Collection Time: 05/05/18  4:13 AM   Result Value Ref Range    Sodium 140 136 - 145 mmol/L    Potassium 3.6 3.5 - 5.5 mmol/L    Chloride 106 100 - 108 mmol/L    CO2 30 21 - 32 mmol/L    Anion gap 4 3.0 - 18 mmol/L    Glucose 104 (H) 74 - 99 mg/dL    BUN 2 (L) 7.0 - 18 MG/DL    Creatinine 0.57 (L) 0.6 - 1.3 MG/DL    BUN/Creatinine ratio 4 (L) 12 - 20      GFR est AA >60 >60 ml/min/1.73m2    GFR est non-AA >60 >60 ml/min/1.73m2    Calcium 7.6 (L) 8.5 - 10.1 MG/DL    Bilirubin, total 0.3 0.2 - 1.0 MG/DL    ALT (SGPT) 49 13 - 56 U/L    AST (SGOT) 39 (H) 15 - 37 U/L    Alk. phosphatase 185 (H) 45 - 117 U/L    Protein, total 5.2 (L) 6.4 - 8.2 g/dL    Albumin 2.2 (L) 3.4 - 5.0 g/dL    Globulin 3.0 2.0 - 4.0 g/dL    A-G Ratio 0.7 (L) 0.8 - 1.7     MAGNESIUM    Collection Time: 05/05/18  4:13 AM   Result Value Ref Range    Magnesium 1.8 1.6 - 2.6 mg/dL   IRON PROFILE    Collection Time: 05/05/18  4:13 AM   Result Value Ref Range    Iron 22 (L) 50 - 175 ug/dL    TIBC 202 (L) 250 - 450 ug/dL    Iron % saturation 11 (L) 20 - 50 %       Blood and UCx 5/1/18 positive for E Coli, pansensitive. Imaging Report Reviewed? YES   Type:   CT  Images Reviewed? YES      Type:   CT    CT abd/pel with contrast 5/1/18  IMPRESSION:    1. Left kidney patchy hypoenhancement, nonspecific, suspicious for  pyelonephritis. Recommend clinical correlation. 2. Mild perirectal/presacral fat stranding, similar to recent priors,  nonspecific. Correlate clinically. Thank you for the opportunity to participate in the care of this patient.       Ceci Dyson MD    [facility] Dept of Urology  [facility]    5/5/2018

## 2018-05-06 LAB
ANION GAP SERPL CALC-SCNC: 5 MMOL/L (ref 3–18)
BUN SERPL-MCNC: 7 MG/DL (ref 7–18)
BUN/CREAT SERPL: 13 (ref 12–20)
CALCIUM SERPL-MCNC: 8.5 MG/DL (ref 8.5–10.1)
CHLORIDE SERPL-SCNC: 104 MMOL/L (ref 100–108)
CO2 SERPL-SCNC: 30 MMOL/L (ref 21–32)
CREAT SERPL-MCNC: 0.53 MG/DL (ref 0.6–1.3)
GLUCOSE SERPL-MCNC: 118 MG/DL (ref 74–99)
POTASSIUM SERPL-SCNC: 3.2 MMOL/L (ref 3.5–5.5)
SODIUM SERPL-SCNC: 139 MMOL/L (ref 136–145)

## 2018-05-06 PROCEDURE — 74011250637 HC RX REV CODE- 250/637: Performed by: FAMILY MEDICINE

## 2018-05-06 PROCEDURE — 74011250636 HC RX REV CODE- 250/636: Performed by: FAMILY MEDICINE

## 2018-05-06 PROCEDURE — 36415 COLL VENOUS BLD VENIPUNCTURE: CPT | Performed by: FAMILY MEDICINE

## 2018-05-06 PROCEDURE — 80048 BASIC METABOLIC PNL TOTAL CA: CPT | Performed by: FAMILY MEDICINE

## 2018-05-06 PROCEDURE — 74011000250 HC RX REV CODE- 250: Performed by: INTERNAL MEDICINE

## 2018-05-06 PROCEDURE — 65270000029 HC RM PRIVATE

## 2018-05-06 PROCEDURE — 74011250636 HC RX REV CODE- 250/636: Performed by: INTERNAL MEDICINE

## 2018-05-06 RX ORDER — ONDANSETRON 2 MG/ML
4 INJECTION INTRAMUSCULAR; INTRAVENOUS ONCE
Status: COMPLETED | OUTPATIENT
Start: 2018-05-06 | End: 2018-05-06

## 2018-05-06 RX ORDER — BUTALBITAL, ACETAMINOPHEN AND CAFFEINE 300; 40; 50 MG/1; MG/1; MG/1
1 CAPSULE ORAL
Status: DISCONTINUED | OUTPATIENT
Start: 2018-05-06 | End: 2018-05-08 | Stop reason: HOSPADM

## 2018-05-06 RX ADMIN — BUTALBITAL, ACETAMINOPHEN AND CAFFEINE 1 CAPSULE: 300; 40; 50 CAPSULE ORAL at 15:25

## 2018-05-06 RX ADMIN — AMITRIPTYLINE HYDROCHLORIDE 25 MG: 25 TABLET, FILM COATED ORAL at 21:23

## 2018-05-06 RX ADMIN — OXYCODONE HYDROCHLORIDE AND ACETAMINOPHEN 1 TABLET: 5; 325 TABLET ORAL at 17:32

## 2018-05-06 RX ADMIN — IBUPROFEN 600 MG: 600 TABLET ORAL at 14:30

## 2018-05-06 RX ADMIN — FAMOTIDINE 20 MG: 20 TABLET, FILM COATED ORAL at 17:32

## 2018-05-06 RX ADMIN — MORPHINE SULFATE 2 MG: 4 INJECTION INTRAVENOUS at 12:42

## 2018-05-06 RX ADMIN — Medication 2 G: at 14:31

## 2018-05-06 RX ADMIN — MORPHINE SULFATE 2 MG: 4 INJECTION INTRAVENOUS at 08:22

## 2018-05-06 RX ADMIN — MORPHINE SULFATE 2 MG: 4 INJECTION INTRAVENOUS at 21:24

## 2018-05-06 RX ADMIN — MORPHINE SULFATE 2 MG: 4 INJECTION INTRAVENOUS at 03:30

## 2018-05-06 RX ADMIN — ONDANSETRON 4 MG: 2 INJECTION INTRAMUSCULAR; INTRAVENOUS at 15:25

## 2018-05-06 RX ADMIN — ONDANSETRON 4 MG: 4 TABLET, ORALLY DISINTEGRATING ORAL at 14:31

## 2018-05-06 RX ADMIN — FAMOTIDINE 20 MG: 20 TABLET, FILM COATED ORAL at 08:22

## 2018-05-06 NOTE — PROGRESS NOTES
Pt c/o headache, med given. Patient c/o nausea and headache, med given, but patient vomited all of medication. Will call MD for IV med and something else for headache.

## 2018-05-06 NOTE — PROGRESS NOTES
Problem: Falls - Risk of  Goal: *Absence of Falls  Document Stacey Fall Risk and appropriate interventions in the flowsheet.    Outcome: Progressing Towards Goal  Fall Risk Interventions:            Medication Interventions: Teach patient to arise slowly         History of Falls Interventions: Consult care management for discharge planning

## 2018-05-06 NOTE — ROUTINE PROCESS
Bedside and Verbal shift change report given to Juanis BISWAS RN (oncoming nurse) by Rosaura Otoole (offgoing nurse). Report included the following information SBAR, Kardex and Recent Results.

## 2018-05-06 NOTE — ROUTINE PROCESS
Bedside and Verbal shift change report given to [employer] (oncoming nurse) by EDUARD Olivarez RN (offgoing nurse). Report given with SBAR, Kardex, MAR and Recent Results.

## 2018-05-06 NOTE — PROGRESS NOTES
Progress Note    Patient: Dora Sy MRN: [id number]  SSN: [id number]    YOB: 1990  Age: 33 y.o. Sex: female      Admit Date: 5/2/2018    LOS: 3 days     Subjective:     Patient admitted with pyelonephritis. Nausea resolved, patient improving. Objective:     Vitals:    05/05/18 1600 05/05/18 1952 05/06/18 0021 05/06/18 0421   BP: 121/78 148/74 149/84 113/67   Pulse: 61 64 60 66   Resp: 18 18 16 18   Temp: 98.5 °F (36.9 °C) 98.7 °F (37.1 °C) 98.4 °F (36.9 °C) 98.1 °F (36.7 °C)   SpO2: 97% 99% 98% 95%   Weight:       Height:            Intake and Output:  Current Shift:    Last three shifts: 05/04 1901 - 05/06 0700  In: 360 [P.O.:360]  Out: 0     Physical Exam:   GENERAL: alert, cooperative, no distress, appears stated age  LUNG: clear to auscultation bilaterally  HEART: regular rate and rhythm, S1, S2 normal, no murmur, click, rub or gallop  ABDOMEN: soft, non-tender. Bowel sounds normal. No masses,  no organomegaly    Lab/Data Review: All lab results for the last 24 hours reviewed. Repeat blood and urine cultures negative. Assessment:     Active Problems:    Bacteremia (5/3/2018)      Pyelonephritis (5/3/2018)        Plan:     Anticipate discharge in next 1-2 days once Infectious Disease recommends antibiotic regimen.     Signed By: Jagjit Davis MD     May 6, 2018

## 2018-05-07 ENCOUNTER — APPOINTMENT (OUTPATIENT)
Dept: ULTRASOUND IMAGING | Age: 28
DRG: 720 | End: 2018-05-07
Attending: INTERNAL MEDICINE
Payer: MEDICAID

## 2018-05-07 LAB
ANION GAP SERPL CALC-SCNC: 4 MMOL/L (ref 3–18)
BUN SERPL-MCNC: 11 MG/DL (ref 7–18)
BUN/CREAT SERPL: 19 (ref 12–20)
CALCIUM SERPL-MCNC: 8.7 MG/DL (ref 8.5–10.1)
CHLORIDE SERPL-SCNC: 102 MMOL/L (ref 100–108)
CO2 SERPL-SCNC: 33 MMOL/L (ref 21–32)
CREAT SERPL-MCNC: 0.59 MG/DL (ref 0.6–1.3)
GLUCOSE SERPL-MCNC: 104 MG/DL (ref 74–99)
IRON SATN MFR SERPL: 9 % (ref 20–50)
IRON SERPL-MCNC: 17 UG/DL (ref 50–175)
POTASSIUM SERPL-SCNC: 3.6 MMOL/L (ref 3.5–5.5)
SODIUM SERPL-SCNC: 139 MMOL/L (ref 136–145)
TIBC SERPL-MCNC: 198 UG/DL (ref 250–450)

## 2018-05-07 PROCEDURE — 36415 COLL VENOUS BLD VENIPUNCTURE: CPT | Performed by: FAMILY MEDICINE

## 2018-05-07 PROCEDURE — 74011250637 HC RX REV CODE- 250/637: Performed by: INTERNAL MEDICINE

## 2018-05-07 PROCEDURE — 65270000029 HC RM PRIVATE

## 2018-05-07 PROCEDURE — 74011250636 HC RX REV CODE- 250/636: Performed by: FAMILY MEDICINE

## 2018-05-07 PROCEDURE — 74011250637 HC RX REV CODE- 250/637: Performed by: FAMILY MEDICINE

## 2018-05-07 PROCEDURE — 80048 BASIC METABOLIC PNL TOTAL CA: CPT | Performed by: FAMILY MEDICINE

## 2018-05-07 PROCEDURE — 76770 US EXAM ABDO BACK WALL COMP: CPT

## 2018-05-07 RX ORDER — CIPROFLOXACIN 500 MG/1
500 TABLET ORAL EVERY 12 HOURS
Status: DISCONTINUED | OUTPATIENT
Start: 2018-05-07 | End: 2018-05-08 | Stop reason: HOSPADM

## 2018-05-07 RX ORDER — FACIAL-BODY WIPES
10 EACH TOPICAL DAILY PRN
Status: DISCONTINUED | OUTPATIENT
Start: 2018-05-07 | End: 2018-05-08 | Stop reason: HOSPADM

## 2018-05-07 RX ORDER — ADHESIVE BANDAGE
30 BANDAGE TOPICAL DAILY PRN
Status: DISCONTINUED | OUTPATIENT
Start: 2018-05-07 | End: 2018-05-08 | Stop reason: HOSPADM

## 2018-05-07 RX ORDER — OXYCODONE AND ACETAMINOPHEN 5; 325 MG/1; MG/1
1 TABLET ORAL
Status: DISCONTINUED | OUTPATIENT
Start: 2018-05-07 | End: 2018-05-08 | Stop reason: HOSPADM

## 2018-05-07 RX ORDER — DOCUSATE SODIUM 100 MG/1
100 CAPSULE, LIQUID FILLED ORAL 2 TIMES DAILY
Status: DISCONTINUED | OUTPATIENT
Start: 2018-05-07 | End: 2018-05-08 | Stop reason: HOSPADM

## 2018-05-07 RX ADMIN — AMITRIPTYLINE HYDROCHLORIDE 25 MG: 25 TABLET, FILM COATED ORAL at 23:01

## 2018-05-07 RX ADMIN — CIPROFLOXACIN HYDROCHLORIDE 500 MG: 500 TABLET, FILM COATED ORAL at 13:08

## 2018-05-07 RX ADMIN — FAMOTIDINE 20 MG: 20 TABLET, FILM COATED ORAL at 18:38

## 2018-05-07 RX ADMIN — DOCUSATE SODIUM 100 MG: 100 CAPSULE, LIQUID FILLED ORAL at 18:38

## 2018-05-07 RX ADMIN — FAMOTIDINE 20 MG: 20 TABLET, FILM COATED ORAL at 08:49

## 2018-05-07 RX ADMIN — MORPHINE SULFATE 2 MG: 4 INJECTION INTRAVENOUS at 03:02

## 2018-05-07 RX ADMIN — OXYCODONE HYDROCHLORIDE AND ACETAMINOPHEN 1 TABLET: 5; 325 TABLET ORAL at 23:01

## 2018-05-07 RX ADMIN — OXYCODONE HYDROCHLORIDE AND ACETAMINOPHEN 1 TABLET: 5; 325 TABLET ORAL at 14:16

## 2018-05-07 RX ADMIN — CIPROFLOXACIN HYDROCHLORIDE 500 MG: 500 TABLET, FILM COATED ORAL at 23:01

## 2018-05-07 RX ADMIN — ONDANSETRON 4 MG: 4 TABLET, ORALLY DISINTEGRATING ORAL at 03:02

## 2018-05-07 RX ADMIN — DOCUSATE SODIUM 100 MG: 100 CAPSULE, LIQUID FILLED ORAL at 13:08

## 2018-05-07 RX ADMIN — OXYCODONE HYDROCHLORIDE AND ACETAMINOPHEN 1 TABLET: 5; 325 TABLET ORAL at 08:52

## 2018-05-07 RX ADMIN — ONDANSETRON 4 MG: 4 TABLET, ORALLY DISINTEGRATING ORAL at 21:09

## 2018-05-07 RX ADMIN — OXYCODONE HYDROCHLORIDE AND ACETAMINOPHEN 1 TABLET: 5; 325 TABLET ORAL at 18:38

## 2018-05-07 NOTE — ROUTINE PROCESS
Md called for chg in pain med, Med ordered for q8hr,pt xcalling for med and it has only been 4 hours since last dose of medication.

## 2018-05-07 NOTE — CDMP QUERY
Please clarify if you concur with Infectious Disease consult:    =>Sepsis (POA) d/t Ecoli bloodstream infection; m/l source is Left kidney pyelonephritis  =>Other Explanation of clinical findings  =>Unable to Determine (no explanation of clinical findings)    The medical record reflects the following:    ID consult: ID- Clinical presentation c/w partially treated sepsis (POA) due to e.coli bloodstream infection (positive blood culture 5/1), left kidney pyelonephritis. Most likely source of bloodstream infection is left kidney pyelonephritis. **Please note: In ICD10, bacteremia is an abnormal lab finding. It is not coded as sepsis and does not represent the clinical severity of sepsis. If you DECLINE this query or would like to communicate with [facility], please utilize the \"[facility] message box\" at the TOP of the Progress Note on the right.       Thank you,  Kamlesh Almonte RN/CCDS  [phone]

## 2018-05-07 NOTE — ROUTINE PROCESS
Bedside and Verbal shift change report given to Juanis BISWAS RN (oncoming nurse) by Gi Skinner (offgoing nurse). Report included the following information SBAR, Kardex and Recent Results.

## 2018-05-07 NOTE — PROGRESS NOTES
Infectious Disease Progress Note    Requested by: Dr. Xavi Elaine    Reason: sepsis, e.coli bacteremia, pyelonephritis    Current abx Prior abx   Ceftriaxone since 5/4/18 Pip/tazo, vancomycin 5/3/18-5/4/18     Lines:       Assessment :    33 y.o.  female with h/o bipolar disorder, recurrent UTI who was called back to the ER on 5/3/18 after blood cultures returned positive. Clinical presentation c/w partially treated sepsis (POA) due to e.coli bloodstream infection (positive blood culture 5/1), left kidney pyelonephritis. Most likely source of bloodstream infection is left kidney pyelonephritis. Persistent left flank pain/tenderness despite appropriate abx could be due to severe infection versus evolving left renal abscess. Recent ct scan 5/1 didn't reveal evidence of renal abscess. Will obtain usg to verify. Decreased wbc. Afebrile. Tolerating po diet    Recommendations:    1. D/c ceftriaxone. Start po ciprofloxacin  2. Obtain usg left kidney to evaluate for abscess  3. Will obtain ct scan left kidney if persistent left flank tenderness/pain in am. Should be ok to d/c patient in am if significant improvement in left flank pain and negative usg kidney    Advance Care planning: full code: discussed  with patient/surrogate decision maker: Alexandre Smith: [phone]    Above plan was discussed in details with patient,  and dr Xavi Elaine. Please call me if any further questions or concerns. Will continue to participate in the care of this patient. subjective:    Feels better. Had one episode of nausea yesterday. Patient denies headaches, visual disturbances, sore throat, runny nose, earaches, cp, sob, chills, cough, abdominal pain, diarrhea, burning micturition, pain or weakness in extremities. home Medication List    Details   amitriptyline (ELAVIL) 25 mg tablet Take 25 mg by mouth nightly.       oxyCODONE-acetaminophen (PERCOCET) 5-325 mg per tablet Take 1 Tab by mouth every four (4) hours as needed for Pain for up to 20 doses. Max Daily Amount: 6 Tabs. Qty: 15 Tab, Refills: 0    Associated Diagnoses: Pyelonephritis      trimethoprim-sulfamethoxazole (BACTRIM DS) 160-800 mg per tablet Take 1 Tab by mouth two (2) times a day for 10 days. Qty: 20 Tab, Refills: 0    Associated Diagnoses: Pyelonephritis      phenazopyridine (PYRIDIUM) 200 mg tablet Take 1 Tab by mouth three (3) times daily for 2 days. Qty: 6 Tab, Refills: 0    Associated Diagnoses: Pyelonephritis      QUEtiapine (SEROQUEL) 50 mg tablet Take 50 mg by mouth nightly. ibuprofen (MOTRIN) 600 mg tablet Take 1 Tab by mouth every six (6) hours as needed for Pain. Qty: 20 Tab, Refills: 0             Current Facility-Administered Medications   Medication Dose Route Frequency    butalbital-acetaminophen-caff (FIORICET) per capsule 1 Cap  1 Cap Oral Q8H PRN    cefTRIAXone (ROCEPHIN) 2 g in sterile water (preservative free) 20 mL IV syringe  2 g IntraVENous Q24H    amitriptyline (ELAVIL) tablet 25 mg  25 mg Oral QHS    ibuprofen (MOTRIN) tablet 600 mg  600 mg Oral Q6H PRN    oxyCODONE-acetaminophen (PERCOCET) 5-325 mg per tablet 1 Tab  1 Tab Oral Q8H PRN    ondansetron (ZOFRAN ODT) tablet 4 mg  4 mg Oral Q4H PRN    enoxaparin (LOVENOX) injection 40 mg  40 mg SubCUTAneous Q24H    morphine injection 2 mg  2 mg IntraVENous Q4H PRN    famotidine (PEPCID) tablet 20 mg  20 mg Oral BID       Allergies: Latex, natural rubber and Doxycycline    Temp (24hrs), Av.3 °F (36.8 °C), Min:97.9 °F (36.6 °C), Max:98.8 °F (37.1 °C)    Visit Vitals    /84 (BP 1 Location: Left arm, BP Patient Position: At rest)    Pulse 68    Temp 98 °F (36.7 °C)    Resp 18    Ht 4' 8\" (1.422 m)    Wt 43.1 kg (95 lb)    LMP 2018    SpO2 98%    Breastfeeding No    BMI 21.3 kg/m2       ROS: 12 point ROS obtained in details.  Pertinent positives as mentioned in HPI,   otherwise negative    Physical Exam:    General:  Alert, cooperative, no distress, appears stated age. Head:  Normocephalic, without obvious abnormality, atraumatic. Eyes:  Conjunctivae/corneas clear. PERRL, EOMs intact. Nose: Nares normal. No drainage or sinus tenderness. Throat: Lips, mucosa, and tongue normal.    Neck: Supple, symmetrical, trachea midline, no adenopathy   Back:   Decreased L CVA tenderness   Lungs:   Clear to auscultation bilaterally. Chest wall:  No tenderness or deformity. Heart:  Regular rate and rhythm, S1, S2 normal, no murmur, click, rub or gallop. Abdomen: Soft, Mild TTP LLQ. No rebound. Bowel sounds normal. No masses,  No organomegaly. Extremities: Extremities normal, atraumatic, no cyanosis or edema. Pulses: 2+ and symmetric all extremities. Skin: Skin color, texture, turgor normal. No rashes or lesions   Neurologic: CNII-XII intact. No focal motor or sensory deficit.             Labs: Results:   Chemistry Recent Labs      05/07/18   0349  05/06/18   0305  05/05/18   0413   GLU  104*  118*  104*   NA  139  139  140   K  3.6  3.2*  3.6   CL  102  104  106   CO2  33*  30  30   BUN  11  7  2*   CREA  0.59*  0.53*  0.57*   CA  8.7  8.5  7.6*   AGAP  4  5  4   BUCR  19  13  4*   AP   --    --   185*   TP   --    --   5.2*   ALB   --    --   2.2*   GLOB   --    --   3.0   AGRAT   --    --   0.7*      CBC w/Diff Recent Labs      05/05/18   0413   WBC  5.0   RBC  3.16*   HGB  10.0*   HCT  29.9*   PLT  174   GRANS  46   LYMPH  39   EOS  0      Microbiology No results for input(s): CULT in the last 72 hours.        RADIOLOGY:    All available imaging studies/reports in [facility] care for this admission were reviewed    Dr. Tita Fletcher, Infectious Disease Specialist  [phone]  May 7, 2018  10:20 AM

## 2018-05-08 ENCOUNTER — APPOINTMENT (OUTPATIENT)
Dept: CT IMAGING | Age: 28
DRG: 720 | End: 2018-05-08
Attending: FAMILY MEDICINE
Payer: MEDICAID

## 2018-05-08 VITALS
OXYGEN SATURATION: 99 % | WEIGHT: 95 LBS | SYSTOLIC BLOOD PRESSURE: 128 MMHG | DIASTOLIC BLOOD PRESSURE: 75 MMHG | RESPIRATION RATE: 18 BRPM | BODY MASS INDEX: 21.37 KG/M2 | HEART RATE: 67 BPM | TEMPERATURE: 98.3 F | HEIGHT: 56 IN

## 2018-05-08 LAB
ALBUMIN SERPL-MCNC: 2.7 G/DL (ref 3.4–5)
ALBUMIN/GLOB SERPL: 0.7 {RATIO} (ref 0.8–1.7)
ALP SERPL-CCNC: 215 U/L (ref 45–117)
ALT SERPL-CCNC: 73 U/L (ref 13–56)
ANION GAP SERPL CALC-SCNC: 5 MMOL/L (ref 3–18)
AST SERPL-CCNC: 21 U/L (ref 15–37)
BASOPHILS # BLD: 0 K/UL (ref 0–0.1)
BASOPHILS NFR BLD: 0 % (ref 0–2)
BILIRUB SERPL-MCNC: 0.3 MG/DL (ref 0.2–1)
BUN SERPL-MCNC: 12 MG/DL (ref 7–18)
BUN/CREAT SERPL: 17 (ref 12–20)
CALCIUM SERPL-MCNC: 8.4 MG/DL (ref 8.5–10.1)
CHLORIDE SERPL-SCNC: 101 MMOL/L (ref 100–108)
CO2 SERPL-SCNC: 32 MMOL/L (ref 21–32)
CREAT SERPL-MCNC: 0.69 MG/DL (ref 0.6–1.3)
DIFFERENTIAL METHOD BLD: ABNORMAL
EOSINOPHIL # BLD: 0.1 K/UL (ref 0–0.4)
EOSINOPHIL NFR BLD: 2 % (ref 0–5)
ERYTHROCYTE [DISTWIDTH] IN BLOOD BY AUTOMATED COUNT: 13 % (ref 11.6–14.5)
GLOBULIN SER CALC-MCNC: 4.1 G/DL (ref 2–4)
GLUCOSE SERPL-MCNC: 108 MG/DL (ref 74–99)
HCT VFR BLD AUTO: 36.3 % (ref 35–45)
HGB BLD-MCNC: 12.2 G/DL (ref 12–16)
LYMPHOCYTES # BLD: 3 K/UL (ref 0.9–3.6)
LYMPHOCYTES NFR BLD: 45 % (ref 21–52)
MAGNESIUM SERPL-MCNC: 2.3 MG/DL (ref 1.6–2.6)
MCH RBC QN AUTO: 32 PG (ref 24–34)
MCHC RBC AUTO-ENTMCNC: 33.6 G/DL (ref 31–37)
MCV RBC AUTO: 95.3 FL (ref 74–97)
MONOCYTES # BLD: 0.8 K/UL (ref 0.05–1.2)
MONOCYTES NFR BLD: 12 % (ref 3–10)
NEUTS SEG # BLD: 2.6 K/UL (ref 1.8–8)
NEUTS SEG NFR BLD: 41 % (ref 40–73)
PLATELET # BLD AUTO: 366 K/UL (ref 135–420)
PMV BLD AUTO: 10.3 FL (ref 9.2–11.8)
POTASSIUM SERPL-SCNC: 3.7 MMOL/L (ref 3.5–5.5)
PROT SERPL-MCNC: 6.8 G/DL (ref 6.4–8.2)
RBC # BLD AUTO: 3.81 M/UL (ref 4.2–5.3)
SODIUM SERPL-SCNC: 138 MMOL/L (ref 136–145)
WBC # BLD AUTO: 6.5 K/UL (ref 4.6–13.2)

## 2018-05-08 PROCEDURE — 36415 COLL VENOUS BLD VENIPUNCTURE: CPT | Performed by: FAMILY MEDICINE

## 2018-05-08 PROCEDURE — 74011250637 HC RX REV CODE- 250/637: Performed by: INTERNAL MEDICINE

## 2018-05-08 PROCEDURE — 74011636320 HC RX REV CODE- 636/320: Performed by: FAMILY MEDICINE

## 2018-05-08 PROCEDURE — 74177 CT ABD & PELVIS W/CONTRAST: CPT

## 2018-05-08 PROCEDURE — 83735 ASSAY OF MAGNESIUM: CPT | Performed by: FAMILY MEDICINE

## 2018-05-08 PROCEDURE — 85025 COMPLETE CBC W/AUTO DIFF WBC: CPT | Performed by: FAMILY MEDICINE

## 2018-05-08 PROCEDURE — 74011250637 HC RX REV CODE- 250/637: Performed by: FAMILY MEDICINE

## 2018-05-08 PROCEDURE — 80053 COMPREHEN METABOLIC PANEL: CPT | Performed by: FAMILY MEDICINE

## 2018-05-08 RX ORDER — DOCUSATE SODIUM 100 MG/1
100 CAPSULE, LIQUID FILLED ORAL 2 TIMES DAILY
Qty: 60 CAP | Refills: 2 | Status: SHIPPED | OUTPATIENT
Start: 2018-05-08 | End: 2018-08-06

## 2018-05-08 RX ORDER — FACIAL-BODY WIPES
10 EACH TOPICAL
Qty: 5 SUPPOSITORY | Refills: 0 | Status: SHIPPED | OUTPATIENT
Start: 2018-05-08 | End: 2019-06-24

## 2018-05-08 RX ORDER — ONDANSETRON 4 MG/1
4 TABLET, ORALLY DISINTEGRATING ORAL
Qty: 40 TAB | Refills: 0 | Status: SHIPPED | OUTPATIENT
Start: 2018-05-08 | End: 2019-06-24

## 2018-05-08 RX ORDER — FAMOTIDINE 20 MG/1
20 TABLET, FILM COATED ORAL 2 TIMES DAILY
Qty: 20 TAB | Refills: 0 | Status: SHIPPED | OUTPATIENT
Start: 2018-05-08 | End: 2019-06-24

## 2018-05-08 RX ORDER — OXYCODONE AND ACETAMINOPHEN 5; 325 MG/1; MG/1
1 TABLET ORAL
Qty: 28 TAB | Refills: 0 | Status: SHIPPED | OUTPATIENT
Start: 2018-05-08 | End: 2019-06-24

## 2018-05-08 RX ORDER — CIPROFLOXACIN 500 MG/1
500 TABLET ORAL EVERY 12 HOURS
Qty: 17 TAB | Refills: 0 | Status: SHIPPED | OUTPATIENT
Start: 2018-05-08 | End: 2018-05-17

## 2018-05-08 RX ADMIN — OXYCODONE HYDROCHLORIDE AND ACETAMINOPHEN 1 TABLET: 5; 325 TABLET ORAL at 10:18

## 2018-05-08 RX ADMIN — ONDANSETRON 4 MG: 4 TABLET, ORALLY DISINTEGRATING ORAL at 14:53

## 2018-05-08 RX ADMIN — DOCUSATE SODIUM 100 MG: 100 CAPSULE, LIQUID FILLED ORAL at 10:18

## 2018-05-08 RX ADMIN — IOPAMIDOL 70 ML: 612 INJECTION, SOLUTION INTRAVENOUS at 10:04

## 2018-05-08 RX ADMIN — CIPROFLOXACIN HYDROCHLORIDE 500 MG: 500 TABLET, FILM COATED ORAL at 10:19

## 2018-05-08 RX ADMIN — FAMOTIDINE 20 MG: 20 TABLET, FILM COATED ORAL at 10:18

## 2018-05-08 RX ADMIN — OXYCODONE HYDROCHLORIDE AND ACETAMINOPHEN 1 TABLET: 5; 325 TABLET ORAL at 14:53

## 2018-05-08 NOTE — PROGRESS NOTES
Problem: Pressure Injury - Risk of  Goal: *Prevention of pressure injury  Document Madhav Scale and appropriate interventions in the flowsheet.    Outcome: Progressing Towards Goal  Pressure Injury Interventions:                      Nutrition Interventions: Document food/fluid/supplement intake

## 2018-05-08 NOTE — ROUTINE PROCESS
Pt calling for pain medication and when arrived to room the patient is sleeping.  Had to wake the patient for medication

## 2018-05-08 NOTE — DISCHARGE INSTRUCTIONS
Patient Discharge Instructions    Christy Ozuna / [id number] : 1990    Admitted 2018 Discharged: 2018     · It is important that you take the medication exactly as they are prescribed. · Keep your medication in the bottles provided by the pharmacist and keep a list of the medication names, dosages, and times to be taken with you at all times. · Do not take other medications without consulting your doctor. What to do at Home    Do not take seroquel while taking this antibiotic medication. Recommended Diet: Regular Diet    Recommended Activity: Activity as tolerated    If you experience any of the following symptoms fever, inability to tolerate PO intake, severe abdominal or back pain, please follow up with ER. Signed By: Janelle Mendez MD     May 8, 2018           Patient armband removed and shredded  [employer] Activation    Thank you for requesting access to [employer]. Please follow the instructions below to securely access and download your online medical record. [employer] allows you to send messages to your doctor, view your test results, renew your prescriptions, schedule appointments, and more. How Do I Sign Up? 1. In your internet browser, go to [email or web address]  2. Click on the First Time User? Click Here link in the Sign In box. You will be redirect to the New Member Sign Up page. 3. Enter your [employer] Access Code exactly as it appears below. You will not need to use this code after youve completed the sign-up process. If you do not sign up before the expiration date, you must request a new code. [employer] Access Code: Activation code not generated  Current [employer] Status: Patient Declined (This is the date your [employer] access code will )    4. Enter the last four digits of your Social Security Number (xxxx) and Date of Birth (mm/dd/yyyy) as indicated and click Submit. You will be taken to the next sign-up page. 5. Create a [employer] ID.  This will be your [employer] login ID and cannot be changed, so think of one that is secure and easy to remember. 6. Create a [employer] password. You can change your password at any time. 7. Enter your Password Reset Question and Answer. This can be used at a later time if you forget your password. 8. Enter your e-mail address. You will receive e-mail notification when new information is available in [address] 9. Click Sign Up. You can now view and download portions of your medical record. 10. Click the Download Summary menu link to download a portable copy of your medical information. Additional Information    If you have questions, please visit the Frequently Asked Questions section of the [employer] website at [email or web address] Remember, [employer] is NOT to be used for urgent needs. For medical emergencies, dial 911. DISCHARGE SUMMARY from Nurse    PATIENT INSTRUCTIONS:    After general anesthesia or intravenous sedation, for 24 hours or while taking prescription Narcotics:  · Limit your activities  · Do not drive and operate hazardous machinery  · Do not make important personal or business decisions  · Do  not drink alcoholic beverages  · If you have not urinated within 8 hours after discharge, please contact your surgeon on call. Report the following to your surgeon:  · Excessive pain, swelling, redness or odor of or around the surgical area  · Temperature over 100.5  · Nausea and vomiting lasting longer than 4 hours or if unable to take medications  · Any signs of decreased circulation or nerve impairment to extremity: change in color, persistent  numbness, tingling, coldness or increase pain  · Any questions    What to do at Home:  Recommended activity: Activity as tolerated,     If you experience any of the following symptoms increased back pain not relieved with pain medication, shortness of breath, chest pain. Foul smelling urine, blood in urine, please follow up with ED or Primary Md.     * Please give a list of your current medications to your Primary Care Provider. *  Please update this list whenever your medications are discontinued, doses are      changed, or new medications (including over-the-counter products) are added. *  Please carry medication information at all times in case of emergency situations. These are general instructions for a healthy lifestyle:    No smoking/ No tobacco products/ Avoid exposure to second hand smoke  Surgeon General's Warning:  Quitting smoking now greatly reduces serious risk to your health. Obesity, smoking, and sedentary lifestyle greatly increases your risk for illness    A healthy diet, regular physical exercise & weight monitoring are important for maintaining a healthy lifestyle    You may be retaining fluid if you have a history of heart failure or if you experience any of the following symptoms:  Weight gain of 3 pounds or more overnight or 5 pounds in a week, increased swelling in our hands or feet or shortness of breath while lying flat in bed. Please call your doctor as soon as you notice any of these symptoms; do not wait until your next office visit. Recognize signs and symptoms of STROKE:    F-face looks uneven    A-arms unable to move or move unevenly    S-speech slurred or non-existent    T-time-call 911 as soon as signs and symptoms begin-DO NOT go       Back to bed or wait to see if you get better-TIME IS BRAIN. Warning Signs of HEART ATTACK     Call 911 if you have these symptoms:   Chest discomfort. Most heart attacks involve discomfort in the center of the chest that lasts more than a few minutes, or that goes away and comes back. It can feel like uncomfortable pressure, squeezing, fullness, or pain.  Discomfort in other areas of the upper body. Symptoms can include pain or discomfort in one or both arms, the back, neck, jaw, or stomach.  Shortness of breath with or without chest discomfort.    Other signs may include breaking out in a cold sweat, nausea, or lightheadedness. Don't wait more than five minutes to call 911 - MINUTES MATTER! Fast action can save your life. Calling 911 is almost always the fastest way to get lifesaving treatment. Emergency Medical Services staff can begin treatment when they arrive -- up to an hour sooner than if someone gets to the hospital by car. The discharge information has been reviewed with the patient. The patient verbalized understanding. Discharge medications reviewed with the patient and appropriate educational materials and side effects teaching were provided.   ___________________________________________________________________________________________________________________________________

## 2018-05-08 NOTE — DISCHARGE SUMMARY
Discharge Summary        Patient ID:        Lu Gore        [id number]        19 y.o.        1990        Admission Date: 5/2/2018      Discharge date and time: 5/8/2018        Inpatient care:   Problem that led to hospitalization: Active Problems:    Bacteremia (5/3/2018)      Pyelonephritis (5/3/2018)       Key findings and test results:  5/1/18 acute abdomen 1v  IMPRESSION:   1. No acute findings. 5/1/18 CT abd/pelv w/ contrast  IMPRESSION:     1. Left kidney patchy hypoenhancement, nonspecific, suspicious for  pyelonephritis. Recommend clinical correlation.     2. Mild perirectal/presacral fat stranding, similar to recent priors,  nonspecific. Correlate clinically. 5/7/18 renal ultrasound  IMPRESSION:     Normal retroperitoneal ultrasound. Hypoenhancement left kidney seen on CT not  visualized. No renal fluid collection. 5/8/18 CT ABd/Pelvis with IV contrast  IMPRESSION:      1. Patchy subtle striation with hypoenhancement noted in left kidney noted,  suggesting early pyelonephritis. Correlate clinically. No focal renal  abnormality or abscess seen.     2. Calcified granulomas in the liver and the spleen.                   Consults:ID and Urology             Final diagnoses (primary and secondary): <principal problem not specified>                                            Active Problems:    Bacteremia (5/3/2018)      Pyelonephritis (5/3/2018)            Brief hospital course   HPI:         \" Elier Mathews a 33 y.o.  female who was called back to the ER after blood cultures returned positive for admission with IV antibiotics.  Patient originally presented to ER on 5/1/18 for left flank pain, nauea, vomiting, similar to prior history of pancreatitis.  CT Abd and exam with findings of mild pyelonephritis, patient given IV cefepime in ER and discharged home with po bactrim and pain medications.  Patient reports taking medication as prescribed, still having significant left flank pain, having hesitency with urination but no burning, frequency or pain. Patient started on vancomycin and zosyn and admitted for IV antibiotic therapy pending culture results. \"    Bacteremia in the setting of Pyelonephritis. Patient started on vancomycin and zosyn IV empiric therapy, transitioned to ceftriaxone when culture results available. Blood cultures and urine cultures with e.coli pan sensitive. Blood cultures also with staph epidermidis thought to be more contaminant. ID consult recommended discontinue IV antibiotics and start PO ciprofloxacin for 10 days. Patient with continued left flank pain, had repeat renal ultrasound negative. Pain improved on day of discharge however still having pain and CVA tenderness concern for possible renal abscess CT Abd pelvis performed without finding of abscess. Discussed with ID consult, ok for discharge to home on PO antibiotic therapy. Patient tolerating PO intake, tolerating PO antibiotics, having some nausea controlled with antiemetics. Constipation. Patient started on colace, dulcolax as needed. Constipation resolved. Hypokalemia on admission resolved with supplementation    Anemia with low iron saturation. Improved on day of admission, recommend recheck, if still anemic would start iron therapy when abdominal pain resolved, monitor for consitpation. Bipolar/depression/anxiety. Continue elavil. Restart seroquel when completes cipro. Having some epigastric pain, history of pancreatitis in past.  Motrin discontinued for possible stomach upset and pepcid started. Discharge Exam:   Visit Vitals    /74 (BP 1 Location: Left arm, BP Patient Position: Sitting)    Pulse 78    Temp 98.2 °F (36.8 °C)    Resp 18    Ht 4' 8\" (1.422 m)    Wt 43.1 kg (95 lb)    LMP 04/25/2018    SpO2 97%    Breastfeeding No    BMI 21.3 kg/m2     General:  Alert, cooperative, no distress, appears stated age.    Head:  Normocephalic, without obvious abnormality, atraumatic. Eyes:  Conjunctivae/corneas clear. Nose: Nares normal. Septum midline. Throat: Lips, mucosa, and tongue normal.   Neck: Supple, symmetrical, trachea midline, no adenopathy   Back:   Symmetric, Mild Left CVA tenderness. Lungs:   Clear to auscultation bilaterally. Chest wall:  No tenderness or deformity. Heart:  Regular rate and rhythm, S1, S2 normal, no murmur, click, rub or gallop. Abdomen:   Soft, mild epigastric tenderness. Bowel sounds normal. No masses,  No organomegaly. Extremities: Extremities normal, atraumatic, no cyanosis or edema. Pulses: 2+ and symmetric all extremities. Skin: Skin color, texture, turgor normal. No rashes or lesions. Neurologic: CNII-XII intact. Postdischarge care/patient self-management          Medications at discharge  including reasons for change and indications for new ones:   Current Discharge Medication List      START taking these medications    Details   ciprofloxacin HCl (CIPRO) 500 mg tablet Take 1 Tab by mouth every twelve (12) hours for 17 doses. Qty: 17 Tab, Refills: 0      docusate sodium (COLACE) 100 mg capsule Take 1 Cap by mouth two (2) times a day for 90 days. Qty: 60 Cap, Refills: 2      ondansetron (ZOFRAN ODT) 4 mg disintegrating tablet Take 1 Tab by mouth every six (6) hours as needed. Qty: 40 Tab, Refills: 0      bisacodyl (DULCOLAX) 10 mg suppository Insert 10 mg into rectum daily as needed. Indications: constipation  Qty: 5 Suppository, Refills: 0      famotidine (PEPCID) 20 mg tablet Take 1 Tab by mouth two (2) times a day. Qty: 20 Tab, Refills: 0         CONTINUE these medications which have CHANGED    Details   oxyCODONE-acetaminophen (PERCOCET) 5-325 mg per tablet Take 1 Tab by mouth every six (6) hours as needed. Max Daily Amount: 4 Tabs.   Qty: 28 Tab, Refills: 0    Associated Diagnoses: Pyelonephritis         CONTINUE these medications which have NOT CHANGED    Details   amitriptyline (ELAVIL) 25 mg tablet Take 25 mg by mouth nightly. STOP taking these medications       trimethoprim-sulfamethoxazole (BACTRIM DS) 160-800 mg per tablet Comments:   Reason for Stopping:         phenazopyridine (PYRIDIUM) 200 mg tablet Comments:   Reason for Stopping:         QUEtiapine (SEROQUEL) 50 mg tablet Comments:   Reason for Stopping:         ibuprofen (MOTRIN) 600 mg tablet Comments:   Reason for Stopping:                   Pending laboratory work and tests: None        Condition at discharge and functional status: improved        Diet at discharge Regular Diet        Activities at discharge: as tolerated        Discharge destination: home    Advanced care plan    Full Code    Contact information/plan for follow up care      Follow-up with Dr. Delfina Carter or Dr. Jono Gupta in 3-5 days. Recommended follow up procedures and tests including imaging tests, infusion, etc with dates: cbc, cmp. Resume seroquel when completes cipro. Start iron therapy when pain resolved. Follow up with urology in 1-2 weeks.

## 2018-05-08 NOTE — PROGRESS NOTES
Progress Note    Patient: Susan Salinas MRN: [id number]  CSN: [id number]    YOB: 1990  Age: 33 y.o. Sex: female    DOA: 5/2/2018 LOS:  LOS: 5 days                    Subjective:   Pt had nausea and vomiting last night still c/o epigastric abdominal pain and Lt flank pain. Last BM last night 5/7/18. Discussed with ID Dr Arvin Denver she advised ultrasound or ct scan if she  Lt flank, ultrasound last night normal. Spoke with Dr. Arvin Denver and recommended CT with contrast abd and pelvic due to prolonged duration of symptoms, concerned for renal abscess. Review previous admission pt had pancreatitis EGD was negative for PUD and h pylori  Lipase 5/1 was [place] course  Susan Salinas is a 33 y.o.  female who was called back to the ER after blood cultures returned positive for admission with IV antibiotics. Patient originally presented to ER on 5/1/18 for left flank pain, nauea, vomiting, similar to prior history of pancreatitis. CT Abd and exam with findings of mild pyelonephritis, patient given IV cefepime in ER and discharged home with po bactrim and pain medications. Patient reports taking medication as prescribed, still having significant left flank pain, having hesitency with urination but no burning, frequency or pain. pt has chronic abdominal pain and seen by GI last admission and  Extensive work up.     Patient started on vancomycin and zosyn and admitted for IV antibiotic therapy pending culture results. Repeat blood culture is negative    CT scan abdomen     1. Left kidney patchy hypoenhancement, nonspecific, suspicious for  pyelonephritis. Recommend clinical correlation.     2. Mild perirectal/presacral fat stranding, similar to recent priors,  nonspecific. Correlate clinically. US Renal 5/7/18  IMPRESSION:     Normal retroperitoneal ultrasound. Hypoenhancement left kidney seen on CT not  visualized. No renal fluid collection.      repeat Iron stauration is 11  Repeat blood culture 5/1/18 negative     Seen by urology no further surgery needed as time    Chief Complaint:   Chief Complaint   Patient presents with    Other     positive blood cultures    Back Pain       Review of systems   GENERAL: Patient alert, awake and oriented times 3, able to communicate full sentences and not in distress. HEENT: No change in vision, no earache, tinnitus, sore throat or sinus congestion. NECK: No pain or stiffness. CARDIOVASCULAR: No chest pain or pressure. No palpitations. PULMONARY: No shortness of breath, cough or wheeze. GASTROINTESTINAL:Epigastric quadrant pain radiating from left flank, + nausea, vomiting last night . No diarrhea, melena or bright red blood per rectum. Constipation resolved. GENITOURINARY: No urinary frequency, urgency, or dysuria. MUSCULOSKELETAL: No joint or muscle pain, no back pain, no recent trauma. DERMATOLOGIC: No rash, no itching, no lesions. ENDOCRINE: No polyuria, polydipsia, no heat or cold intolerance. HEMATOLOGICAL: No anemia or easy bruising or bleeding. NEUROLOGIC: No headache, seizures, numbness, tingling or weakness. PSYCHIATRIC:  Hx of bipolar disorder, depression, anxiety feels controlled currently       Objective:     Physical Exam:  Visit Vitals    /74 (BP 1 Location: Right arm, BP Patient Position: At rest)    Pulse 63    Temp 98 °F (36.7 °C)    Resp 18    Ht 4' 8\" (1.422 m)    Wt 43.1 kg (95 lb)    SpO2 97%    Breastfeeding No    BMI 21.3 kg/m2            General:  Alert, cooperative, no distress, appears stated age. Head:  Normocephalic, without obvious abnormality, atraumatic. Eyes:  Conjunctivae/corneas clear. Nose: Nares normal. No drainage or sinus tenderness. Throat: Lips, mucosa, and tongue normal.    Neck: Supple, symmetrical, trachea midline, no adenopathy   Back:   +Lt CVAT   Lungs:   Clear to auscultation bilaterally. Chest wall:  No tenderness or deformity.    Heart:  Regular rate and rhythm, S1, S2 normal, no murmur, click, rub or gallop. Abdomen: Soft, Mild TTP epigastric quadrant. No rebound. Bowel sounds normal. No masses,  No organomegaly. Extremities: Extremities normal, atraumatic, no cyanosis or edema. Pulses: 2+ and symmetric all extremities. Skin: Skin color, texture, turgor normal. No rashes or lesions. Bruises from Lovenox injection   Neurologic: CNII-XII intact. No focal motor or sensory deficit.          Intake and Output:  Current Shift:     Last three shifts:  05/06 1901 - 05/08 0700  In: 420 [P.O.:420]  Out: -     Labs: Results:       Chemistry Recent Labs      05/08/18 0414 05/07/18   0349  05/06/18   0305   GLU  108*  104*  118*   NA  138  139  139   K  3.7  3.6  3.2*   CL  101  102  104   CO2  32  33*  30   BUN  12  11  7   CREA  0.69  0.59*  0.53*   CA  8.4*  8.7  8.5   AGAP  5  4  5   BUCR  17  19  13   AP  215*   --    --    TP  6.8   --    --    ALB  2.7*   --    --    GLOB  4.1*   --    --    AGRAT  0.7*   --    --       CBC w/Diff Recent Labs      05/08/18 0414   WBC  6.5   RBC  3.81*   HGB  12.2   HCT  36.3   PLT  366   GRANS  41   LYMPH  45   EOS  2      Cardiac Enzymes No results for input(s): CPK, CKND1, GILLES in the last 72 hours. No lab exists for component: CKRMB, TROIP   Coagulation No results for input(s): PTP, INR, APTT in the last 72 hours. No lab exists for component: INREXT, INREXT    Lipid Panel Lab Results   Component Value Date/Time    Triglyceride 106 03/24/2017 03:55 PM      BNP No results for input(s): BNPP in the last 72 hours.    Liver Enzymes Recent Labs      05/08/18 0414   TP  6.8   ALB  2.7*   AP  215*   SGOT  21      Thyroid Studies Lab Results   Component Value Date/Time    TSH 1.58 03/25/2017 03:10 AM          Procedures/imaging: see electronic medical records for all procedures/Xrays and details which were not copied into this note but were reviewed prior to creation of Plan    Medications:   Current Facility-Administered Medications   Medication Dose Route Frequency    docusate sodium (COLACE) capsule 100 mg  100 mg Oral BID    bisacodyl (DULCOLAX) suppository 10 mg  10 mg Rectal DAILY PRN    magnesium hydroxide (MILK OF MAGNESIA) 400 mg/5 mL oral suspension 30 mL  30 mL Oral DAILY PRN    ciprofloxacin HCl (CIPRO) tablet 500 mg  500 mg Oral Q12H    oxyCODONE-acetaminophen (PERCOCET) 5-325 mg per tablet 1 Tab  1 Tab Oral Q4H PRN    butalbital-acetaminophen-caff (FIORICET) per capsule 1 Cap  1 Cap Oral Q8H PRN    amitriptyline (ELAVIL) tablet 25 mg  25 mg Oral QHS    ondansetron (ZOFRAN ODT) tablet 4 mg  4 mg Oral Q4H PRN    enoxaparin (LOVENOX) injection 40 mg  40 mg SubCUTAneous Q24H    famotidine (PEPCID) tablet 20 mg  20 mg Oral BID       Assessment/Plan     Active Problems:    Bacteremia (5/3/2018)      Pyelonephritis (5/3/2018)      Pyelonephritis with bacteremia   CDMP clarification:  Sepsis (POA) d/t Ecoli bloodstream infection, most likely source is Left kidney pyelonephritis  - IV ABT Rocephin transitioned to PO ciprofloxacin yesterday- per pt has nausea and vomiting (one episode last night) but was able to tolerate PO ABT wihtout increased nausea or vomiting episode. Urine culture 5/1 E. Coli pansensitive. One bottle blood culture E. Coli pansensitive. Aerobic and Anaerobic blood cultures Staph Epi- probably contaminant. follow up repeat  Blood negative and urine culture  negative  - off IV fluid tolerating her diet  - pain control with percocet prn  - zofran prn nausea  - urology consult as outpatient  - discussed with ID Dr Georgina Momin, will get abd CT scan   - check lipase with am labs due to n/v and epigastric tenderness     Constipation- Resolved   Colace 100 mg BID  Dulcolax 10 mg supp daily prn  MOM 30  Cc qhs prn    Hypokalemia- Resolved  -  Potassium back to normal      Anemia  -improved, possible lab error/dehyrdation. BUN/Creatinine ratio inconsistent with dehydration. Will monitor. -iron saturation 11- start iron therapy when acute illness resolves.      Bipolar/depression/anxiety  - continue elavil 25mg qhs  - Restart Seroquel when she is stable     DVT/GI Prophylaxis: Lovenox/ pepcid     CODE:  FULL     SHANDA Bravo Res-S  5/8/2018 [id number]      I performed a history and physical exam on this patient with the student. I have reviewed the above progress note and agree with the assessment and plan. Patient desiring discharge to home, if normal CT scan possible dc this afternoon if able to tolerate PO meds without vomiting.     Sumaya Montano MD  5/8/18 [id number]

## 2018-05-09 LAB
BACTERIA SPEC CULT: NORMAL
BACTERIA SPEC CULT: NORMAL
SERVICE CMNT-IMP: NORMAL
SERVICE CMNT-IMP: NORMAL

## 2019-03-27 NOTE — PROGRESS NOTES
Infectious Disease Progress Note    Requested by: Dr. Jay Patel    Reason: sepsis, e.coli bacteremia, pyelonephritis    Current abx Prior abx   Ceftriaxone since 5/4/18 Pip/tazo, vancomycin 5/3/18-5/4/18     Lines:       Assessment :    33 y.o.  female with h/o bipolar disorder, recurrent UTI who was called back to the ER on 5/3/18 after blood cultures returned positive. Clinical presentation c/w partially treated sepsis (POA) due to e.coli bloodstream infection (positive blood culture 5/1), left kidney pyelonephritis. Most likely source of bloodstream infection is left kidney pyelonephritis. Persistent left flank pain/tenderness despite appropriate abx could be due to severe infection. Decreased wbc. Afebrile. Tolerating po diet. No evidence of renal abscess noted on usg 5/7, ct abd/pelvis 5/8    Recommendations:    1. cont po ciprofloxacin till 5/17/18  2. Ok to d/c patient from [address] [place] [address]: full code: discussed  with patient/surrogate decision maker: Charles Lambert: [phone]    Above plan was discussed in details with patient, dr. Eugene Moctezuma. Please call me if any further questions or concerns. Will continue to participate in the care of this patient. subjective:    Feels better. Had one episode of nausea yesterday. Patient denies headaches, visual disturbances, sore throat, runny nose, earaches, cp, sob, chills, cough, abdominal pain, diarrhea, burning micturition, pain or weakness in extremities. home Medication List    Details   amitriptyline (ELAVIL) 25 mg tablet Take 25 mg by mouth nightly. oxyCODONE-acetaminophen (PERCOCET) 5-325 mg per tablet Take 1 Tab by mouth every four (4) hours as needed for Pain for up to 20 doses. Max Daily Amount: 6 Tabs. Qty: 15 Tab, Refills: 0    Associated Diagnoses: Pyelonephritis      trimethoprim-sulfamethoxazole (BACTRIM DS) 160-800 mg per tablet Take 1 Tab by mouth two (2) times a day for 10 days.   Qty: 20 Tab, Refills: 0    Associated Diagnoses: Pyelonephritis      phenazopyridine (PYRIDIUM) 200 mg tablet Take 1 Tab by mouth three (3) times daily for 2 days. Qty: 6 Tab, Refills: 0    Associated Diagnoses: Pyelonephritis      QUEtiapine (SEROQUEL) 50 mg tablet Take 50 mg by mouth nightly. ibuprofen (MOTRIN) 600 mg tablet Take 1 Tab by mouth every six (6) hours as needed for Pain. Qty: 20 Tab, Refills: 0             Current Facility-Administered Medications   Medication Dose Route Frequency    docusate sodium (COLACE) capsule 100 mg  100 mg Oral BID    bisacodyl (DULCOLAX) suppository 10 mg  10 mg Rectal DAILY PRN    magnesium hydroxide (MILK OF MAGNESIA) 400 mg/5 mL oral suspension 30 mL  30 mL Oral DAILY PRN    ciprofloxacin HCl (CIPRO) tablet 500 mg  500 mg Oral Q12H    oxyCODONE-acetaminophen (PERCOCET) 5-325 mg per tablet 1 Tab  1 Tab Oral Q4H PRN    butalbital-acetaminophen-caff (FIORICET) per capsule 1 Cap  1 Cap Oral Q8H PRN    amitriptyline (ELAVIL) tablet 25 mg  25 mg Oral QHS    ondansetron (ZOFRAN ODT) tablet 4 mg  4 mg Oral Q4H PRN    enoxaparin (LOVENOX) injection 40 mg  40 mg SubCUTAneous Q24H    famotidine (PEPCID) tablet 20 mg  20 mg Oral BID       Allergies: Latex, natural rubber and Doxycycline    Temp (24hrs), Av.2 °F (36.8 °C), Min:97.7 °F (36.5 °C), Max:99 °F (37.2 °C)    Visit Vitals    /74 (BP 1 Location: Right arm, BP Patient Position: At rest)    Pulse 63    Temp 98 °F (36.7 °C)    Resp 18    Ht 4' 8\" (1.422 m)    Wt 43.1 kg (95 lb)    LMP 2018    SpO2 97%    Breastfeeding No    BMI 21.3 kg/m2       ROS: 12 point ROS obtained in details. Pertinent positives as mentioned in HPI,   otherwise negative    Physical Exam:    General:  Alert, cooperative, no distress, appears stated age. Head:  Normocephalic, without obvious abnormality, atraumatic. Eyes:  Conjunctivae/corneas clear. PERRL, EOMs intact.    Nose: Nares normal. No drainage or sinus tenderness. Throat: Lips, mucosa, and tongue normal.    Neck: Supple, symmetrical, trachea midline, no adenopathy   Back:   Decreased L CVA tenderness   Lungs:   Clear to auscultation bilaterally. Chest wall:  No tenderness or deformity. Heart:  Regular rate and rhythm, S1, S2 normal, no murmur, click, rub or gallop. Abdomen: Soft, resolved TTP LLQ. No rebound. Bowel sounds normal. No masses,  No organomegaly. Extremities: Extremities normal, atraumatic, no cyanosis or edema. Pulses: 2+ and symmetric all extremities. Skin: Skin color, texture, turgor normal. No rashes or lesions   Neurologic: CNII-XII intact. No focal motor or sensory deficit.             Labs: Results:   Chemistry Recent Labs      05/08/18   0414  05/07/18   0349  05/06/18   0305   GLU  108*  104*  118*   NA  138  139  139   K  3.7  3.6  3.2*   CL  101  102  104   CO2  32  33*  30   BUN  12  11  7   CREA  0.69  0.59*  0.53*   CA  8.4*  8.7  8.5   AGAP  5  4  5   BUCR  17  19  13   AP  215*   --    --    TP  6.8   --    --    ALB  2.7*   --    --    GLOB  4.1*   --    --    AGRAT  0.7*   --    --       CBC w/Diff Recent Labs      05/08/18   0414   WBC  6.5   RBC  3.81*   HGB  12.2   HCT  36.3   PLT  366   GRANS  41   LYMPH  45   EOS  2      Microbiology No results for input(s): CULT in the last 72 hours.        RADIOLOGY:    All available imaging studies/reports in [facility] for this admission were reviewed    Dr. Thomas Cheatham, Infectious Disease Specialist  [phone]  May 8, 2018  10:20 AM supine

## 2019-04-04 ENCOUNTER — HOSPITAL ENCOUNTER (EMERGENCY)
Age: 29
Discharge: STILL ADMITTED OR ADMINISTRATIVE | End: 2019-04-04
Attending: EMERGENCY MEDICINE
Payer: MEDICAID

## 2019-04-04 PROCEDURE — 75810000275 HC EMERGENCY DEPT VISIT NO LEVEL OF CARE

## 2019-06-24 ENCOUNTER — HOSPITAL ENCOUNTER (EMERGENCY)
Age: 29
Discharge: HOME | End: 2019-06-24
Attending: EMERGENCY MEDICINE
Payer: MEDICAID

## 2019-06-24 VITALS
BODY MASS INDEX: 25.87 KG/M2 | WEIGHT: 115 LBS | TEMPERATURE: 97.7 F | SYSTOLIC BLOOD PRESSURE: 135 MMHG | HEART RATE: 67 BPM | RESPIRATION RATE: 18 BRPM | DIASTOLIC BLOOD PRESSURE: 82 MMHG | HEIGHT: 56 IN | OXYGEN SATURATION: 98 %

## 2019-06-24 DIAGNOSIS — R10.13 ABDOMINAL PAIN, EPIGASTRIC: Primary | ICD-10-CM

## 2019-06-24 DIAGNOSIS — N30.01 ACUTE CYSTITIS WITH HEMATURIA: ICD-10-CM

## 2019-06-24 LAB
ALBUMIN SERPL-MCNC: 4.1 G/DL (ref 3.4–5)
ALBUMIN/GLOB SERPL: 1.1 {RATIO} (ref 0.8–1.7)
ALP SERPL-CCNC: 139 U/L (ref 45–117)
ALT SERPL-CCNC: 27 U/L (ref 13–56)
ANION GAP SERPL CALC-SCNC: 7 MMOL/L (ref 3–18)
APPEARANCE UR: ABNORMAL
AST SERPL-CCNC: 20 U/L (ref 15–37)
BACTERIA URNS QL MICRO: ABNORMAL /HPF
BASOPHILS # BLD: 0 K/UL (ref 0–0.1)
BASOPHILS NFR BLD: 0 % (ref 0–2)
BILIRUB SERPL-MCNC: 0.6 MG/DL (ref 0.2–1)
BILIRUB UR QL: NEGATIVE
BUN SERPL-MCNC: 20 MG/DL (ref 7–18)
BUN/CREAT SERPL: 24 (ref 12–20)
CALCIUM SERPL-MCNC: 9.1 MG/DL (ref 8.5–10.1)
CHLORIDE SERPL-SCNC: 105 MMOL/L (ref 100–108)
CO2 SERPL-SCNC: 29 MMOL/L (ref 21–32)
COLOR UR: ABNORMAL
CREAT SERPL-MCNC: 0.85 MG/DL (ref 0.6–1.3)
DIFFERENTIAL METHOD BLD: ABNORMAL
EOSINOPHIL # BLD: 0 K/UL (ref 0–0.4)
EOSINOPHIL NFR BLD: 0 % (ref 0–5)
EPITH CASTS URNS QL MICRO: ABNORMAL /LPF (ref 0–5)
ERYTHROCYTE [DISTWIDTH] IN BLOOD BY AUTOMATED COUNT: 13.2 % (ref 11.6–14.5)
GLOBULIN SER CALC-MCNC: 3.9 G/DL (ref 2–4)
GLUCOSE SERPL-MCNC: 88 MG/DL (ref 74–99)
GLUCOSE UR STRIP.AUTO-MCNC: NEGATIVE MG/DL
HCG UR QL: NEGATIVE
HCT VFR BLD AUTO: 40.5 % (ref 35–45)
HGB BLD-MCNC: 14 G/DL (ref 12–16)
HGB UR QL STRIP: ABNORMAL
KETONES UR QL STRIP.AUTO: ABNORMAL MG/DL
LACTATE BLD-SCNC: 0.51 MMOL/L (ref 0.4–2)
LEUKOCYTE ESTERASE UR QL STRIP.AUTO: ABNORMAL
LIPASE SERPL-CCNC: 138 U/L (ref 73–393)
LYMPHOCYTES # BLD: 3.1 K/UL (ref 0.9–3.6)
LYMPHOCYTES NFR BLD: 20 % (ref 21–52)
MCH RBC QN AUTO: 32.5 PG (ref 24–34)
MCHC RBC AUTO-ENTMCNC: 34.6 G/DL (ref 31–37)
MCV RBC AUTO: 94 FL (ref 74–97)
MONOCYTES # BLD: 1.4 K/UL (ref 0.05–1.2)
MONOCYTES NFR BLD: 9 % (ref 3–10)
MUCOUS THREADS URNS QL MICRO: ABNORMAL /LPF
NEUTS SEG # BLD: 11.2 K/UL (ref 1.8–8)
NEUTS SEG NFR BLD: 71 % (ref 40–73)
NITRITE UR QL STRIP.AUTO: NEGATIVE
PH UR STRIP: 6 [PH] (ref 5–8)
PLATELET # BLD AUTO: 223 K/UL (ref 135–420)
PMV BLD AUTO: 9.7 FL (ref 9.2–11.8)
POTASSIUM SERPL-SCNC: 3.5 MMOL/L (ref 3.5–5.5)
PROT SERPL-MCNC: 8 G/DL (ref 6.4–8.2)
PROT UR STRIP-MCNC: 30 MG/DL
RBC # BLD AUTO: 4.31 M/UL (ref 4.2–5.3)
RBC #/AREA URNS HPF: ABNORMAL /HPF (ref 0–5)
SODIUM SERPL-SCNC: 141 MMOL/L (ref 136–145)
SP GR UR REFRACTOMETRY: >1.03 (ref 1–1.03)
UROBILINOGEN UR QL STRIP.AUTO: 1 EU/DL (ref 0.2–1)
WBC # BLD AUTO: 15.7 K/UL (ref 4.6–13.2)
WBC URNS QL MICRO: ABNORMAL /HPF (ref 0–4)

## 2019-06-24 PROCEDURE — 74011250636 HC RX REV CODE- 250/636: Performed by: PHYSICIAN ASSISTANT

## 2019-06-24 PROCEDURE — 96375 TX/PRO/DX INJ NEW DRUG ADDON: CPT

## 2019-06-24 PROCEDURE — 83690 ASSAY OF LIPASE: CPT

## 2019-06-24 PROCEDURE — 96361 HYDRATE IV INFUSION ADD-ON: CPT

## 2019-06-24 PROCEDURE — 81025 URINE PREGNANCY TEST: CPT

## 2019-06-24 PROCEDURE — 87086 URINE CULTURE/COLONY COUNT: CPT

## 2019-06-24 PROCEDURE — 99283 EMERGENCY DEPT VISIT LOW MDM: CPT

## 2019-06-24 PROCEDURE — 81001 URINALYSIS AUTO W/SCOPE: CPT

## 2019-06-24 PROCEDURE — 85025 COMPLETE CBC W/AUTO DIFF WBC: CPT

## 2019-06-24 PROCEDURE — 80053 COMPREHEN METABOLIC PANEL: CPT

## 2019-06-24 PROCEDURE — 96374 THER/PROPH/DIAG INJ IV PUSH: CPT

## 2019-06-24 PROCEDURE — 83605 ASSAY OF LACTIC ACID: CPT

## 2019-06-24 RX ORDER — CEPHALEXIN 500 MG/1
500 CAPSULE ORAL 2 TIMES DAILY
Qty: 14 CAP | Refills: 0 | Status: SHIPPED | OUTPATIENT
Start: 2019-06-24 | End: 2019-07-01

## 2019-06-24 RX ORDER — ONDANSETRON 2 MG/ML
4 INJECTION INTRAMUSCULAR; INTRAVENOUS
Status: COMPLETED | OUTPATIENT
Start: 2019-06-24 | End: 2019-06-24

## 2019-06-24 RX ORDER — FAMOTIDINE 20 MG/1
20 TABLET, FILM COATED ORAL 2 TIMES DAILY
Qty: 20 TAB | Refills: 0 | Status: SHIPPED | OUTPATIENT
Start: 2019-06-24 | End: 2019-07-04

## 2019-06-24 RX ORDER — ONDANSETRON 4 MG/1
TABLET, ORALLY DISINTEGRATING ORAL
Qty: 10 TAB | Refills: 0 | OUTPATIENT
Start: 2019-06-24 | End: 2019-10-16

## 2019-06-24 RX ORDER — MORPHINE SULFATE 2 MG/ML
2 INJECTION, SOLUTION INTRAMUSCULAR; INTRAVENOUS
Status: COMPLETED | OUTPATIENT
Start: 2019-06-24 | End: 2019-06-24

## 2019-06-24 RX ADMIN — SODIUM CHLORIDE 1000 ML: 900 INJECTION, SOLUTION INTRAVENOUS at 15:53

## 2019-06-24 RX ADMIN — MORPHINE SULFATE 2 MG: 2 INJECTION, SOLUTION INTRAMUSCULAR; INTRAVENOUS at 15:56

## 2019-06-24 RX ADMIN — ONDANSETRON 4 MG: 2 INJECTION INTRAMUSCULAR; INTRAVENOUS at 15:54

## 2019-06-24 NOTE — ED PROVIDER NOTES
EMERGENCY DEPARTMENT HISTORY AND PHYSICAL EXAM    4:35 PM      Date: 6/24/2019  Patient Name: Mary Becker    History of Presenting Illness     Chief Complaint   Patient presents with    Abdominal Pain    Flank Pain         History Provided By: Patient  Additional History (Context): Mary Becker is a 30 y.o. female with hx of bipolar d/o, pancreatitis, drug abuse who presents with c/o intermittent epigastric abdominal pain associated with nausea and vomiting for 2 days. Notes the symptoms started after drinking alcohol, drinks 1 bottle of liquor each day. Notes symptoms feel consistent with previous episodes of pancreatitis. Denies fever or chills, chest pain, shortness of breath, diarrhea, dysuria, hematuria, hematemesis. Did not take any medication for symptoms PTA. PCP: Shai Anderson MD    Current Facility-Administered Medications   Medication Dose Route Frequency Provider Last Rate Last Dose    sodium chloride 0.9 % bolus infusion 1,000 mL  1,000 mL IntraVENous ONCE Rashmi Hoyos PA 1,000 mL/hr at 06/24/19 1553 1,000 mL at 06/24/19 1553     Current Outpatient Medications   Medication Sig Dispense Refill    famotidine (PEPCID) 20 mg tablet Take 1 Tab by mouth two (2) times a day for 10 days. 20 Tab 0    ondansetron (ZOFRAN ODT) 4 mg disintegrating tablet Take 1-2 tablets every 6-8 hours as needed for nausea and vomiting. 10 Tab 0    cephALEXin (KEFLEX) 500 mg capsule Take 1 Cap by mouth two (2) times a day for 7 days. 14 Cap 0    amitriptyline (ELAVIL) 25 mg tablet Take 25 mg by mouth nightly.          Past History     Past Medical History:  Past Medical History:   Diagnosis Date    Bipolar 1 disorder, depressed ([facility] [facility]     Cocaine abuse ([facility] [address] [facility]     Neurological disorder     Pancreatitis     Psychiatric disorder        Past Surgical History:  Past Surgical History:   Procedure Laterality Date    HX PELVIC LAPAROSCOPY      for perforated IUD removal    UPPER GI ENDOSCOPY,BIOPSY 3/29/2017            Family History:  History reviewed. No pertinent family history. Social History:  Social History     Tobacco Use    Smoking status: Never Smoker    Smokeless tobacco: Never Used   Substance Use Topics    Alcohol use: Yes     Alcohol/week: 0.5 oz     Types: 1 Standard drinks or equivalent per week     Comment: 5-6 shots/week    Drug use: Yes     Frequency: 7.0 times per week     Types: Marijuana       Allergies: Allergies   Allergen Reactions    Latex, Natural Rubber Rash    Doxycycline Hives         Review of Systems       Review of Systems   Constitutional: Negative for chills and fever. Respiratory: Negative for shortness of breath. Cardiovascular: Negative for chest pain. Gastrointestinal: Positive for abdominal pain, nausea and vomiting. Skin: Negative for rash. Neurological: Negative for weakness. All other systems reviewed and are negative. Physical Exam     Visit Vitals  /82 (BP 1 Location: Left arm, BP Patient Position: Sitting)   Pulse 67   Temp 97.7 °F (36.5 °C)   Resp 18   Ht 4' 8\" (1.422 m)   Wt 52.2 kg (115 lb)   SpO2 98%   BMI 25.78 kg/m²         Physical Exam   Constitutional: She appears well-developed and well-nourished. No distress. HENT:   Head: Normocephalic and atraumatic. Neck: Normal range of motion. Neck supple. Cardiovascular: Normal rate, regular rhythm, normal heart sounds and intact distal pulses. Exam reveals no gallop and no friction rub. No murmur heard. Pulmonary/Chest: Effort normal and breath sounds normal. No respiratory distress. She has no wheezes. She has no rales. Abdominal: Soft. Bowel sounds are normal. She exhibits no distension and no mass. There is tenderness (mild epigastric tenderness to palpation, RLQ non-tender to palpation). There is no rebound and no guarding. Musculoskeletal: Normal range of motion. Neurological: She is alert. Skin: Skin is warm. No rash noted. She is not diaphoretic.    Nursing note and vitals reviewed. Diagnostic Study Results     Labs -  Recent Results (from the past 12 hour(s))   CBC WITH AUTOMATED DIFF    Collection Time: 06/24/19  2:23 PM   Result Value Ref Range    WBC 15.7 (H) 4.6 - 13.2 K/uL    RBC 4.31 4.20 - 5.30 M/uL    HGB 14.0 12.0 - 16.0 g/dL    HCT 40.5 35.0 - 45.0 %    MCV 94.0 74.0 - 97.0 FL    MCH 32.5 24.0 - 34.0 PG    MCHC 34.6 31.0 - 37.0 g/dL    RDW 13.2 11.6 - 14.5 %    PLATELET 279 769 - 486 K/uL    MPV 9.7 9.2 - 11.8 FL    NEUTROPHILS 71 40 - 73 %    LYMPHOCYTES 20 (L) 21 - 52 %    MONOCYTES 9 3 - 10 %    EOSINOPHILS 0 0 - 5 %    BASOPHILS 0 0 - 2 %    ABS. NEUTROPHILS 11.2 (H) 1.8 - 8.0 K/UL    ABS. LYMPHOCYTES 3.1 0.9 - 3.6 K/UL    ABS. MONOCYTES 1.4 (H) 0.05 - 1.2 K/UL    ABS. EOSINOPHILS 0.0 0.0 - 0.4 K/UL    ABS. BASOPHILS 0.0 0.0 - 0.1 K/UL    DF AUTOMATED     METABOLIC PANEL, COMPREHENSIVE    Collection Time: 06/24/19  2:23 PM   Result Value Ref Range    Sodium 141 136 - 145 mmol/L    Potassium 3.5 3.5 - 5.5 mmol/L    Chloride 105 100 - 108 mmol/L    CO2 29 21 - 32 mmol/L    Anion gap 7 3.0 - 18 mmol/L    Glucose 88 74 - 99 mg/dL    BUN 20 (H) 7.0 - 18 MG/DL    Creatinine 0.85 0.6 - 1.3 MG/DL    BUN/Creatinine ratio 24 (H) 12 - 20      GFR est AA >60 >60 ml/min/1.73m2    GFR est non-AA >60 >60 ml/min/1.73m2    Calcium 9.1 8.5 - 10.1 MG/DL    Bilirubin, total 0.6 0.2 - 1.0 MG/DL    ALT (SGPT) 27 13 - 56 U/L    AST (SGOT) 20 15 - 37 U/L    Alk.  phosphatase 139 (H) 45 - 117 U/L    Protein, total 8.0 6.4 - 8.2 g/dL    Albumin 4.1 3.4 - 5.0 g/dL    Globulin 3.9 2.0 - 4.0 g/dL    A-G Ratio 1.1 0.8 - 1.7     URINALYSIS W/ RFLX MICROSCOPIC    Collection Time: 06/24/19  2:23 PM   Result Value Ref Range    Color DARK YELLOW      Appearance CLOUDY      Specific gravity >1.030 (H) 1.005 - 1.030    pH (UA) 6.0 5.0 - 8.0      Protein 30 (A) NEG mg/dL    Glucose NEGATIVE  NEG mg/dL    Ketone TRACE (A) NEG mg/dL    Bilirubin NEGATIVE  NEG      Blood MODERATE (A) NEG Urobilinogen 1.0 0.2 - 1.0 EU/dL    Nitrites NEGATIVE  NEG      Leukocyte Esterase SMALL (A) NEG     HCG URINE, QL    Collection Time: 06/24/19  2:23 PM   Result Value Ref Range    HCG urine, QL NEGATIVE  NEG     LIPASE    Collection Time: 06/24/19  2:23 PM   Result Value Ref Range    Lipase 138 73 - 393 U/L   URINE MICROSCOPIC ONLY    Collection Time: 06/24/19  2:23 PM   Result Value Ref Range    WBC 0 to 3 0 - 4 /hpf    RBC 11 to 20 0 - 5 /hpf    Epithelial cells 3+ 0 - 5 /lpf    Bacteria 4+ (A) NEG /hpf    Mucus 4+ (A) NEG /lpf   POC LACTIC ACID    Collection Time: 06/24/19  3:55 PM   Result Value Ref Range    Lactic Acid (POC) 0.51 0.40 - 2.00 mmol/L       Radiologic Studies -   No orders to display         Medical Decision Making   I am the first provider for this patient. I reviewed the vital signs, available nursing notes, past medical history, past surgical history, family history and social history. Vital Signs-Reviewed the patient's vital signs. Records Reviewed: Nursing Notes and Old Medical Records (Time of Review: 4:35 PM)    ED Course: Progress Notes, Reevaluation, and Consults:  4:35 PM  Reviewed results with patient. Resting comfortably, no distress. Feels better. Discussed need for close outpatient follow-up within a week. Discussed strict return precautions, including fever, vomiting, or any other medical concerns. Provider Notes (Medical Decision Making): 28-year-old female who presents due to epigastric abdominal pain associated with nausea and vomiting for 2 days. Afebrile, nontoxic-appearing, looks well. Labs demonstrate leukocytosis, lactic within normal limits. No evidence of elevated LFTs or lipase. UA with bacteriuria, will treat and send culture. Patient feeling better, tolerating p.o. Do not feel imaging is warranted at this time. Will discharge with symptomatic management and close outpatient follow-up      Diagnosis     Clinical Impression:   1.  Abdominal pain, epigastric    2. Acute cystitis with hematuria        Disposition: home     Follow-up Information     Follow up With Specialties Details Why [address]    [facility] EMERGENCY DEPT Emergency Medicine  If symptoms worsen [address]  [phone]    Deena Garcia MD Family Practice In 2 days  [address] [facility]  [phone]             Patient's Medications   Start Taking    CEPHALEXIN (KEFLEX) 500 MG CAPSULE    Take 1 Cap by mouth two (2) times a day for 7 days. FAMOTIDINE (PEPCID) 20 MG TABLET    Take 1 Tab by mouth two (2) times a day for 10 days. ONDANSETRON (ZOFRAN ODT) 4 MG DISINTEGRATING TABLET    Take 1-2 tablets every 6-8 hours as needed for nausea and vomiting. Continue Taking    AMITRIPTYLINE (ELAVIL) 25 MG TABLET    Take 25 mg by mouth nightly. These Medications have changed    No medications on file   Stop Taking    BISACODYL (DULCOLAX) 10 MG SUPPOSITORY    Insert 10 mg into rectum daily as needed. Indications: constipation    FAMOTIDINE (PEPCID) 20 MG TABLET    Take 1 Tab by mouth two (2) times a day. ONDANSETRON (ZOFRAN ODT) 4 MG DISINTEGRATING TABLET    Take 1 Tab by mouth every six (6) hours as needed. OXYCODONE-ACETAMINOPHEN (PERCOCET) 5-325 MG PER TABLET    Take 1 Tab by mouth every six (6) hours as needed. Max Daily Amount: 4 Tabs. Dictation disclaimer:  Please note that this dictation was completed with [employer], the computer voice recognition software. Quite often unanticipated grammatical, syntax, homophones, and other interpretive errors are inadvertently transcribed by the computer software. Please disregard these errors. Please excuse any errors that have escaped final proofreading.

## 2019-06-24 NOTE — DISCHARGE INSTRUCTIONS
Patient Education      Take medication as prescribed. Follow-up with your primary care physician within 2 days for reassessment. Bring the results from this visit with you for their review. Return to the ED immediately for any new, worsening, or persistent symptoms, including fever, vomiting, or any other medical concerns. Abdominal Pain: Care Instructions  Your Care Instructions    Abdominal pain has many possible causes. Some aren't serious and get better on their own in a few days. Others need more testing and treatment. If your pain continues or gets worse, you need to be rechecked and may need more tests to find out what is wrong. You may need surgery to correct the problem. Don't ignore new symptoms, such as fever, nausea and vomiting, urination problems, pain that gets worse, and dizziness. These may be signs of a more serious problem. Your doctor may have recommended a follow-up visit in the next 8 to 12 hours. If you are not getting better, you may need more tests or treatment. The doctor has checked you carefully, but problems can develop later. If you notice any problems or new symptoms, get medical treatment right away. Follow-up care is a key part of your treatment and safety. Be sure to make and go to all appointments, and call your doctor if you are having problems. It's also a good idea to know your test results and keep a list of the medicines you take. How can you care for yourself at home? · Rest until you feel better. · To prevent dehydration, drink plenty of fluids, enough so that your urine is light yellow or clear like water. Choose water and other caffeine-free clear liquids until you feel better. If you have kidney, heart, or liver disease and have to limit fluids, talk with your doctor before you increase the amount of fluids you drink. · If your stomach is upset, eat mild foods, such as rice, dry toast or crackers, bananas, and applesauce.  Try eating several small meals instead of two or three large ones. · Wait until 48 hours after all symptoms have gone away before you have spicy foods, alcohol, and drinks that contain caffeine. · Do not eat foods that are high in fat. · Avoid anti-inflammatory medicines such as aspirin, ibuprofen (Advil, Motrin), and naproxen (Aleve). These can cause stomach upset. Talk to your doctor if you take daily aspirin for another health problem. When should you call for help? Call 911 anytime you think you may need emergency care. For example, call if:    · You passed out (lost consciousness).     · You pass maroon or very bloody stools.     · You vomit blood or what looks like coffee grounds.     · You have new, severe belly pain.    Call your doctor now or seek immediate medical care if:    · Your pain gets worse, especially if it becomes focused in one area of your belly.     · You have a new or higher fever.     · Your stools are black and look like tar, or they have streaks of blood.     · You have unexpected vaginal bleeding.     · You have symptoms of a urinary tract infection. These may include:  ? Pain when you urinate. ? Urinating more often than usual.  ? Blood in your urine.     · You are dizzy or lightheaded, or you feel like you may faint.    Watch closely for changes in your health, and be sure to contact your doctor if:    · You are not getting better after 1 day (24 hours). Where can you learn more? Go to http://gerardo-stan.info/. Enter T518 in the search box to learn more about \"Abdominal Pain: Care Instructions. \"  Current as of: September 23, 2018  Content Version: 11.9  © 5389-7998 [employer]. Care instructions adapted under license by [employer] (which disclaims liability or warranty for this information).  If you have questions about a medical condition or this instruction, always ask your healthcare professional. [place] [address] any warranty or liability for your use of this information. Patient Education        Indigestion (Dyspepsia or Heartburn): Care Instructions  Your Care Instructions  Sometimes it can be hard to pinpoint the cause of indigestion. (It is also called dyspepsia or heartburn.) Most cases of an upset stomach with bloating, burning, burping, and nausea are minor and go away within several hours. Home treatment and over-the-counter medicine often are able to control symptoms. But if you take medicine to relieve your indigestion without making diet and lifestyle changes, your symptoms are likely to return again and again. If you get indigestion often, it may be a sign of a more serious medical problem. Be sure to follow up with your doctor, who may want to do tests to be sure of the cause of your indigestion. Follow-up care is a key part of your treatment and safety. Be sure to make and go to all appointments, and call your doctor if you are having problems. It's also a good idea to know your test results and keep a list of the medicines you take. How can you care for yourself at home? · Your doctor may recommend over-the-counter medicine. For mild or occasional indigestion, antacids such as Gaviscon, Mylanta, Maalox, or Tums, may help. Be safe with medicines. Be careful when you take over-the-counter antacid medicines. Many of these medicines have aspirin in them. Read the label to make sure that you are not taking more than the recommended dose. Too much aspirin can be harmful. · Your doctor also may recommend over-the-counter acid reducers, such as Pepcid AC, Tagamet HB, Zantac 75, or Prilosec. Read and follow all instructions on the label. If you use these medicines often, talk with your doctor. · Change your eating habits. ? It's best to eat several small meals instead of two or three large meals. ? After you eat, wait 2 to 3 hours before you lie down. ? Chocolate, mint, and alcohol can make GERD worse. ?  Spicy foods, foods that have a lot of acid (like tomatoes and oranges), and coffee can make GERD symptoms worse in some people. If your symptoms are worse after you eat a certain food, you may want to stop eating that food to see if your symptoms get better. · Do not smoke or chew tobacco. Smoking can make GERD worse. If you need help quitting, talk to your doctor about stop-smoking programs and medicines. These can increase your chances of quitting for good. · If you have GERD symptoms at night, raise the head of your bed 6 to 8 inches. You can do this by putting the frame on blocks or placing a foam wedge under the head of your mattress. (Adding extra pillows does not work.)  · Do not wear tight clothing around your middle. · Lose weight if you need to. Losing just 5 to 10 pounds can help. · Do not take anti-inflammatory medicines, such as aspirin, ibuprofen (Advil, Motrin), or naproxen (Aleve). These can irritate the stomach. If you need a pain medicine, try acetaminophen (Tylenol), which does not cause stomach upset. When should you call for help? Call your doctor now or seek immediate medical care if:    · You have new or worse belly pain.     · You are vomiting.    Watch closely for changes in your health, and be sure to contact your doctor if:    · You have new or worse symptoms of indigestion.     · You have trouble or pain swallowing.     · You are losing weight.     · You do not get better as expected. Where can you learn more? Go to http://gerardo-stan.info/. Enter U986 in the search box to learn more about \"Indigestion (Dyspepsia or Heartburn): Care Instructions. \"  Current as of: March 27, 2018  Content Version: 11.9  © 3817-7361 [employer]. Care instructions adapted under license by [employer] (which disclaims liability or warranty for this information).  If you have questions about a medical condition or this instruction, always ask your healthcare professional. [employer] disclaims any warranty or liability for your use of this information. Patient Education        Urinary Tract Infection in Women: Care Instructions  Your Care Instructions    A urinary tract infection, or UTI, is a general term for an infection anywhere between the kidneys and the urethra (where urine comes out). Most UTIs are bladder infections. They often cause pain or burning when you urinate. UTIs are caused by bacteria and can be cured with antibiotics. Be sure to complete your treatment so that the infection goes away. Follow-up care is a key part of your treatment and safety. Be sure to make and go to all appointments, and call your doctor if you are having problems. It's also a good idea to know your test results and keep a list of the medicines you take. How can you care for yourself at home? · Take your antibiotics as directed. Do not stop taking them just because you feel better. You need to take the full course of antibiotics. · Drink extra water and other fluids for the next day or two. This may help wash out the bacteria that are causing the infection. (If you have kidney, heart, or liver disease and have to limit fluids, talk with your doctor before you increase your fluid intake.)  · Avoid drinks that are carbonated or have caffeine. They can irritate the bladder. · Urinate often. Try to empty your bladder each time. · To relieve pain, take a hot bath or lay a heating pad set on low over your lower belly or genital area. Never go to sleep with a heating pad in place. To prevent UTIs  · Drink plenty of water each day. This helps you urinate often, which clears bacteria from your system. (If you have kidney, heart, or liver disease and have to limit fluids, talk with your doctor before you increase your fluid intake.)  · Urinate when you need to. · Urinate right after you have sex. · Change sanitary pads often.   · Avoid douches, bubble baths, feminine hygiene sprays, and other feminine hygiene products that have deodorants. · After going to the bathroom, wipe from front to back. When should you call for help? Call your doctor now or seek immediate medical care if:    · Symptoms such as fever, chills, nausea, or vomiting get worse or appear for the first time.     · You have new pain in your back just below your rib cage. This is called flank pain.     · There is new blood or pus in your urine.     · You have any problems with your antibiotic medicine.    Watch closely for changes in your health, and be sure to contact your doctor if:    · You are not getting better after taking an antibiotic for 2 days.     · Your symptoms go away but then come back. Where can you learn more? Go to http://gerardo-stan.info/. Enter V911 in the search box to learn more about \"Urinary Tract Infection in Women: Care Instructions. \"  Current as of: March 20, 2018  Content Version: 11.9  © 2012-7839 [employer]. Care instructions adapted under license by [employer] (which disclaims liability or warranty for this information). If you have questions about a medical condition or this instruction, always ask your healthcare professional. [address] any warranty or liability for your use of this information.

## 2019-06-24 NOTE — ED NOTES
I performed a brief evaluation, including history and physical, of the patient here in triage and I have determined that pt will need further treatment and evaluation from the main side ER physician. I have placed initial orders to help in expediting patients care.      June 24, 2019 at 12:23 PM - Loc Sharp PA-C        Visit Vitals  /82 (BP 1 Location: Left arm, BP Patient Position: Sitting)   Pulse 67   Temp 97.7 °F (36.5 °C)   Resp 18   Ht 4' 8\" (1.422 m)   Wt 52.2 kg (115 lb)   SpO2 98%   BMI 25.78 kg/m²

## 2019-06-26 LAB
BACTERIA SPEC CULT: NORMAL
SERVICE CMNT-IMP: NORMAL

## 2019-09-09 ENCOUNTER — HOSPITAL ENCOUNTER (EMERGENCY)
Age: 29
Discharge: HOME | End: 2019-09-09
Attending: EMERGENCY MEDICINE
Payer: MEDICAID

## 2019-09-09 VITALS
SYSTOLIC BLOOD PRESSURE: 104 MMHG | DIASTOLIC BLOOD PRESSURE: 68 MMHG | OXYGEN SATURATION: 97 % | RESPIRATION RATE: 16 BRPM | HEIGHT: 56 IN | HEART RATE: 111 BPM | WEIGHT: 120 LBS | BODY MASS INDEX: 26.99 KG/M2 | TEMPERATURE: 98.1 F

## 2019-09-09 DIAGNOSIS — F10.929 ALCOHOLIC INTOXICATION WITH COMPLICATION (HCC): Primary | ICD-10-CM

## 2019-09-09 LAB
ALBUMIN SERPL-MCNC: 3.9 G/DL (ref 3.4–5)
ALBUMIN/GLOB SERPL: 0.9 {RATIO} (ref 0.8–1.7)
ALP SERPL-CCNC: 141 U/L (ref 45–117)
ALT SERPL-CCNC: 26 U/L (ref 13–56)
AMPHET UR QL SCN: NEGATIVE
ANION GAP SERPL CALC-SCNC: 7 MMOL/L (ref 3–18)
APPEARANCE UR: CLEAR
AST SERPL-CCNC: 22 U/L (ref 10–38)
BACTERIA URNS QL MICRO: ABNORMAL /HPF
BARBITURATES UR QL SCN: NEGATIVE
BASOPHILS # BLD: 0 K/UL (ref 0–0.1)
BASOPHILS NFR BLD: 0 % (ref 0–2)
BENZODIAZ UR QL: POSITIVE
BILIRUB SERPL-MCNC: 0.4 MG/DL (ref 0.2–1)
BILIRUB UR QL: NEGATIVE
BUN SERPL-MCNC: 8 MG/DL (ref 7–18)
BUN/CREAT SERPL: 9 (ref 12–20)
CALCIUM SERPL-MCNC: 8.6 MG/DL (ref 8.5–10.1)
CANNABINOIDS UR QL SCN: POSITIVE
CHLORIDE SERPL-SCNC: 105 MMOL/L (ref 100–111)
CO2 SERPL-SCNC: 30 MMOL/L (ref 21–32)
COCAINE UR QL SCN: POSITIVE
COLOR UR: YELLOW
CREAT SERPL-MCNC: 0.93 MG/DL (ref 0.6–1.3)
DIFFERENTIAL METHOD BLD: NORMAL
EOSINOPHIL # BLD: 0 K/UL (ref 0–0.4)
EOSINOPHIL NFR BLD: 0 % (ref 0–5)
EPITH CASTS URNS QL MICRO: ABNORMAL /LPF (ref 0–5)
ERYTHROCYTE [DISTWIDTH] IN BLOOD BY AUTOMATED COUNT: 13.2 % (ref 11.6–14.5)
ETHANOL SERPL-MCNC: 176 MG/DL (ref 0–3)
GLOBULIN SER CALC-MCNC: 4.2 G/DL (ref 2–4)
GLUCOSE SERPL-MCNC: 102 MG/DL (ref 74–99)
GLUCOSE UR STRIP.AUTO-MCNC: NEGATIVE MG/DL
HCG SERPL QL: NEGATIVE
HCT VFR BLD AUTO: 39.6 % (ref 35–45)
HDSCOM,HDSCOM: ABNORMAL
HGB BLD-MCNC: 13.3 G/DL (ref 12–16)
HGB UR QL STRIP: ABNORMAL
KETONES UR QL STRIP.AUTO: NEGATIVE MG/DL
LEUKOCYTE ESTERASE UR QL STRIP.AUTO: ABNORMAL
LYMPHOCYTES # BLD: 3.6 K/UL (ref 0.9–3.6)
LYMPHOCYTES NFR BLD: 30 % (ref 21–52)
MAGNESIUM SERPL-MCNC: 2.3 MG/DL (ref 1.6–2.6)
MCH RBC QN AUTO: 31.4 PG (ref 24–34)
MCHC RBC AUTO-ENTMCNC: 33.6 G/DL (ref 31–37)
MCV RBC AUTO: 93.6 FL (ref 74–97)
METHADONE UR QL: NEGATIVE
MONOCYTES # BLD: 0.6 K/UL (ref 0.05–1.2)
MONOCYTES NFR BLD: 5 % (ref 3–10)
NEUTS SEG # BLD: 7.6 K/UL (ref 1.8–8)
NEUTS SEG NFR BLD: 65 % (ref 40–73)
NITRITE UR QL STRIP.AUTO: NEGATIVE
OPIATES UR QL: NEGATIVE
PCP UR QL: NEGATIVE
PH UR STRIP: 7.5 [PH] (ref 5–8)
PLATELET # BLD AUTO: 266 K/UL (ref 135–420)
PMV BLD AUTO: 9.8 FL (ref 9.2–11.8)
POTASSIUM SERPL-SCNC: 3.6 MMOL/L (ref 3.5–5.5)
PROT SERPL-MCNC: 8.1 G/DL (ref 6.4–8.2)
PROT UR STRIP-MCNC: NEGATIVE MG/DL
RBC # BLD AUTO: 4.23 M/UL (ref 4.2–5.3)
RBC #/AREA URNS HPF: ABNORMAL /HPF (ref 0–5)
SODIUM SERPL-SCNC: 142 MMOL/L (ref 136–145)
SP GR UR REFRACTOMETRY: 1 (ref 1–1.03)
UROBILINOGEN UR QL STRIP.AUTO: 0.2 EU/DL (ref 0.2–1)
WBC # BLD AUTO: 11.8 K/UL (ref 4.6–13.2)
WBC URNS QL MICRO: ABNORMAL /HPF (ref 0–4)

## 2019-09-09 PROCEDURE — 83735 ASSAY OF MAGNESIUM: CPT

## 2019-09-09 PROCEDURE — 99284 EMERGENCY DEPT VISIT MOD MDM: CPT

## 2019-09-09 PROCEDURE — 84703 CHORIONIC GONADOTROPIN ASSAY: CPT

## 2019-09-09 PROCEDURE — 81001 URINALYSIS AUTO W/SCOPE: CPT

## 2019-09-09 PROCEDURE — 80307 DRUG TEST PRSMV CHEM ANLYZR: CPT

## 2019-09-09 PROCEDURE — 85025 COMPLETE CBC W/AUTO DIFF WBC: CPT

## 2019-09-09 PROCEDURE — 80053 COMPREHEN METABOLIC PANEL: CPT

## 2019-09-10 NOTE — ED PROVIDER NOTES
Emergency Department Treatment Report    Patient: Susie Baum Age: 30 y.o. Sex: female    YOB: 1990 Admit Date: 9/9/2019 PCP: Elva Ruano MD   MRN: [id number]  CSN: [id number]     Room: LEE/LEE Time Dictated: 9:18 PM      Chief Complaint   Chief Complaint   Patient presents with    Alcohol Problem       History of Present Illness   30 y.o. female, PMH alcoholism, polysubstance abuse, presents with her mom requesting detox from her alcohol abuse. She drank just prior to arrival.  Denies any other complaints. Review of Systems   Constitutional: No fever, chills, or weight loss  Eyes: No visual symptoms. ENT: No sore throat, runny nose or ear pain. Respiratory: No cough, dyspnea or wheezing. Cardiovascular: No chest pain, pressure, palpitations, tightness or heaviness. Gastrointestinal: No vomiting, diarrhea or abdominal pain. Genitourinary: No dysuria, frequency, or urgency. Musculoskeletal: No joint pain or swelling. Integumentary: No rashes. Neurological: No headaches, sensory or motor symptoms. Denies complaints in all other systems. Past Medical/Surgical History     Past Medical History:   Diagnosis Date    Bipolar 1 disorder, depressed ([facility] [address]     Cocaine abuse ([facility] [address]     Neurological disorder     Pancreatitis     Psychiatric disorder      Past Surgical History:   Procedure Laterality Date    HX PELVIC LAPAROSCOPY      for perforated IUD removal    UPPER GI ENDOSCOPY,BIOPSY  3/29/2017            Social History     Social History     Socioeconomic History    Marital status: SINGLE     Spouse name: Not on file    Number of children: Not on file    Years of education: Not on file    Highest education level: Not on file   Tobacco Use    Smoking status: Never Smoker    Smokeless tobacco: Never Used   Substance and Sexual Activity    Alcohol use:  Yes     Alcohol/week: 0.8 standard drinks     Types: 1 Standard drinks or equivalent per week     Comment: 5-6 shots/week  Drug use: Yes     Frequency: 7.0 times per week     Types: Marijuana    Sexual activity: Yes     Partners: Male     Birth control/protection: None       Family History   History reviewed. No pertinent family history. Home Medications     Prior to Admission Medications   Prescriptions Last Dose Informant Patient Reported? Taking?   amitriptyline (ELAVIL) 25 mg tablet   Yes No   Sig: Take 25 mg by mouth nightly. ondansetron (ZOFRAN ODT) 4 mg disintegrating tablet   No No   Sig: Take 1-2 tablets every 6-8 hours as needed for nausea and vomiting. Facility-Administered Medications: None       Allergies     Allergies   Allergen Reactions    Latex, Natural Rubber Rash    Doxycycline Hives       Physical Exam     ED Triage Vitals [09/09/19 1955]   ED Encounter Vitals Group      /68      Pulse (Heart Rate) (!) 111      Resp Rate 16      Temp 98.1 °F (36.7 °C)      Temp src       O2 Sat (%) 97 %      Weight 120 lb      Height 4' 8\"     Constitutional: Patient appears well developed and well nourished. Appearance and behavior are age and situation appropriate. HEENT: Conjunctiva clear. PERRLA. Mucous membranes moist, non-erythematous. Surface of the pharynx, palate, and tongue are pink, moist and without lesions. Neck: supple, non tender, symmetrical, no masses or JVD. Respiratory: lungs clear to auscultation, nonlabored respirations. No tachypnea or accessory muscle use. Cardiovascular: heart regular rate and rhythm without murmur rubs or gallops. Calves soft and non-tender. Distal pulses 2+ and equal bilaterally. No peripheral edema. Gastrointestinal:  Abdomen soft, nontender without complaint of pain to palpation  Musculoskeletal: Nail beds pink with prompt capillary refill  Integumentary: warm and dry without rashes or lesions  Neurologic: alert and oriented, Sensation intact, motor strength equal and symmetric. No facial asymmetry or dysarthria.     Diagnostic Studies   Lab:   Recent Results (from the past 12 hour(s))   DRUG SCREEN, URINE    Collection Time: 09/09/19  8:00 PM   Result Value Ref Range    BENZODIAZEPINES POSITIVE (A) NEG      BARBITURATES NEGATIVE  NEG      THC (TH-CANNABINOL) POSITIVE (A) NEG      OPIATES NEGATIVE  NEG      PCP(PHENCYCLIDINE) NEGATIVE  NEG      COCAINE POSITIVE (A) NEG      AMPHETAMINES NEGATIVE  NEG      METHADONE NEGATIVE  NEG      HDSCOM (NOTE)    URINALYSIS W/ RFLX MICROSCOPIC    Collection Time: 09/09/19  8:00 PM   Result Value Ref Range    Color YELLOW      Appearance CLEAR      Specific gravity 1.005 1.005 - 1.030      pH (UA) 7.5 5.0 - 8.0      Protein NEGATIVE  NEG mg/dL    Glucose NEGATIVE  NEG mg/dL    Ketone NEGATIVE  NEG mg/dL    Bilirubin NEGATIVE  NEG      Blood TRACE (A) NEG      Urobilinogen 0.2 0.2 - 1.0 EU/dL    Nitrites NEGATIVE  NEG      Leukocyte Esterase MODERATE (A) NEG     URINE MICROSCOPIC ONLY    Collection Time: 09/09/19  8:00 PM   Result Value Ref Range    WBC 2 to 5 0 - 4 /hpf    RBC 0 to 2 0 - 5 /hpf    Epithelial cells 4+ 0 - 5 /lpf    Bacteria 1+ (A) NEG /hpf   CBC WITH AUTOMATED DIFF    Collection Time: 09/09/19  9:00 PM   Result Value Ref Range    WBC 11.8 4.6 - 13.2 K/uL    RBC 4.23 4.20 - 5.30 M/uL    HGB 13.3 12.0 - 16.0 g/dL    HCT 39.6 35.0 - 45.0 %    MCV 93.6 74.0 - 97.0 FL    MCH 31.4 24.0 - 34.0 PG    MCHC 33.6 31.0 - 37.0 g/dL    RDW 13.2 11.6 - 14.5 %    PLATELET 401 302 - 569 K/uL    MPV 9.8 9.2 - 11.8 FL    NEUTROPHILS 65 40 - 73 %    LYMPHOCYTES 30 21 - 52 %    MONOCYTES 5 3 - 10 %    EOSINOPHILS 0 0 - 5 %    BASOPHILS 0 0 - 2 %    ABS. NEUTROPHILS 7.6 1.8 - 8.0 K/UL    ABS. LYMPHOCYTES 3.6 0.9 - 3.6 K/UL    ABS. MONOCYTES 0.6 0.05 - 1.2 K/UL    ABS. EOSINOPHILS 0.0 0.0 - 0.4 K/UL    ABS.  BASOPHILS 0.0 0.0 - 0.1 K/UL    DF AUTOMATED     METABOLIC PANEL, COMPREHENSIVE    Collection Time: 09/09/19  9:00 PM   Result Value Ref Range    Sodium 142 136 - 145 mmol/L    Potassium 3.6 3.5 - 5.5 mmol/L    Chloride 105 100 - 111 mmol/L    CO2 30 21 - 32 mmol/L    Anion gap 7 3.0 - 18 mmol/L    Glucose 102 (H) 74 - 99 mg/dL    BUN 8 7.0 - 18 MG/DL    Creatinine 0.93 0.6 - 1.3 MG/DL    BUN/Creatinine ratio 9 (L) 12 - 20      GFR est AA >60 >60 ml/min/1.73m2    GFR est non-AA >60 >60 ml/min/1.73m2    Calcium 8.6 8.5 - 10.1 MG/DL    Bilirubin, total 0.4 0.2 - 1.0 MG/DL    ALT (SGPT) 26 13 - 56 U/L    AST (SGOT) 22 10 - 38 U/L    Alk. phosphatase 141 (H) 45 - 117 U/L    Protein, total 8.1 6.4 - 8.2 g/dL    Albumin 3.9 3.4 - 5.0 g/dL    Globulin 4.2 (H) 2.0 - 4.0 g/dL    A-G Ratio 0.9 0.8 - 1.7     ETHYL ALCOHOL    Collection Time: 09/09/19  9:00 PM   Result Value Ref Range    ALCOHOL(ETHYL),SERUM 176 (H) 0 - 3 MG/DL   HCG QL SERUM    Collection Time: 09/09/19  9:00 PM   Result Value Ref Range    HCG, Ql. NEGATIVE  NEG     MAGNESIUM    Collection Time: 09/09/19  9:00 PM   Result Value Ref Range    Magnesium 2.3 1.6 - 2.6 mg/dL       Imaging:    No results found. Evelio    ED Course/Medical Decision Making   Patient is in no acute distress. While she was in the ER, she decided that she did not want to wait for detox. She has the information for outpatient detox and mom said that she will take her tomorrow morning. The patient is not suicidal or homicidal.  She does not meet criteria for TDO. Instructed the patient to return to the ER at any time. Medications - No data to display    Final Diagnosis       ICD-10-CM ICD-9-CM   1. [facility] W48.738 305.00       Disposition   Patient is discharged home in stable condition. Advised to follow with their primary care physician. Patient advised to return to the ER for any new or worsening symptoms. My Medications      CONTINUE taking these medications      Instructions Each Dose to Equal Morning Noon Evening Bedtime   amitriptyline 25 mg tablet  Commonly known as:  ELAVIL    Your last dose was: Your next dose is:           Take 25 mg by mouth nightly. 25 mg                 ondansetron 4 mg disintegrating tablet  Commonly known as:  ZOFRAN ODT    Your last dose was: Your next dose is: Take 1-2 tablets every 6-8 hours as needed for nausea and vomiting. Noe Carl MD  September 10, 2019    My signature above authenticates this document and my orders, the final    diagnosis (es), discharge prescription (s), and instructions in the Epic    record. If you have any questions please contact [phone]. Nursing notes have been reviewed by the physician/ advanced practice    Clinician. Disclaimer: Sections of this note are dictated using utilizing voice recognition software. Minor typographical errors may be present. If questions arise, please do not hesitate to contact me or call our department.

## 2019-09-10 NOTE — ED NOTES
Pt at nursing station requesting to leave, pt denies SI, HI, mom states she is going to take patient to rehab in the morning, pt contracts for safety at this time, Dr. Fawad Garcia made aware.

## 2019-10-16 ENCOUNTER — HOSPITAL ENCOUNTER (EMERGENCY)
Age: 29
Discharge: HOME | End: 2019-10-16
Attending: EMERGENCY MEDICINE
Payer: MEDICAID

## 2019-10-16 ENCOUNTER — APPOINTMENT (OUTPATIENT)
Dept: GENERAL RADIOLOGY | Age: 29
End: 2019-10-16
Attending: PHYSICIAN ASSISTANT
Payer: MEDICAID

## 2019-10-16 VITALS
HEIGHT: 56 IN | WEIGHT: 96 LBS | HEART RATE: 71 BPM | RESPIRATION RATE: 18 BRPM | BODY MASS INDEX: 21.59 KG/M2 | SYSTOLIC BLOOD PRESSURE: 119 MMHG | OXYGEN SATURATION: 98 % | DIASTOLIC BLOOD PRESSURE: 60 MMHG | TEMPERATURE: 98.8 F

## 2019-10-16 DIAGNOSIS — N30.01 ACUTE CYSTITIS WITH HEMATURIA: Primary | ICD-10-CM

## 2019-10-16 DIAGNOSIS — R11.2 NON-INTRACTABLE VOMITING WITH NAUSEA, UNSPECIFIED VOMITING TYPE: ICD-10-CM

## 2019-10-16 DIAGNOSIS — J06.9 VIRAL URI WITH COUGH: ICD-10-CM

## 2019-10-16 LAB
ALBUMIN SERPL-MCNC: 3.8 G/DL (ref 3.4–5)
ALBUMIN/GLOB SERPL: 0.9 {RATIO} (ref 0.8–1.7)
ALP SERPL-CCNC: 117 U/L (ref 45–117)
ALT SERPL-CCNC: 27 U/L (ref 13–56)
ANION GAP SERPL CALC-SCNC: 6 MMOL/L (ref 3–18)
APPEARANCE UR: ABNORMAL
AST SERPL-CCNC: 18 U/L (ref 10–38)
BACTERIA URNS QL MICRO: ABNORMAL /HPF
BASOPHILS # BLD: 0 K/UL (ref 0–0.1)
BASOPHILS NFR BLD: 0 % (ref 0–2)
BILIRUB SERPL-MCNC: 0.3 MG/DL (ref 0.2–1)
BILIRUB UR QL: NEGATIVE
BUN SERPL-MCNC: 13 MG/DL (ref 7–18)
BUN/CREAT SERPL: 19 (ref 12–20)
CALCIUM SERPL-MCNC: 9.1 MG/DL (ref 8.5–10.1)
CHLORIDE SERPL-SCNC: 105 MMOL/L (ref 100–111)
CO2 SERPL-SCNC: 30 MMOL/L (ref 21–32)
COLOR UR: YELLOW
CREAT SERPL-MCNC: 0.69 MG/DL (ref 0.6–1.3)
DIFFERENTIAL METHOD BLD: ABNORMAL
EOSINOPHIL # BLD: 0 K/UL (ref 0–0.4)
EOSINOPHIL NFR BLD: 0 % (ref 0–5)
EPITH CASTS URNS QL MICRO: ABNORMAL /LPF (ref 0–5)
ERYTHROCYTE [DISTWIDTH] IN BLOOD BY AUTOMATED COUNT: 14.3 % (ref 11.6–14.5)
GLOBULIN SER CALC-MCNC: 4.2 G/DL (ref 2–4)
GLUCOSE SERPL-MCNC: 82 MG/DL (ref 74–99)
GLUCOSE UR STRIP.AUTO-MCNC: NEGATIVE MG/DL
HCG UR QL: NEGATIVE
HCT VFR BLD AUTO: 38.5 % (ref 35–45)
HGB BLD-MCNC: 12.4 G/DL (ref 12–16)
HGB UR QL STRIP: NEGATIVE
KETONES UR QL STRIP.AUTO: NEGATIVE MG/DL
LEUKOCYTE ESTERASE UR QL STRIP.AUTO: ABNORMAL
LIPASE SERPL-CCNC: 127 U/L (ref 73–393)
LYMPHOCYTES # BLD: 2.8 K/UL (ref 0.9–3.6)
LYMPHOCYTES NFR BLD: 23 % (ref 21–52)
MCH RBC QN AUTO: 31 PG (ref 24–34)
MCHC RBC AUTO-ENTMCNC: 32.2 G/DL (ref 31–37)
MCV RBC AUTO: 96.3 FL (ref 74–97)
MONOCYTES # BLD: 1 K/UL (ref 0.05–1.2)
MONOCYTES NFR BLD: 8 % (ref 3–10)
MUCOUS THREADS URNS QL MICRO: ABNORMAL /LPF
NEUTS SEG # BLD: 8.5 K/UL (ref 1.8–8)
NEUTS SEG NFR BLD: 69 % (ref 40–73)
NITRITE UR QL STRIP.AUTO: NEGATIVE
PH UR STRIP: 7.5 [PH] (ref 5–8)
PLATELET # BLD AUTO: 267 K/UL (ref 135–420)
PMV BLD AUTO: 9.7 FL (ref 9.2–11.8)
POTASSIUM SERPL-SCNC: 3.4 MMOL/L (ref 3.5–5.5)
PROT SERPL-MCNC: 8 G/DL (ref 6.4–8.2)
PROT UR STRIP-MCNC: 30 MG/DL
RBC # BLD AUTO: 4 M/UL (ref 4.2–5.3)
RBC #/AREA URNS HPF: ABNORMAL /HPF (ref 0–5)
SODIUM SERPL-SCNC: 141 MMOL/L (ref 136–145)
SP GR UR REFRACTOMETRY: 1.03 (ref 1–1.03)
UROBILINOGEN UR QL STRIP.AUTO: 1 EU/DL (ref 0.2–1)
WBC # BLD AUTO: 12.4 K/UL (ref 4.6–13.2)
WBC URNS QL MICRO: ABNORMAL /HPF (ref 0–4)

## 2019-10-16 PROCEDURE — 71046 X-RAY EXAM CHEST 2 VIEWS: CPT

## 2019-10-16 PROCEDURE — 85025 COMPLETE CBC W/AUTO DIFF WBC: CPT

## 2019-10-16 PROCEDURE — 80053 COMPREHEN METABOLIC PANEL: CPT

## 2019-10-16 PROCEDURE — 81025 URINE PREGNANCY TEST: CPT

## 2019-10-16 PROCEDURE — 87081 CULTURE SCREEN ONLY: CPT

## 2019-10-16 PROCEDURE — 99283 EMERGENCY DEPT VISIT LOW MDM: CPT

## 2019-10-16 PROCEDURE — 81001 URINALYSIS AUTO W/SCOPE: CPT

## 2019-10-16 PROCEDURE — 83690 ASSAY OF LIPASE: CPT

## 2019-10-16 RX ORDER — IBUPROFEN 800 MG/1
800 TABLET ORAL
Qty: 20 TAB | Refills: 0 | Status: SHIPPED | OUTPATIENT
Start: 2019-10-16 | End: 2019-10-23

## 2019-10-16 RX ORDER — ONDANSETRON 4 MG/1
4 TABLET, ORALLY DISINTEGRATING ORAL
Qty: 15 TAB | Refills: 0 | Status: SHIPPED | OUTPATIENT
Start: 2019-10-16 | End: 2022-07-14

## 2019-10-16 RX ORDER — PSEUDOEPHEDRINE HCL 120 MG/1
120 TABLET, FILM COATED, EXTENDED RELEASE ORAL
Qty: 14 TAB | Refills: 0 | Status: SHIPPED | OUTPATIENT
Start: 2019-10-16 | End: 2022-07-14

## 2019-10-16 RX ORDER — SULFAMETHOXAZOLE AND TRIMETHOPRIM 800; 160 MG/1; MG/1
1 TABLET ORAL 2 TIMES DAILY
Qty: 20 TAB | Refills: 0 | Status: SHIPPED | OUTPATIENT
Start: 2019-10-16 | End: 2019-10-26

## 2019-10-16 RX ORDER — BENZONATATE 100 MG/1
100 CAPSULE ORAL
Qty: 30 CAP | Refills: 0 | Status: SHIPPED | OUTPATIENT
Start: 2019-10-16 | End: 2019-10-26

## 2019-10-16 NOTE — DISCHARGE INSTRUCTIONS
Patient Education        Nausea and Vomiting: Care Instructions  Your Care Instructions    When you are nauseated, you may feel weak and sweaty and notice a lot of saliva in your mouth. Nausea often leads to vomiting. Most of the time you do not need to worry about nausea and vomiting, but they can be signs of other illnesses. Two common causes of nausea and vomiting are stomach flu and food poisoning. Nausea and vomiting from viral stomach flu will usually start to improve within 24 hours. Nausea and vomiting from food poisoning may last from 12 to 48 hours. The doctor has checked you carefully, but problems can develop later. If you notice any problems or new symptoms, get medical treatment right away. Follow-up care is a key part of your treatment and safety. Be sure to make and go to all appointments, and call your doctor if you are having problems. It's also a good idea to know your test results and keep a list of the medicines you take. How can you care for yourself at home? · To prevent dehydration, drink plenty of fluids, enough so that your urine is light yellow or clear like water. Choose water and other caffeine-free clear liquids until you feel better. If you have kidney, heart, or liver disease and have to limit fluids, talk with your doctor before you increase the amount of fluids you drink. · Rest in bed until you feel better. · When you are able to eat, try clear soups, mild foods, and liquids until all symptoms are gone for 12 to 48 hours. Other good choices include dry toast, crackers, cooked cereal, and gelatin dessert, such as Jell-O. When should you call for help? Call 911 anytime you think you may need emergency care. For example, call if:    · You passed out (lost consciousness).    Call your doctor now or seek immediate medical care if:    · You have symptoms of dehydration, such as:  ? Dry eyes and a dry mouth. ? Passing only a little dark urine. ?  Feeling thirstier than usual.   · You have new or worsening belly pain.     · You have a new or higher fever.     · You vomit blood or what looks like coffee grounds.    Watch closely for changes in your health, and be sure to contact your doctor if:    · You have ongoing nausea and vomiting.     · Your vomiting is getting worse.     · Your vomiting lasts longer than 2 days.     · You are not getting better as expected. Where can you learn more? Go to http://gerardo-stan.info/. Enter [phone] [id number] in the search box to learn more about \"Nausea and Vomiting: Care Instructions. \"  Current as of: 2019  Content Version: 12.2  © 6515-4488 [employer]. Care instructions adapted under license by [employer] (which disclaims liability or warranty for this information). If you have questions about a medical condition or this instruction, always ask your healthcare professional. [address] any warranty or liability for your use of this information. [employer] Activation    Thank you for requesting access to [employer]. Please follow the instructions below to securely access and download your online medical record. [employer] allows you to send messages to your doctor, view your test results, renew your prescriptions, schedule appointments, and more. How Do I Sign Up? 1. In your internet browser, go to [email or web address]  2. Click on the First Time User? Click Here link in the Sign In box. You will be redirect to the New Member Sign Up page. 3. Enter your [employer] Access Code exactly as it appears below. You will not need to use this code after youve completed the sign-up process. If you do not sign up before the expiration date, you must request a new code. [employer] Access Code: [email or web address]  Expires: 10/24/2019  7:45 PM (This is the date your [employer] access code will )    4.  Enter the last four digits of your Social Security Number (xxxx) and Date of Birth (mm/dd/yyyy) as indicated and click Submit. You will be taken to the next sign-up page. 5. Create a Chat[employer] ID. This will be your [employer] login ID and cannot be changed, so think of one that is secure and easy to remember. 6. Create a [employer] password. You can change your password at any time. 7. Enter your Password Reset Question and Answer. This can be used at a later time if you forget your password. 8. Enter your e-mail address. You will receive e-mail notification when new information is available in [address] 9. Click Sign Up. You can now view and download portions of your medical record. 10. Click the Download Summary menu link to download a portable copy of your medical information. Additional Information    If you have questions, please visit the Frequently Asked Questions section of the [employer] website at [email or web address] Remember, [employer] is NOT to be used for urgent needs. For medical emergencies, dial 911. Complete all medications as prescribed. Follow-up with primary care doctor in 1 week. Return to the ED immediately for any new or worsening symptoms.

## 2019-10-16 NOTE — ED NOTES
Patient armband removed and shreddedI have reviewed discharge instructions with the patient. The patient verbalized understanding.  rx x 5 given;

## 2019-10-16 NOTE — ED PROVIDER NOTES
EMERGENCY DEPARTMENT HISTORY AND PHYSICAL EXAM    Date: 10/16/2019  Patient Name: Trevor Oneil    History of Presenting Illness     Chief Complaint   Patient presents with    Abdominal Pain    Sore Throat    Ear Pain         History Provided By:patient     Chief Complaint: N/V and URI sx   Duration: 2 weeks  Timing: acute  Location: abdomen and upper respiratory   Quality: aching  Severity:moderate  Modifying Factors: OTC meds have not helped   Associated Symptoms: cough, abd pain, N/V, congestion, sore throat, ear pain      Additional History (Context): Trevor Oneil is a 30 y.o. female with PMH bipolar disorder, cocaine abuse, and pancreatitis who presents with c/o 2 weeks of productive cough, congestion, sore throat, ear pain, chills, N/V, and abd aching. Pt states OTC meds have not alleviated the sx. No other complaints reported at this time. PCP: Ariel Daigle MD    Current Outpatient Medications   Medication Sig Dispense Refill    benzonatate (TESSALON PERLES) 100 mg capsule Take 1 Cap by mouth three (3) times daily as needed for Cough for up to 10 days. 30 Cap 0    pseudoephedrine CR (SUDAFED 12 HOUR) 120 mg CR tablet Take 1 Tab by mouth two (2) times daily as needed for Congestion. 14 Tab 0    trimethoprim-sulfamethoxazole (BACTRIM DS) 160-800 mg per tablet Take 1 Tab by mouth two (2) times a day for 10 days. 20 Tab 0    ibuprofen (MOTRIN) 800 mg tablet Take 1 Tab by mouth every six (6) hours as needed for Pain for up to 7 days. 20 Tab 0    ondansetron (ZOFRAN ODT) 4 mg disintegrating tablet Take 1 Tab by mouth every eight (8) hours as needed for Nausea. 15 Tab 0    amitriptyline (ELAVIL) 25 mg tablet Take 25 mg by mouth nightly.          Past History     Past Medical History:  Past Medical History:   Diagnosis Date    Bipolar 1 disorder, depressed ([facility] [address]     Cocaine abuse ([facility] [address]     Neurological disorder     Pancreatitis     Psychiatric disorder        Past Surgical History:  Past Surgical History:   Procedure Laterality Date    HX PELVIC LAPAROSCOPY      for perforated IUD removal    UPPER GI ENDOSCOPY,BIOPSY  3/29/2017            Family History:  History reviewed. No pertinent family history. Social History:  Social History     Tobacco Use    Smoking status: Never Smoker    Smokeless tobacco: Never Used   Substance Use Topics    Alcohol use: Yes     Alcohol/week: 0.8 standard drinks     Types: 1 Standard drinks or equivalent per week     Comment: 5-6 shots/week    Drug use: Yes     Frequency: 7.0 times per week     Types: Marijuana       Allergies: Allergies   Allergen Reactions    Latex, Natural Rubber Rash    Doxycycline Hives         Review of Systems   Review of Systems   Constitutional: Negative. Negative for chills and fever. HENT: Positive for congestion, ear pain and sore throat. Negative for rhinorrhea. Eyes: Negative. Negative for pain and redness. Respiratory: Positive for cough. Negative for shortness of breath, wheezing and stridor. Cardiovascular: Negative. Negative for chest pain and leg swelling. Gastrointestinal: Positive for abdominal pain, nausea and vomiting. Negative for constipation and diarrhea. Genitourinary: Negative. Negative for dysuria and frequency. Musculoskeletal: Negative. Negative for back pain and neck pain. Skin: Negative. Negative for rash and wound. Neurological: Negative. Negative for dizziness, seizures, syncope and headaches. All other systems reviewed and are negative. All Other Systems Negative  Physical Exam     Vitals:    10/16/19 1511   BP: 119/60   Pulse: 71   Resp: 18   Temp: 98.8 °F (37.1 °C)   SpO2: 98%   Weight: 43.5 kg (96 lb)   Height: 4' 8\" (1.422 m)     Physical Exam   Constitutional: She is oriented to person, place, and time. She appears well-developed and well-nourished. No distress. HENT:   Head: Normocephalic and atraumatic.    Right Ear: External ear normal.   Left Ear: External ear normal.   Mouth/Throat: No oropharyngeal exudate. Mild bilateral nasal congestion noted, oropharynx slightly erythematous without exudates or tonsillar enlargement noted. Eyes: Conjunctivae are normal. Right eye exhibits no discharge. Left eye exhibits no discharge. No scleral icterus. Neck: Normal range of motion. Neck supple. Cardiovascular: Normal rate, regular rhythm and normal heart sounds. Exam reveals no gallop and no friction rub. No murmur heard. Pulmonary/Chest: Effort normal and breath sounds normal. No stridor. No respiratory distress. She has no wheezes. She has no rales. Abdominal: Soft. Bowel sounds are normal. She exhibits no distension and no mass. There is tenderness. There is no rebound and no guarding. Diffuse mild abd TTP noted throughout the abdomen, no guarding or peritoneal signs noted. No rebound TTP noted. Musculoskeletal: Normal range of motion. Neurological: She is alert and oriented to person, place, and time. Coordination normal.   Gait is steady. Able to ambulate without difficulty. Skin: Skin is warm and dry. No rash noted. She is not diaphoretic. No erythema. Psychiatric: She has a normal mood and affect. Her behavior is normal. Thought content normal.   Nursing note and vitals reviewed.              Diagnostic Study Results     Labs -     Recent Results (from the past 12 hour(s))   STREP THROAT SCREEN    Collection Time: 10/16/19  3:15 PM   Result Value Ref Range    Special Requests: NO SPECIAL REQUESTS      Strep Screen NEGATIVE       Culture result: PENDING    URINALYSIS W/ RFLX MICROSCOPIC    Collection Time: 10/16/19  3:15 PM   Result Value Ref Range    Color YELLOW      Appearance CLOUDY      Specific gravity 1.027 1.005 - 1.030      pH (UA) 7.5 5.0 - 8.0      Protein 30 (A) NEG mg/dL    Glucose NEGATIVE  NEG mg/dL    Ketone NEGATIVE  NEG mg/dL    Bilirubin NEGATIVE  NEG      Blood NEGATIVE  NEG      Urobilinogen 1.0 0.2 - 1.0 EU/dL    Nitrites NEGATIVE  NEG      Leukocyte Esterase MODERATE (A) NEG     HCG URINE, QL    Collection Time: 10/16/19  3:15 PM   Result Value Ref Range    HCG urine, QL NEGATIVE  NEG     URINE MICROSCOPIC ONLY    Collection Time: 10/16/19  3:15 PM   Result Value Ref Range    WBC 11 to 20 0 - 4 /hpf    RBC 0 to 3 0 - 5 /hpf    Epithelial cells 4+ 0 - 5 /lpf    Bacteria 3+ (A) NEG /hpf    Mucus 2+ (A) NEG /lpf   CBC WITH AUTOMATED DIFF    Collection Time: 10/16/19  4:15 PM   Result Value Ref Range    WBC 12.4 4.6 - 13.2 K/uL    RBC 4.00 (L) 4.20 - 5.30 M/uL    HGB 12.4 12.0 - 16.0 g/dL    HCT 38.5 35.0 - 45.0 %    MCV 96.3 74.0 - 97.0 FL    MCH 31.0 24.0 - 34.0 PG    MCHC 32.2 31.0 - 37.0 g/dL    RDW 14.3 11.6 - 14.5 %    PLATELET 123 999 - 467 K/uL    MPV 9.7 9.2 - 11.8 FL    NEUTROPHILS 69 40 - 73 %    LYMPHOCYTES 23 21 - 52 %    MONOCYTES 8 3 - 10 %    EOSINOPHILS 0 0 - 5 %    BASOPHILS 0 0 - 2 %    ABS. NEUTROPHILS 8.5 (H) 1.8 - 8.0 K/UL    ABS. LYMPHOCYTES 2.8 0.9 - 3.6 K/UL    ABS. MONOCYTES 1.0 0.05 - 1.2 K/UL    ABS. EOSINOPHILS 0.0 0.0 - 0.4 K/UL    ABS. BASOPHILS 0.0 0.0 - 0.1 K/UL    DF AUTOMATED     METABOLIC PANEL, COMPREHENSIVE    Collection Time: 10/16/19  4:15 PM   Result Value Ref Range    Sodium 141 136 - 145 mmol/L    Potassium 3.4 (L) 3.5 - 5.5 mmol/L    Chloride 105 100 - 111 mmol/L    CO2 30 21 - 32 mmol/L    Anion gap 6 3.0 - 18 mmol/L    Glucose 82 74 - 99 mg/dL    BUN 13 7.0 - 18 MG/DL    Creatinine 0.69 0.6 - 1.3 MG/DL    BUN/Creatinine ratio 19 12 - 20      GFR est AA >60 >60 ml/min/1.73m2    GFR est non-AA >60 >60 ml/min/1.73m2    Calcium 9.1 8.5 - 10.1 MG/DL    Bilirubin, total 0.3 0.2 - 1.0 MG/DL    ALT (SGPT) 27 13 - 56 U/L    AST (SGOT) 18 10 - 38 U/L    Alk.  phosphatase 117 45 - 117 U/L    Protein, total 8.0 6.4 - 8.2 g/dL    Albumin 3.8 3.4 - 5.0 g/dL    Globulin 4.2 (H) 2.0 - 4.0 g/dL    A-G Ratio 0.9 0.8 - 1.7     LIPASE    Collection Time: 10/16/19  4:15 PM   Result Value Ref Range    Lipase 127 73 - 393 U/L       Radiologic Studies -   XR CHEST PA LAT   Final Result   Impression:      Normal study. CT Results  (Last 48 hours)    None        CXR Results  (Last 48 hours)               10/16/19 1526  XR CHEST PA LAT Final result    Impression:  Impression:       Normal study. Narrative:  CHEST PA AND LATERAL:       CPT CODE: [id number]       COMPARISON: 5/1/2018       INDICATION: Chest pain       FINDINGS: Heart and mediastinum are normal. Lungs are clear with no infiltrates. No pleural effusions are seen. No osseous abnormalities. Medical Decision Making   I am the first provider for this patient. I reviewed the vital signs, available nursing notes, past medical history, past surgical history, family history and social history. Vital Signs-Reviewed the patient's vital signs. Records Reviewed: Elvira Cook PA-C     Procedures:  Procedures    Provider Notes (Medical Decision Making): Impression:  Acute viral URI with cough, N/V, UTI    Labs unremarkable, chest x-ray negative for acute process, UA shows UTI, hcg negative. Will plan to d/c with bactrim, motrin, sudafed, and tessalon with pcp follow-up. Pt agrees with this plan. Elvira Cook PA-C     MED RECONCILIATION:  No current facility-administered medications for this encounter. Current Outpatient Medications   Medication Sig    benzonatate (TESSALON PERLES) 100 mg capsule Take 1 Cap by mouth three (3) times daily as needed for Cough for up to 10 days.  pseudoephedrine CR (SUDAFED 12 HOUR) 120 mg CR tablet Take 1 Tab by mouth two (2) times daily as needed for Congestion.  trimethoprim-sulfamethoxazole (BACTRIM DS) 160-800 mg per tablet Take 1 Tab by mouth two (2) times a day for 10 days.  ibuprofen (MOTRIN) 800 mg tablet Take 1 Tab by mouth every six (6) hours as needed for Pain for up to 7 days.     ondansetron (ZOFRAN ODT) 4 mg disintegrating tablet Take 1 Tab by mouth every eight (8) hours as needed for Nausea.  amitriptyline (ELAVIL) 25 mg tablet Take 25 mg by mouth nightly. Disposition:  D/c    DISCHARGE NOTE:   Patient is stable for discharge at this time. I have discussed all the findings from today's work up with the patient, including lab results and imaging. I have answered all questions. Rx for bactrim, tessalon, motrin, and sudafed given. Rest and close follow-up with the PCP recommended this week. Return to the ED immediately for any new or worsening symptoms. Elvira Cook PA-C     Follow-up Information     Follow up With Specialties Details Why Contact Info    Dax Ramires MD [facility] Schedule an appointment as soon as possible for a visit in 1 week  [address] [facility] [id number] [facility] EMERGENCY DEPT Emergency Medicine  As needed, If symptoms worsen [address] [facility]  [phone]          Current Discharge Medication List      START taking these medications    Details   benzonatate (TESSALON PERLES) 100 mg capsule Take 1 Cap by mouth three (3) times daily as needed for Cough for up to 10 days. Qty: 30 Cap, Refills: 0      pseudoephedrine CR (SUDAFED 12 HOUR) 120 mg CR tablet Take 1 Tab by mouth two (2) times daily as needed for Congestion. Qty: 14 Tab, Refills: 0      trimethoprim-sulfamethoxazole (BACTRIM DS) 160-800 mg per tablet Take 1 Tab by mouth two (2) times a day for 10 days. Qty: 20 Tab, Refills: 0      ibuprofen (MOTRIN) 800 mg tablet Take 1 Tab by mouth every six (6) hours as needed for Pain for up to 7 days. Qty: 20 Tab, Refills: 0         CONTINUE these medications which have CHANGED    Details   ondansetron (ZOFRAN ODT) 4 mg disintegrating tablet Take 1 Tab by mouth every eight (8) hours as needed for Nausea. Qty: 15 Tab, Refills: 0               Diagnosis     Clinical Impression:   1. Acute cystitis with hematuria    2.  Non-intractable vomiting with nausea, unspecified vomiting type    3.  Viral URI with cough

## 2019-10-18 LAB
B-HEM STREP THROAT QL CULT: NEGATIVE
BACTERIA SPEC CULT: NORMAL
SERVICE CMNT-IMP: NORMAL

## 2020-10-08 ENCOUNTER — APPOINTMENT (OUTPATIENT)
Dept: CT IMAGING | Age: 30
End: 2020-10-08
Attending: PHYSICIAN ASSISTANT
Payer: MEDICAID

## 2020-10-08 ENCOUNTER — HOSPITAL ENCOUNTER (EMERGENCY)
Age: 30
Discharge: HOME | End: 2020-10-09
Attending: EMERGENCY MEDICINE
Payer: MEDICAID

## 2020-10-08 ENCOUNTER — APPOINTMENT (OUTPATIENT)
Dept: GENERAL RADIOLOGY | Age: 30
End: 2020-10-08
Attending: EMERGENCY MEDICINE
Payer: MEDICAID

## 2020-10-08 DIAGNOSIS — N30.01 ACUTE CYSTITIS WITH HEMATURIA: Primary | ICD-10-CM

## 2020-10-08 DIAGNOSIS — R05.9 COUGH: ICD-10-CM

## 2020-10-08 LAB
AMORPH CRY URNS QL MICRO: ABNORMAL
APPEARANCE UR: ABNORMAL
BACTERIA URNS QL MICRO: ABNORMAL /HPF
BILIRUB UR QL: NEGATIVE
COLOR UR: YELLOW
EPITH CASTS URNS QL MICRO: ABNORMAL /LPF (ref 0–5)
GLUCOSE UR STRIP.AUTO-MCNC: NEGATIVE MG/DL
HCG UR QL: NEGATIVE
HGB UR QL STRIP: ABNORMAL
KETONES UR QL STRIP.AUTO: ABNORMAL MG/DL
LEUKOCYTE ESTERASE UR QL STRIP.AUTO: ABNORMAL
NITRITE UR QL STRIP.AUTO: NEGATIVE
PH UR STRIP: 7.5 [PH] (ref 5–8)
PROT UR STRIP-MCNC: NEGATIVE MG/DL
RBC #/AREA URNS HPF: ABNORMAL /HPF (ref 0–5)
SP GR UR REFRACTOMETRY: 1.02 (ref 1–1.03)
UROBILINOGEN UR QL STRIP.AUTO: 1 EU/DL (ref 0.2–1)
WBC URNS QL MICRO: ABNORMAL /HPF (ref 0–4)

## 2020-10-08 PROCEDURE — 87086 URINE CULTURE/COLONY COUNT: CPT

## 2020-10-08 PROCEDURE — 81025 URINE PREGNANCY TEST: CPT

## 2020-10-08 PROCEDURE — 71045 X-RAY EXAM CHEST 1 VIEW: CPT

## 2020-10-08 PROCEDURE — 96375 TX/PRO/DX INJ NEW DRUG ADDON: CPT

## 2020-10-08 PROCEDURE — 81001 URINALYSIS AUTO W/SCOPE: CPT

## 2020-10-08 PROCEDURE — 96374 THER/PROPH/DIAG INJ IV PUSH: CPT

## 2020-10-08 PROCEDURE — 99284 EMERGENCY DEPT VISIT MOD MDM: CPT

## 2020-10-08 PROCEDURE — 74176 CT ABD & PELVIS W/O CONTRAST: CPT

## 2020-10-08 PROCEDURE — 87077 CULTURE AEROBIC IDENTIFY: CPT

## 2020-10-08 PROCEDURE — 96361 HYDRATE IV INFUSION ADD-ON: CPT

## 2020-10-08 PROCEDURE — 87186 SC STD MICRODIL/AGAR DIL: CPT

## 2020-10-08 RX ORDER — KETOROLAC TROMETHAMINE 15 MG/ML
15 INJECTION, SOLUTION INTRAMUSCULAR; INTRAVENOUS
Status: COMPLETED | OUTPATIENT
Start: 2020-10-08 | End: 2020-10-09

## 2020-10-09 VITALS
SYSTOLIC BLOOD PRESSURE: 131 MMHG | RESPIRATION RATE: 16 BRPM | BODY MASS INDEX: 21.97 KG/M2 | WEIGHT: 98 LBS | DIASTOLIC BLOOD PRESSURE: 82 MMHG | HEART RATE: 100 BPM | TEMPERATURE: 97.9 F | OXYGEN SATURATION: 99 %

## 2020-10-09 LAB
ALBUMIN SERPL-MCNC: 4 G/DL (ref 3.4–5)
ALBUMIN/GLOB SERPL: 1 {RATIO} (ref 0.8–1.7)
ALP SERPL-CCNC: 125 U/L (ref 45–117)
ALT SERPL-CCNC: 25 U/L (ref 13–56)
ANION GAP SERPL CALC-SCNC: 4 MMOL/L (ref 3–18)
AST SERPL-CCNC: 24 U/L (ref 10–38)
BASOPHILS # BLD: 0 K/UL (ref 0–0.1)
BASOPHILS NFR BLD: 0 % (ref 0–2)
BILIRUB SERPL-MCNC: 0.3 MG/DL (ref 0.2–1)
BUN SERPL-MCNC: 20 MG/DL (ref 7–18)
BUN/CREAT SERPL: 19 (ref 12–20)
CALCIUM SERPL-MCNC: 9.2 MG/DL (ref 8.5–10.1)
CHLORIDE SERPL-SCNC: 104 MMOL/L (ref 100–111)
CO2 SERPL-SCNC: 32 MMOL/L (ref 21–32)
CREAT SERPL-MCNC: 1.07 MG/DL (ref 0.6–1.3)
DIFFERENTIAL METHOD BLD: ABNORMAL
EOSINOPHIL # BLD: 0 K/UL (ref 0–0.4)
EOSINOPHIL NFR BLD: 0 % (ref 0–5)
ERYTHROCYTE [DISTWIDTH] IN BLOOD BY AUTOMATED COUNT: 12.8 % (ref 11.6–14.5)
GLOBULIN SER CALC-MCNC: 4.1 G/DL (ref 2–4)
GLUCOSE SERPL-MCNC: 100 MG/DL (ref 74–99)
HCT VFR BLD AUTO: 42.3 % (ref 35–45)
HGB BLD-MCNC: 14.3 G/DL (ref 12–16)
LYMPHOCYTES # BLD: 3.3 K/UL (ref 0.9–3.6)
LYMPHOCYTES NFR BLD: 31 % (ref 21–52)
MCH RBC QN AUTO: 33.6 PG (ref 24–34)
MCHC RBC AUTO-ENTMCNC: 33.8 G/DL (ref 31–37)
MCV RBC AUTO: 99.3 FL (ref 74–97)
MONOCYTES # BLD: 1.2 K/UL (ref 0.05–1.2)
MONOCYTES NFR BLD: 11 % (ref 3–10)
NEUTS SEG # BLD: 6.1 K/UL (ref 1.8–8)
NEUTS SEG NFR BLD: 58 % (ref 40–73)
PLATELET # BLD AUTO: 257 K/UL (ref 135–420)
PMV BLD AUTO: 10.1 FL (ref 9.2–11.8)
POTASSIUM SERPL-SCNC: 3.6 MMOL/L (ref 3.5–5.5)
PROT SERPL-MCNC: 8.1 G/DL (ref 6.4–8.2)
RBC # BLD AUTO: 4.26 M/UL (ref 4.2–5.3)
SODIUM SERPL-SCNC: 140 MMOL/L (ref 136–145)
WBC # BLD AUTO: 10.7 K/UL (ref 4.6–13.2)

## 2020-10-09 PROCEDURE — 74011250636 HC RX REV CODE- 250/636: Performed by: PHYSICIAN ASSISTANT

## 2020-10-09 PROCEDURE — 85025 COMPLETE CBC W/AUTO DIFF WBC: CPT

## 2020-10-09 PROCEDURE — 80053 COMPREHEN METABOLIC PANEL: CPT

## 2020-10-09 PROCEDURE — 74011000250 HC RX REV CODE- 250: Performed by: PHYSICIAN ASSISTANT

## 2020-10-09 RX ORDER — BENZONATATE 100 MG/1
100 CAPSULE ORAL
Qty: 30 CAP | Refills: 0 | Status: SHIPPED | OUTPATIENT
Start: 2020-10-09 | End: 2020-10-16

## 2020-10-09 RX ORDER — IBUPROFEN 600 MG/1
600 TABLET ORAL
Qty: 20 TAB | Refills: 0 | Status: SHIPPED | OUTPATIENT
Start: 2020-10-09 | End: 2022-07-14

## 2020-10-09 RX ORDER — CEPHALEXIN 500 MG/1
500 CAPSULE ORAL 4 TIMES DAILY
Qty: 28 CAP | Refills: 0 | Status: SHIPPED | OUTPATIENT
Start: 2020-10-09 | End: 2020-10-16

## 2020-10-09 RX ADMIN — SODIUM CHLORIDE 1000 ML: 900 INJECTION, SOLUTION INTRAVENOUS at 00:17

## 2020-10-09 RX ADMIN — CEFTRIAXONE SODIUM 1 G: 1 INJECTION, POWDER, FOR SOLUTION INTRAMUSCULAR; INTRAVENOUS at 00:30

## 2020-10-09 RX ADMIN — KETOROLAC TROMETHAMINE 15 MG: 15 INJECTION, SOLUTION INTRAMUSCULAR; INTRAVENOUS at 00:30

## 2020-10-09 NOTE — ED PROVIDER NOTES
EMERGENCY DEPARTMENT HISTORY AND PHYSICAL EXAM    Date: 10/8/2020  Patient Name: Dayan Diaz    History of Presenting Illness     Chief Complaint   Patient presents with    Cough    Back Pain         History Provided By: patient    Chief Complaint: back pain and cough   Duration: 1-2 weeks   Timing:  acute  Location: R flank and low back   Quality: sharp  Severity: moderate  Modifying Factors: none   Associated Symptoms: right sided abd pain, hematuria, cough       Additional History (Context): Dayan Diaz is a 35 y.o. female with PMH pancreatitis, bipolar disorder, and cocaine abuse who presents with c/o a dry cough x 2 weeks as well as 1 week of intermittent R sided flank/low back pain radiating around the R side of the abdomen. Pt also reports some hematuria. She states she has had kidney stones in the past, but reports her pain was worse at that time. Denies fever, chills, and any known sick exposures. No other complaints reported at this time. PCP: Sherita Dela Cruz MD    Current Facility-Administered Medications   Medication Dose Route Frequency Provider Last Rate Last Dose    sodium chloride 0.9 % bolus infusion 1,000 mL  1,000 mL IntraVENous ONCE Elvira Cook PA-C 1,000 mL/hr at 10/09/20 0017 1,000 mL at 10/09/20 0017     Current Outpatient Medications   Medication Sig Dispense Refill    cephALEXin (Keflex) 500 mg capsule Take 1 Cap by mouth four (4) times daily for 7 days. 28 Cap 0    ibuprofen (MOTRIN) 600 mg tablet Take 1 Tab by mouth every six (6) hours as needed for Pain. 20 Tab 0    benzonatate (Tessalon Perles) 100 mg capsule Take 1 Cap by mouth three (3) times daily as needed for Cough for up to 7 days. 30 Cap 0    pseudoephedrine CR (SUDAFED 12 HOUR) 120 mg CR tablet Take 1 Tab by mouth two (2) times daily as needed for Congestion. 14 Tab 0    ondansetron (ZOFRAN ODT) 4 mg disintegrating tablet Take 1 Tab by mouth every eight (8) hours as needed for Nausea.  15 Tab 0    amitriptyline (ELAVIL) 25 mg tablet Take 25 mg by mouth nightly. Past History     Past Medical History:  Past Medical History:   Diagnosis Date    Bipolar 1 disorder, depressed ([facility]     Cocaine abuse ([facility]     Neurological disorder     Pancreatitis     Psychiatric disorder        Past Surgical History:  Past Surgical History:   Procedure Laterality Date    HX PELVIC LAPAROSCOPY      for perforated IUD removal    UPPER GI ENDOSCOPY,BIOPSY  3/29/2017            Family History:  History reviewed. No pertinent family history. Social History:  Social History     Tobacco Use    Smoking status: Never Smoker    Smokeless tobacco: Never Used   Substance Use Topics    Alcohol use: Yes     Alcohol/week: 0.8 standard drinks     Types: 1 Standard drinks or equivalent per week     Comment: 5-6 shots/week    Drug use: Yes     Frequency: 7.0 times per week     Types: Marijuana, Cocaine       Allergies: Allergies   Allergen Reactions    Latex, Natural Rubber Rash    Doxycycline Hives         Review of Systems   Review of Systems   Constitutional: Negative. Negative for chills and fever. HENT: Negative. Negative for congestion, ear pain and rhinorrhea. Eyes: Negative. Negative for pain and redness. Respiratory: Positive for cough. Negative for shortness of breath, wheezing and stridor. Cardiovascular: Negative. Negative for chest pain and leg swelling. Gastrointestinal: Positive for abdominal pain. Negative for constipation, diarrhea, nausea and vomiting. Genitourinary: Positive for flank pain. Negative for dysuria and frequency. Musculoskeletal: Positive for back pain. Negative for neck pain. Skin: Negative. Negative for rash and wound. Neurological: Negative. Negative for dizziness, seizures, syncope and headaches. All other systems reviewed and are negative.     All Other Systems Negative  Physical Exam     Vitals:    10/08/20 2210 10/09/20 0000 10/09/20 0015 10/09/20 0030   BP: (!) 127/90 111/85 127/74 131/82   Pulse: 100      Resp: 16      Temp: 97.9 °F (36.6 °C)      SpO2: 96% 100% 99%    Weight: 44.5 kg (98 lb)        Physical Exam  Vitals signs and nursing note reviewed. Constitutional:       General: She is in acute distress. Appearance: She is well-developed. She is not diaphoretic. Comments: Mildly distressed   HENT:      Head: Normocephalic and atraumatic. Eyes:      General: No scleral icterus. Right eye: No discharge. Left eye: No discharge. Conjunctiva/sclera: Conjunctivae normal.   Neck:      Musculoskeletal: Normal range of motion and neck supple. Cardiovascular:      Rate and Rhythm: Normal rate and regular rhythm. Heart sounds: Normal heart sounds. No murmur. No friction rub. No gallop. Pulmonary:      Effort: Pulmonary effort is normal. No respiratory distress. Breath sounds: Normal breath sounds. No stridor. No wheezing, rhonchi or rales. Abdominal:      General: Bowel sounds are normal. There is no distension. Palpations: Abdomen is soft. Tenderness: There is abdominal tenderness. There is right CVA tenderness. There is no left CVA tenderness or guarding. Comments: Mild diffuse R sided abd pain, R CVA TTP as well. No guarding or peritoneal signs noted on exam..    Musculoskeletal: Normal range of motion. Skin:     General: Skin is warm and dry. Findings: No erythema or rash. Neurological:      Mental Status: She is alert and oriented to person, place, and time. Coordination: Coordination normal.      Comments: Gait is steady and patient exhibits no evidence of ataxia. Patient is able to ambulate without difficulty. No focal neurological deficit noted. No facial droop, slurred speech, or evidence of altered mentation noted on exam.     Psychiatric:         Behavior: Behavior normal.         Thought Content:  Thought content normal.                Diagnostic Study Results     Labs -     Recent Results (from the past 12 hour(s))   HCG URINE, QL    Collection Time: 10/08/20 10:15 PM   Result Value Ref Range    HCG urine, QL Negative NEG     URINALYSIS W/ RFLX MICROSCOPIC    Collection Time: 10/08/20 10:15 PM   Result Value Ref Range    Color YELLOW      Appearance TURBID      Specific gravity 1.023 1.005 - 1.030      pH (UA) 7.5 5.0 - 8.0      Protein Negative NEG mg/dL    Glucose Negative NEG mg/dL    Ketone TRACE (A) NEG mg/dL    Bilirubin Negative NEG      Blood MODERATE (A) NEG      Urobilinogen 1.0 0.2 - 1.0 EU/dL    Nitrites Negative NEG      Leukocyte Esterase TRACE (A) NEG     URINE MICROSCOPIC ONLY    Collection Time: 10/08/20 10:15 PM   Result Value Ref Range    WBC  0 - 4 /hpf     UNABLE TO QUANTITATE MICROSCOPIC ELEMENTS DUE TO EXCESSIVE AMORPHOUS    RBC  0 - 5 /hpf     UNABLE TO QUANTITATE MICROSCOPIC ELEMENTS DUE TO EXCESSIVE AMORPHOUS    Epithelial cells 1+ 0 - 5 /lpf    Bacteria (A) NEG /hpf     UNABLE TO QUANTITATE MICROSCOPIC ELEMENTS DUE TO EXCESSIVE AMORPHOUS    Amorphous Crystals 4+ (A) NEG   CBC WITH AUTOMATED DIFF    Collection Time: 10/09/20 12:02 AM   Result Value Ref Range    WBC 10.7 4.6 - 13.2 K/uL    RBC 4.26 4.20 - 5.30 M/uL    HGB 14.3 12.0 - 16.0 g/dL    HCT 42.3 35.0 - 45.0 %    MCV 99.3 (H) 74.0 - 97.0 FL    MCH 33.6 24.0 - 34.0 PG    MCHC 33.8 31.0 - 37.0 g/dL    RDW 12.8 11.6 - 14.5 %    PLATELET 728 033 - 691 K/uL    MPV 10.1 9.2 - 11.8 FL    NEUTROPHILS 58 40 - 73 %    LYMPHOCYTES 31 21 - 52 %    MONOCYTES 11 (H) 3 - 10 %    EOSINOPHILS 0 0 - 5 %    BASOPHILS 0 0 - 2 %    ABS. NEUTROPHILS 6.1 1.8 - 8.0 K/UL    ABS. LYMPHOCYTES 3.3 0.9 - 3.6 K/UL    ABS. MONOCYTES 1.2 0.05 - 1.2 K/UL    ABS. EOSINOPHILS 0.0 0.0 - 0.4 K/UL    ABS.  BASOPHILS 0.0 0.0 - 0.1 K/UL    DF AUTOMATED     METABOLIC PANEL, COMPREHENSIVE    Collection Time: 10/09/20 12:02 AM   Result Value Ref Range    Sodium 140 136 - 145 mmol/L    Potassium 3.6 3.5 - 5.5 mmol/L    Chloride 104 100 - 111 mmol/L CO2 32 21 - 32 mmol/L    Anion gap 4 3.0 - 18 mmol/L    Glucose 100 (H) 74 - 99 mg/dL    BUN 20 (H) 7.0 - 18 MG/DL    Creatinine 1.07 0.6 - 1.3 MG/DL    BUN/Creatinine ratio 19 12 - 20      GFR est AA >60 >60 ml/min/1.73m2    GFR est non-AA >60 >60 ml/min/1.73m2    Calcium 9.2 8.5 - 10.1 MG/DL    Bilirubin, total 0.3 0.2 - 1.0 MG/DL    ALT (SGPT) 25 13 - 56 U/L    AST (SGOT) 24 10 - 38 U/L    Alk. phosphatase 125 (H) 45 - 117 U/L    Protein, total 8.1 6.4 - 8.2 g/dL    Albumin 4.0 3.4 - 5.0 g/dL    Globulin 4.1 (H) 2.0 - 4.0 g/dL    A-G Ratio 1.0 0.8 - 1.7         Radiologic Studies -   CT ABD PELV WO CONT   Final Result   IMPRESSION:      1. No urolithiasis or acute obstructive uropathy. 2.  Healed granulomatous disease in the liver and spleen. 3.  Unknown debris or material in the vaginal vault. XR CHEST PORT   Final Result   IMPRESSION:   1. No acute cardiopulmonary findings. .        CT Results  (Last 48 hours)               10/09/20 0018  CT ABD PELV WO CONT Final result    Impression:  IMPRESSION:       1. No urolithiasis or acute obstructive uropathy. 2.  Healed granulomatous disease in the liver and spleen. 3.  Unknown debris or material in the vaginal vault. Narrative:  CT abdomen and pelvis without contrast           HISTORY: Back pain, urinary urgency and cough. Abdominal/flank pain and   hematuria. COMPARISON: CT 5/8/2018       TECHNIQUE: 5mm contiguous axial images from the lungs bases to the anal verge. No intravenous contrast was administered. All CT scans at this facility are performed using dose optimization technique as   appropriate to a performed exam, to include automated exposure control,   adjustment of the MA and/or kV according to patient size (including appropriate   matching for site-specific examinations) or use of  iterative reconstruction   technique. ABDOMEN FINDINGS: Visualized lung bases are clear. Calcified hepatic and splenic   granulomas. Nondistended gallbladder. Pancreas, adrenal glands and kidneys are   unremarkable. No renal calculi, hydronephrosis or perinephric stranding. No   ureteral dilatation. No pneumoperitoneum or ascites. No dilated bowel. Normal   caliber aorta. PELVIC FINDINGS:  Nondistended urinary bladder with no gross abnormalities. Vaginal debris or unknown material. No free or loculated fluid collections. No   dilated bowel. Nonlocalized appendix. No acute osseous abnormality. CXR Results  (Last 48 hours)               10/08/20 2223  XR CHEST PORT Final result    Impression:  IMPRESSION:   1. No acute cardiopulmonary findings. .       Narrative:  XR CHEST PORT: 10/8/2020 10:23 PM       CLINICAL INFORMATION   cough. COMPARISON   October 16, 2019       TECHNIQUE:   Frontal portable chest radiograph       FINDINGS   No evidence of pulmonary consolidation. Cardiomediastinal silhouette is within   normal limits. Osseous structures within normal limits. Medical Decision Making   I am the first provider for this patient. I reviewed the vital signs, available nursing notes, past medical history, past surgical history, family history and social history. Vital Signs-Reviewed the patient's vital signs. Records Reviewed: Elvira Cook PA-C     Procedures:  Procedures    Provider Notes (Medical Decision Making): Impression:  UTI, flank pain, cough     IV fluids rocephin and toradol given     Chest x-ray negative for acute process  UA consistent with UTI. Sent for culture. hcg negative     Labs unremarkable  CT negative for acute intra-abdominal process    Clinical presentation suggestive of back pain, cough and UTI will plan to d/c with tessalon motrin and keflex with pcp follow-up. Pt agrees.  Elvira Cook PA-C      MED RECONCILIATION:  Current Facility-Administered Medications   Medication Dose Route Frequency    sodium chloride 0.9 % bolus infusion 1,000 mL  1,000 mL IntraVENous ONCE     Current Outpatient Medications   Medication Sig    cephALEXin (Keflex) 500 mg capsule Take 1 Cap by mouth four (4) times daily for 7 days.  ibuprofen (MOTRIN) 600 mg tablet Take 1 Tab by mouth every six (6) hours as needed for Pain.  benzonatate (Tessalon Perles) 100 mg capsule Take 1 Cap by mouth three (3) times daily as needed for Cough for up to 7 days.  pseudoephedrine CR (SUDAFED 12 HOUR) 120 mg CR tablet Take 1 Tab by mouth two (2) times daily as needed for Congestion.  ondansetron (ZOFRAN ODT) 4 mg disintegrating tablet Take 1 Tab by mouth every eight (8) hours as needed for Nausea.  amitriptyline (ELAVIL) 25 mg tablet Take 25 mg by mouth nightly. Disposition:  D/c    DISCHARGE NOTE:   Patient is stable for discharge at this time. I have discussed all the findings from today's work up with the patient, including lab results and imaging. I have answered all questions. Rx for tessalon motrin and keflex given. Rest and close follow-up with the PCP recommended this week. Return to the ED immediately for any new or worsening symptoms. Elvira Cook PA-C     Follow-up Information     Follow up With Specialties Details Why Contact Info    Nelson Randolph MD Family Medicine Schedule an appointment as soon as possible for a visit in 1 week  [address] [place] [address] Dr [facility] EMERGENCY DEPT Emergency Medicine  As needed, If symptoms worsen [address] [facility] [address]  [phone]          Current Discharge Medication List      START taking these medications    Details   cephALEXin (Keflex) 500 mg capsule Take 1 Cap by mouth four (4) times daily for 7 days. Qty: 28 Cap, Refills: 0      ibuprofen (MOTRIN) 600 mg tablet Take 1 Tab by mouth every six (6) hours as needed for Pain.   Qty: 20 Tab, Refills: 0      benzonatate (Tessalon Perles) 100 mg capsule Take 1 Cap by mouth three (3) times daily as needed for Cough for up to 7 days.  Qty: 30 Cap, Refills: 0                 Diagnosis     Clinical Impression:   1. Acute cystitis with hematuria    2.  Cough

## 2020-10-09 NOTE — ED TRIAGE NOTES
Back pain x 1 week, urinary urgency, cough x 2 weeks. lmp over 1 month ago. Admits to snorting cocaine yesterday.

## 2020-10-09 NOTE — DISCHARGE INSTRUCTIONS
Patient Education        Cough: Care Instructions  Your Care Instructions     A cough is your body's response to something that bothers your throat or airways. Many things can cause a cough. You might cough because of a cold or the flu, bronchitis, or asthma. Smoking, postnasal drip, allergies, and stomach acid that backs up into your throat also can cause coughs. A cough is a symptom, not a disease. Most coughs stop when the cause, such as a cold, goes away. You can take a few steps at home to cough less and feel better. Follow-up care is a key part of your treatment and safety. Be sure to make and go to all appointments, and call your doctor if you are having problems. It's also a good idea to know your test results and keep a list of the medicines you take. How can you care for yourself at home? · Drink lots of water and other fluids. This helps thin the mucus and soothes a dry or sore throat. Honey or lemon juice in hot water or tea may ease a dry cough. · Take cough medicine as directed by your doctor. · Prop up your head on pillows to help you breathe and ease a dry cough. · Try cough drops to soothe a dry or sore throat. Cough drops don't stop a cough. Medicine-flavored cough drops are no better than candy-flavored drops or hard candy. · Do not smoke. Avoid secondhand smoke. If you need help quitting, talk to your doctor about stop-smoking programs and medicines. These can increase your chances of quitting for good. When should you call for help? Call 911 anytime you think you may need emergency care. For example, call if:    · You have severe trouble breathing. Call your doctor now or seek immediate medical care if:    · You cough up blood.     · You have new or worse trouble breathing.     · You have a new or higher fever.     · You have a new rash.    Watch closely for changes in your health, and be sure to contact your doctor if:    · You cough more deeply or more often, especially if you notice more mucus or a change in the color of your mucus.     · You have new symptoms, such as a sore throat, an earache, or sinus pain.     · You do not get better as expected. Where can you learn more? Go to http://www.gray.com/  Enter D279 in the search box to learn more about \"Cough: Care Instructions. \"  Current as of: 2020               Content Version: 12.6   [employer]. Care instructions adapted under license by [employer] (which disclaims liability or warranty for this information). If you have questions about a medical condition or this instruction, always ask your healthcare professional. [address] any warranty or liability for your use of this information. [employer] Activation    Thank you for requesting access to [employer]. Please follow the instructions below to securely access and download your online medical record. [employer] allows you to send messages to your doctor, view your test results, renew your prescriptions, schedule appointments, and more. How Do I Sign Up? 1. In your internet browser, go to [email or web address]  2. Click on the First Time User? Click Here link in the Sign In box. You will be redirect to the New Member Sign Up page. 3. Enter your [employer] Access Code exactly as it appears below. You will not need to use this code after youve completed the sign-up process. If you do not sign up before the expiration date, you must request a new code. [employer] Access Code: [email or web address]  Expires: 2020 10:17 PM (This is the date your [employer] access code will )    4. Enter the last four digits of your Social Security Number (xxxx) and Date of Birth (mm/dd/yyyy) as indicated and click Submit. You will be taken to the next sign-up page. 5. Create a [employer] ID.  This will be your [employer] login ID and cannot be changed, so think of one that is secure and easy to remember. 6. Create a [employer] password. You can change your password at any time. 7. Enter your Password Reset Question and Answer. This can be used at a later time if you forget your password. 8. Enter your e-mail address. You will receive e-mail notification when new information is available in [address] 9. Click Sign Up. You can now view and download portions of your medical record. 10. Click the Download Summary menu link to download a portable copy of your medical information. Additional Information    If you have questions, please visit the Frequently Asked Questions section of the [employer] website at [email or web address] Remember, [employer] is NOT to be used for urgent needs. For medical emergencies, dial 911.

## 2020-10-12 LAB
BACTERIA SPEC CULT: ABNORMAL
CC UR VC: ABNORMAL
SERVICE CMNT-IMP: ABNORMAL

## 2020-10-14 RX ORDER — CIPROFLOXACIN 500 MG/1
500 TABLET ORAL 2 TIMES DAILY
Qty: 14 TAB | Refills: 0 | Status: SHIPPED | OUTPATIENT
Start: 2020-10-14 | End: 2020-10-21

## 2020-10-14 NOTE — CALL BACK NOTE
3:05 PM 
Left voicemail message on patient's phone listed that her culture results indicate she needs an antibiotic and sent a prescription of Cipro to preferred pharmacy.  dwayne Shelton-

## 2020-12-22 ENCOUNTER — HOSPITAL ENCOUNTER (EMERGENCY)
Age: 30
Discharge: HOME | End: 2020-12-23
Attending: EMERGENCY MEDICINE
Payer: MEDICAID

## 2020-12-22 ENCOUNTER — APPOINTMENT (OUTPATIENT)
Dept: GENERAL RADIOLOGY | Age: 30
End: 2020-12-22
Attending: NURSE PRACTITIONER
Payer: MEDICAID

## 2020-12-22 VITALS
DIASTOLIC BLOOD PRESSURE: 65 MMHG | TEMPERATURE: 98.4 F | OXYGEN SATURATION: 98 % | SYSTOLIC BLOOD PRESSURE: 105 MMHG | HEART RATE: 83 BPM | RESPIRATION RATE: 16 BRPM

## 2020-12-22 DIAGNOSIS — S80.02XA CONTUSION OF LEFT KNEE, INITIAL ENCOUNTER: Primary | ICD-10-CM

## 2020-12-22 PROCEDURE — 99283 EMERGENCY DEPT VISIT LOW MDM: CPT

## 2020-12-22 PROCEDURE — 73564 X-RAY EXAM KNEE 4 OR MORE: CPT

## 2020-12-22 PROCEDURE — 90471 IMMUNIZATION ADMIN: CPT

## 2020-12-23 PROCEDURE — 90471 IMMUNIZATION ADMIN: CPT

## 2020-12-23 PROCEDURE — 74011250636 HC RX REV CODE- 250/636: Performed by: NURSE PRACTITIONER

## 2020-12-23 PROCEDURE — 90714 TD VACC NO PRESV 7 YRS+ IM: CPT | Performed by: NURSE PRACTITIONER

## 2020-12-23 RX ADMIN — TETANUS AND DIPHTHERIA TOXOIDS ADSORBED 0.5 ML: 2; 2 INJECTION INTRAMUSCULAR at 00:08
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
## 2021-01-29 ENCOUNTER — HOSPITAL ENCOUNTER (EMERGENCY)
Age: 31
Discharge: HOME | End: 2021-01-29
Attending: EMERGENCY MEDICINE
Payer: MEDICAID

## 2021-01-29 VITALS
BODY MASS INDEX: 25.78 KG/M2 | RESPIRATION RATE: 14 BRPM | TEMPERATURE: 98.3 F | HEART RATE: 74 BPM | WEIGHT: 115 LBS | DIASTOLIC BLOOD PRESSURE: 81 MMHG | SYSTOLIC BLOOD PRESSURE: 126 MMHG | OXYGEN SATURATION: 100 %

## 2021-01-29 DIAGNOSIS — T78.40XA ALLERGIC REACTION, INITIAL ENCOUNTER: Primary | ICD-10-CM

## 2021-01-29 PROCEDURE — 74011250637 HC RX REV CODE- 250/637: Performed by: EMERGENCY MEDICINE

## 2021-01-29 PROCEDURE — 99283 EMERGENCY DEPT VISIT LOW MDM: CPT

## 2021-01-29 RX ORDER — DIPHENHYDRAMINE HCL 50 MG
50 CAPSULE ORAL
Status: COMPLETED | OUTPATIENT
Start: 2021-01-29 | End: 2021-01-29

## 2021-01-29 RX ADMIN — DIPHENHYDRAMINE HYDROCHLORIDE 50 MG: 50 CAPSULE ORAL at 03:17

## 2021-01-29 NOTE — ED PROVIDER NOTES
EMERGENCY DEPARTMENT HISTORY AND PHYSICAL EXAM    2:58 AM      Date: 1/29/2021  Patient Name: Maurisio Stanley    History of Presenting Illness     Chief Complaint   Patient presents with    Allergic Reaction         History Provided By: Patient    Additional History (Context): Maurisio Stanley is a 35 y.o. female presenting with allergic reaction. Reports an allergy to latx, unsure if she had exposure, however earlier tonight she noted acute onset of her right neck \"welting up\". States that the area was burning and felt her neck muscles were \"tight\" underneath. Reports she feels her throat swelling and is having difficulty breathing. Also reports area of itching to left posterior hip. Denies any other symptoms. States she took Advil with no relief. PCP: Angel Benjamin MD    Current Outpatient Medications   Medication Sig Dispense Refill    ibuprofen (MOTRIN) 600 mg tablet Take 1 Tab by mouth every six (6) hours as needed for Pain. 20 Tab 0    pseudoephedrine CR (SUDAFED 12 HOUR) 120 mg CR tablet Take 1 Tab by mouth two (2) times daily as needed for Congestion. 14 Tab 0    ondansetron (ZOFRAN ODT) 4 mg disintegrating tablet Take 1 Tab by mouth every eight (8) hours as needed for Nausea. 15 Tab 0    amitriptyline (ELAVIL) 25 mg tablet Take 25 mg by mouth nightly. Past History     Past Medical History:  Past Medical History:   Diagnosis Date    Bipolar 1 disorder, depressed ([facility] [address]     Cocaine abuse ([facility] [address]     Neurological disorder     Pancreatitis     Psychiatric disorder        Past Surgical History:  Past Surgical History:   Procedure Laterality Date    HX PELVIC LAPAROSCOPY      for perforated IUD removal    UPPER GI ENDOSCOPY,BIOPSY  3/29/2017            Family History:  No family history on file. Social History:  Social History     Tobacco Use    Smoking status: Never Smoker    Smokeless tobacco: Never Used   Substance Use Topics    Alcohol use:  Yes     Alcohol/week: 0.8 standard drinks Types: 1 Standard drinks or equivalent per week     Comment: 5-6 shots/week    Drug use: Yes     Frequency: 7.0 times per week     Types: Marijuana, Cocaine       Allergies: Allergies   Allergen Reactions    Latex, Natural Rubber Rash    Doxycycline Hives         Review of Systems       Review of Systems   Constitutional: Negative for chills and fever. HENT: Negative for congestion, ear pain and sore throat. Eyes: Negative for pain. Respiratory: Negative for cough, shortness of breath and wheezing. Cardiovascular: Negative for chest pain and leg swelling. Gastrointestinal: Negative for abdominal pain, diarrhea, nausea and vomiting. Genitourinary: Negative for dysuria and flank pain. Musculoskeletal: Positive for neck pain (Neck \"tightness\" on right side). Negative for gait problem and myalgias. Skin: Positive for rash. Negative for color change. Neurological: Negative for dizziness and headaches. Hematological: Does not bruise/bleed easily. Psychiatric/Behavioral: Negative for behavioral problems. Physical Exam     Visit Vitals  /81 (BP 1 Location: Left upper arm)   Pulse 74   Temp 98.3 °F (36.8 °C)   Resp 14   Wt 52.2 kg (115 lb)   SpO2 100%   BMI 25.78 kg/m²         Physical Exam  Constitutional:       General: She is not in acute distress. Appearance: Normal appearance. She is not ill-appearing or toxic-appearing. HENT:      Head: Normocephalic and atraumatic. Right Ear: External ear normal.      Left Ear: External ear normal.      Nose: Nose normal.      Mouth/Throat:      Mouth: Mucous membranes are moist.      Comments: No edema of pharynx, uvula, tongue, lips  Eyes:      Extraocular Movements: Extraocular movements intact. Conjunctiva/sclera: Conjunctivae normal.      Pupils: Pupils are equal, round, and reactive to light. Neck:      Musculoskeletal: Normal range of motion and neck supple. Comments: No swelling or muscle spasm.  Patient able to range neck with no difficulty. Cardiovascular:      Rate and Rhythm: Normal rate and regular rhythm. Pulses: Normal pulses. Heart sounds: Normal heart sounds. Pulmonary:      Effort: Pulmonary effort is normal.      Breath sounds: Normal breath sounds. No wheezing. Abdominal:      General: There is no distension. Palpations: Abdomen is soft. There is no mass. Tenderness: There is no abdominal tenderness. There is no guarding. Musculoskeletal: Normal range of motion. General: No swelling or deformity. Skin:     General: Skin is warm and dry. Capillary Refill: Capillary refill takes less than 2 seconds. Findings: Rash (R neck mild urticaria with excoriations, erythematous) present. Neurological:      General: No focal deficit present. Mental Status: She is alert and oriented to person, place, and time. Psychiatric:         Mood and Affect: Mood normal.         Behavior: Behavior normal.           Diagnostic Study Results     Labs -  No results found for this or any previous visit (from the past 12 hour(s)). Radiologic Studies -   No orders to display         Medical Decision Making   I reviewed available nursing notes, past medical history, past surgical history, family history and social history. Vital Signs-Reviewed the patient's vital signs. Records Reviewed: Nursing Notes and Old Medical Records    Medical Decision Making:  Patient presents with complaint of allergic reaction, possibly to latex, although patient denies known exposure. Exam is benign, with no oropharyngeal swelling, no stridor, dyspnea, or wheezing. Mild urticaria noted to R neck with some excoriations. Patient with no limited ROM of neck. Further workup not indicated at this time. ED Course: Progress Notes, Reevaluation, and Consults:  Patient seen and examined. She is in no distress. Benadryl 50mg PO ordered and given. Patient stable for discharge home.     Diagnosis Clinical Impression:   1. Allergic reaction, initial encounter        Disposition: Patient discharged home. DISCHARGE NOTE:     Patient has no new complaints, changes, or physical findings. Care plan outlined and precautions discussed. All results reviewed with the patient. Patient's questions and concerns were addressed. Patient was instructed and agrees to follow up with their PMD, as well as to return to the ED upon further deterioration. Patient is stable for discharge at this time.     Follow-up Information     Follow up With Specialties Details Why Contact Info    Wilfredo Cotton MD Family Medicine Schedule an appointment as soon as possible for a visit  As needed [address] [place] [address] Dr [facility] EMERGENCY DEPT Emergency Medicine Go to  As needed, If symptoms worsen [address] [facility] [address]  [phone]           Current Discharge Medication List

## 2021-12-27 ENCOUNTER — HOSPITAL ENCOUNTER (EMERGENCY)
Age: 31
Discharge: HOME | End: 2021-12-28
Attending: EMERGENCY MEDICINE
Payer: MEDICAID

## 2021-12-27 DIAGNOSIS — R11.2 NAUSEA AND VOMITING, INTRACTABILITY OF VOMITING NOT SPECIFIED, UNSPECIFIED VOMITING TYPE: ICD-10-CM

## 2021-12-27 DIAGNOSIS — R10.31 ABDOMINAL PAIN, RIGHT LOWER QUADRANT: Primary | ICD-10-CM

## 2021-12-27 LAB
ALBUMIN SERPL-MCNC: 3.5 G/DL (ref 3.4–5)
ALBUMIN/GLOB SERPL: 1 {RATIO} (ref 0.8–1.7)
ALP SERPL-CCNC: 101 U/L (ref 45–117)
ALT SERPL-CCNC: 57 U/L (ref 13–56)
ANION GAP SERPL CALC-SCNC: 4 MMOL/L (ref 3–18)
AST SERPL-CCNC: 22 U/L (ref 10–38)
BASOPHILS # BLD: 0 K/UL (ref 0–0.1)
BASOPHILS NFR BLD: 1 % (ref 0–2)
BILIRUB SERPL-MCNC: 0.1 MG/DL (ref 0.2–1)
BUN SERPL-MCNC: 19 MG/DL (ref 7–18)
BUN/CREAT SERPL: 29 (ref 12–20)
CALCIUM SERPL-MCNC: 8.9 MG/DL (ref 8.5–10.1)
CHLORIDE SERPL-SCNC: 107 MMOL/L (ref 100–111)
CO2 SERPL-SCNC: 30 MMOL/L (ref 21–32)
CREAT SERPL-MCNC: 0.65 MG/DL (ref 0.6–1.3)
DIFFERENTIAL METHOD BLD: ABNORMAL
EOSINOPHIL # BLD: 0.1 K/UL (ref 0–0.4)
EOSINOPHIL NFR BLD: 2 % (ref 0–5)
ERYTHROCYTE [DISTWIDTH] IN BLOOD BY AUTOMATED COUNT: 11.9 % (ref 11.6–14.5)
GLOBULIN SER CALC-MCNC: 3.6 G/DL (ref 2–4)
GLUCOSE SERPL-MCNC: 72 MG/DL (ref 74–99)
HCG SERPL-ACNC: <1 MIU/ML (ref 0–10)
HCT VFR BLD AUTO: 40 % (ref 35–45)
HGB BLD-MCNC: 13.4 G/DL (ref 12–16)
IMM GRANULOCYTES # BLD AUTO: 0 K/UL (ref 0–0.04)
IMM GRANULOCYTES NFR BLD AUTO: 1 % (ref 0–0.5)
LYMPHOCYTES # BLD: 2.6 K/UL (ref 0.9–3.6)
LYMPHOCYTES NFR BLD: 33 % (ref 21–52)
MCH RBC QN AUTO: 33.8 PG (ref 24–34)
MCHC RBC AUTO-ENTMCNC: 33.5 G/DL (ref 31–37)
MCV RBC AUTO: 101 FL (ref 78–100)
MONOCYTES # BLD: 0.6 K/UL (ref 0.05–1.2)
MONOCYTES NFR BLD: 8 % (ref 3–10)
NEUTS SEG # BLD: 4.6 K/UL (ref 1.8–8)
NEUTS SEG NFR BLD: 57 % (ref 40–73)
NRBC # BLD: 0 K/UL (ref 0–0.01)
NRBC BLD-RTO: 0 PER 100 WBC
PLATELET # BLD AUTO: 263 K/UL (ref 135–420)
PMV BLD AUTO: 9.8 FL (ref 9.2–11.8)
POTASSIUM SERPL-SCNC: 4.1 MMOL/L (ref 3.5–5.5)
PROT SERPL-MCNC: 7.1 G/DL (ref 6.4–8.2)
RBC # BLD AUTO: 3.96 M/UL (ref 4.2–5.3)
SODIUM SERPL-SCNC: 141 MMOL/L (ref 136–145)
WBC # BLD AUTO: 8.1 K/UL (ref 4.6–13.2)

## 2021-12-27 PROCEDURE — 99284 EMERGENCY DEPT VISIT MOD MDM: CPT

## 2021-12-27 PROCEDURE — 86900 BLOOD TYPING SEROLOGIC ABO: CPT

## 2021-12-27 PROCEDURE — 81001 URINALYSIS AUTO W/SCOPE: CPT

## 2021-12-27 PROCEDURE — 84702 CHORIONIC GONADOTROPIN TEST: CPT

## 2021-12-27 PROCEDURE — 74011250637 HC RX REV CODE- 250/637: Performed by: EMERGENCY MEDICINE

## 2021-12-27 PROCEDURE — 96361 HYDRATE IV INFUSION ADD-ON: CPT

## 2021-12-27 PROCEDURE — 86850 RBC ANTIBODY SCREEN: CPT

## 2021-12-27 PROCEDURE — 96374 THER/PROPH/DIAG INJ IV PUSH: CPT

## 2021-12-27 PROCEDURE — 80053 COMPREHEN METABOLIC PANEL: CPT

## 2021-12-27 PROCEDURE — 74011250636 HC RX REV CODE- 250/636: Performed by: EMERGENCY MEDICINE

## 2021-12-27 PROCEDURE — 85025 COMPLETE CBC W/AUTO DIFF WBC: CPT

## 2021-12-27 PROCEDURE — 99285 EMERGENCY DEPT VISIT HI MDM: CPT

## 2021-12-27 RX ORDER — ACETAMINOPHEN 325 MG/1
650 TABLET ORAL
Status: COMPLETED | OUTPATIENT
Start: 2021-12-27 | End: 2021-12-27

## 2021-12-27 RX ORDER — METOCLOPRAMIDE HYDROCHLORIDE 5 MG/ML
10 INJECTION INTRAMUSCULAR; INTRAVENOUS
Status: COMPLETED | OUTPATIENT
Start: 2021-12-27 | End: 2021-12-27

## 2021-12-27 RX ADMIN — SODIUM CHLORIDE 1000 ML: 9 INJECTION, SOLUTION INTRAVENOUS at 23:34

## 2021-12-27 RX ADMIN — ACETAMINOPHEN 650 MG: 325 TABLET ORAL at 23:30

## 2021-12-27 RX ADMIN — METOCLOPRAMIDE HYDROCHLORIDE 10 MG: 5 INJECTION INTRAMUSCULAR; INTRAVENOUS at 23:37

## 2021-12-28 ENCOUNTER — APPOINTMENT (OUTPATIENT)
Dept: ULTRASOUND IMAGING | Age: 31
End: 2021-12-28
Attending: EMERGENCY MEDICINE
Payer: MEDICAID

## 2021-12-28 VITALS
DIASTOLIC BLOOD PRESSURE: 46 MMHG | BODY MASS INDEX: 28.12 KG/M2 | WEIGHT: 125 LBS | RESPIRATION RATE: 16 BRPM | HEIGHT: 56 IN | HEART RATE: 75 BPM | TEMPERATURE: 98.6 F | OXYGEN SATURATION: 98 % | SYSTOLIC BLOOD PRESSURE: 96 MMHG

## 2021-12-28 LAB
ABO + RH BLD: NORMAL
APPEARANCE UR: ABNORMAL
BACTERIA URNS QL MICRO: ABNORMAL /HPF
BILIRUB UR QL: NEGATIVE
BLOOD GROUP ANTIBODIES SERPL: NORMAL
COLOR UR: YELLOW
EPITH CASTS URNS QL MICRO: ABNORMAL /LPF (ref 0–5)
GLUCOSE UR STRIP.AUTO-MCNC: NEGATIVE MG/DL
HGB UR QL STRIP: ABNORMAL
KETONES UR QL STRIP.AUTO: NEGATIVE MG/DL
LEUKOCYTE ESTERASE UR QL STRIP.AUTO: ABNORMAL
NITRITE UR QL STRIP.AUTO: NEGATIVE
PH UR STRIP: 6 [PH] (ref 5–8)
PROT UR STRIP-MCNC: 30 MG/DL
RBC #/AREA URNS HPF: ABNORMAL /HPF (ref 0–5)
SERVICE CMNT-IMP: NORMAL
SP GR UR REFRACTOMETRY: >1.03 (ref 1–1.03)
UROBILINOGEN UR QL STRIP.AUTO: 1 EU/DL (ref 0.2–1)
WBC URNS QL MICRO: ABNORMAL /HPF (ref 0–5)
WET PREP GENITAL: NORMAL

## 2021-12-28 PROCEDURE — 96361 HYDRATE IV INFUSION ADD-ON: CPT

## 2021-12-28 PROCEDURE — 87210 SMEAR WET MOUNT SALINE/INK: CPT

## 2021-12-28 PROCEDURE — 76830 TRANSVAGINAL US NON-OB: CPT

## 2021-12-28 PROCEDURE — 87491 CHLMYD TRACH DNA AMP PROBE: CPT

## 2021-12-28 RX ORDER — NITROFURANTOIN 25; 75 MG/1; MG/1
100 CAPSULE ORAL 2 TIMES DAILY
Qty: 10 CAPSULE | Refills: 0 | Status: SHIPPED | OUTPATIENT
Start: 2021-12-28 | End: 2022-01-02

## 2021-12-28 RX ORDER — METOCLOPRAMIDE 10 MG/1
10 TABLET ORAL
Qty: 12 TABLET | Refills: 0 | Status: SHIPPED | OUTPATIENT
Start: 2021-12-28 | End: 2022-01-07

## 2021-12-28 NOTE — ED PROVIDER NOTES
EMERGENCY DEPARTMENT HISTORY AND PHYSICAL EXAM    Date: 12/27/2021  Patient Name: Trinidad Kim    History of Presenting Illness     Chief Complaint   Patient presents with    Abdominal Pain         History Provided By: Patient    Trinidad Kim is a 14-JKOY-O G2, P1 currently approximately 10 weeks presents for evaluation of abdominal pain. Patient states that she has had abdominal pain with radiation towards her back over the last several days. She has had some slight vaginal spotting as well. Patient has not previously had an ultrasound for this pregnancy. She reports associated nausea, vomiting, decreased oral intake. Patient has not had any vaginal discharge, dysuria, urinary frequency, hematuria, fever. Patient has not taken any medications for the pain. PCP: Stephy Rutledge MD    Current Outpatient Medications   Medication Sig Dispense Refill    metoclopramide HCl (Reglan) 10 mg tablet Take 1 Tablet by mouth every six (6) hours as needed for Nausea for up to 10 days. 12 Tablet 0    nitrofurantoin, macrocrystal-monohydrate, (Macrobid) 100 mg capsule Take 1 Capsule by mouth two (2) times a day for 5 days. 10 Capsule 0    ibuprofen (MOTRIN) 600 mg tablet Take 1 Tab by mouth every six (6) hours as needed for Pain. 20 Tab 0    pseudoephedrine CR (SUDAFED 12 HOUR) 120 mg CR tablet Take 1 Tab by mouth two (2) times daily as needed for Congestion. 14 Tab 0    ondansetron (ZOFRAN ODT) 4 mg disintegrating tablet Take 1 Tab by mouth every eight (8) hours as needed for Nausea. 15 Tab 0    amitriptyline (ELAVIL) 25 mg tablet Take 25 mg by mouth nightly.          Past History     Past Medical History:  Past Medical History:   Diagnosis Date    B[facility] 1 disorder[facility] depressed ([facility] [address]     Cocaine abuse ([facility] [address]     Neurological disorder     Pancreatitis     Psychiatric disorder        Past Surgical History:  Past Surgical History:   Procedure Laterality Date    HX PELVIC LAPAROSCOPY      for perforated IUD removal    UPPER GI ENDOSCOPY,BIOPSY  3/29/2017            Family History:  No family history on file. Social History:  Social History     Tobacco Use    Smoking status: Never Smoker    Smokeless tobacco: Never Used   Substance Use Topics    Alcohol use: Yes     Alcohol/week: 0.8 standard drinks     Types: 1 Standard drinks or equivalent per week     Comment: 5-6 shots/week    Drug use: Yes     Frequency: 7.0 times per week     Types: Marijuana, Cocaine       Allergies: Allergies   Allergen Reactions    Latex, Natural Rubber Rash    Doxycycline Hives         Review of Systems   Review of Systems   Constitutional: Negative for activity change and fever. HENT: Negative for congestion and sore throat. Eyes: Negative for discharge. Respiratory: Negative for apnea. Cardiovascular: Negative for chest pain. Gastrointestinal: Positive for abdominal pain, nausea and vomiting. Negative for abdominal distention. Genitourinary: Positive for vaginal bleeding. Negative for dysuria and flank pain. Musculoskeletal: Negative for arthralgias. Skin: Negative for rash. Neurological: Negative for dizziness and weakness. Hematological: Negative for adenopathy. Psychiatric/Behavioral: Negative for agitation. All other systems reviewed and are negative.       Physical Exam     Vitals:    12/27/21 2026 12/27/21 2341 12/28/21 0000   BP: 110/65 (!) 90/49 (!) 96/46   Pulse: 89 75    Resp: 16 16    Temp: 98.6 °F (37 °C)     SpO2: 98% 99% 98%   Weight: 56.7 kg (125 lb)     Height: 4' 8\" (1.422 m)       Physical Exam    Nursing notes and vital signs reviewed    Constitutional: Non toxic appearing, moderate distress  Head: Normocephalic, Atraumatic  Eyes: EOMI  Neck: Supple  Cardiovascular: Regular rate and rhythm, no murmurs, rubs, or gallops  Chest: Normal work of breathing and chest excursion bilaterally  Lungs: Clear to ausculation bilaterally  Abdomen: Soft, non tender, non distended, normoactive bowel sounds  Back: No evidence of trauma or deformity  Extremities: No evidence of trauma or deformity, no LE edema  Skin: Warm and dry, normal cap refill  Neuro: Alert and appropriate, CN intact, normal speech, strength and sensation full and symmetric bilaterally, normal gait, normal coordination  Psychiatric: Normal mood and affect      Diagnostic Study Results     Labs -     Recent Results (from the past 12 hour(s))   METABOLIC PANEL, COMPREHENSIVE    Collection Time: 12/27/21  9:59 PM   Result Value Ref Range    Sodium 141 136 - 145 mmol/L    Potassium 4.1 3.5 - 5.5 mmol/L    Chloride 107 100 - 111 mmol/L    CO2 30 21 - 32 mmol/L    Anion gap 4 3.0 - 18 mmol/L    Glucose 72 (L) 74 - 99 mg/dL    BUN 19 (H) 7.0 - 18 MG/DL    Creatinine 0.65 0.6 - 1.3 MG/DL    BUN/Creatinine ratio 29 (H) 12 - 20      GFR est AA >60 >60 ml/min/1.73m2    GFR est non-AA >60 >60 ml/min/1.73m2    Calcium 8.9 8.5 - 10.1 MG/DL    Bilirubin, total 0.1 (L) 0.2 - 1.0 MG/DL    ALT (SGPT) 57 (H) 13 - 56 U/L    AST (SGOT) 22 10 - 38 U/L    Alk. phosphatase 101 45 - 117 U/L    Protein, total 7.1 6.4 - 8.2 g/dL    Albumin 3.5 3.4 - 5.0 g/dL    Globulin 3.6 2.0 - 4.0 g/dL    A-G Ratio 1.0 0.8 - 1.7     CBC WITH AUTOMATED DIFF    Collection Time: 12/27/21  9:59 PM   Result Value Ref Range    WBC 8.1 4.6 - 13.2 K/uL    RBC 3.96 (L) 4.20 - 5.30 M/uL    HGB 13.4 12.0 - 16.0 g/dL    HCT 40.0 35.0 - 45.0 %    .0 (H) 78.0 - 100.0 FL    MCH 33.8 24.0 - 34.0 PG    MCHC 33.5 31.0 - 37.0 g/dL    RDW 11.9 11.6 - 14.5 %    PLATELET 568 038 - 053 K/uL    MPV 9.8 9.2 - 11.8 FL    NRBC 0.0 0  WBC    ABSOLUTE NRBC 0.00 0.00 - 0.01 K/uL    NEUTROPHILS 57 40 - 73 %    LYMPHOCYTES 33 21 - 52 %    MONOCYTES 8 3 - 10 %    EOSINOPHILS 2 0 - 5 %    BASOPHILS 1 0 - 2 %    IMMATURE GRANULOCYTES 1 (H) 0.0 - 0.5 %    ABS. NEUTROPHILS 4.6 1.8 - 8.0 K/UL    ABS. LYMPHOCYTES 2.6 0.9 - 3.6 K/UL    ABS. MONOCYTES 0.6 0.05 - 1.2 K/UL    ABS.  EOSINOPHILS 0.1 0.0 - 0.4 K/UL    ABS. BASOPHILS 0.0 0.0 - 0.1 K/UL    ABS. IMM. GRANS. 0.0 0.00 - 0.04 K/UL    DF AUTOMATED     BETA HCG, QT    Collection Time: 12/27/21  9:59 PM   Result Value Ref Range    Beta HCG, QT <1 0 - 10 MIU/ML   URINALYSIS W/ RFLX MICROSCOPIC    Collection Time: 12/27/21 11:29 PM   Result Value Ref Range    Color YELLOW      Appearance CLOUDY      Specific gravity >1.030 (H) 1.005 - 1.030    pH (UA) 6.0 5.0 - 8.0      Protein 30 (A) NEG mg/dL    Glucose Negative NEG mg/dL    Ketone Negative NEG mg/dL    Bilirubin Negative NEG      Blood LARGE (A) NEG      Urobilinogen 1.0 0.2 - 1.0 EU/dL    Nitrites Negative NEG      Leukocyte Esterase SMALL (A) NEG     BLOOD TYPE, (ABO+RH)    Collection Time: 12/27/21 11:29 PM   Result Value Ref Range    ABO/Rh(D) O NEGATIVE     Antibody screen NEG    URINE MICROSCOPIC ONLY    Collection Time: 12/27/21 11:29 PM   Result Value Ref Range    WBC 4 to 10 0 - 5 /hpf    RBC 4 to 10 0 - 5 /hpf    Epithelial cells 2+ 0 - 5 /lpf    Bacteria FEW (A) NEG /hpf   WET PREP    Collection Time: 12/28/21  1:13 AM    Specimen: Vagina   Result Value Ref Range    Special Requests: NO SPECIAL REQUESTS      Wet prep NO YEAST,TRICHOMONAS OR CLUE CELLS NOTED         Radiologic Studies -   US TRANSVAGINAL W DOPPLER   Final Result      Unremarkable pelvic ultrasound examination, with no evidence ovarian torsion,   and no acute or significant findings. ______________        CT Results  (Last 48 hours)    None        CXR Results  (Last 48 hours)    None          Medications given in the ED-  Medications   acetaminophen (TYLENOL) tablet 650 mg (650 mg Oral Given 12/27/21 2330)   sodium chloride 0.9 % bolus infusion 1,000 mL (0 mL IntraVENous IV Completed 12/28/21 0118)   metoclopramide HCl (REGLAN) injection 10 mg (10 mg IntraVENous Given 12/27/21 2337)         Medical Decision Making   I am the first provider for this patient.     I reviewed the vital signs, available nursing notes, past medical history, past surgical history, family history and social history. Vital Signs-Reviewed the patient's vital signs. Pulse Oximetry Analysis - 98% on room air, not hypoxic     Records Reviewed: Old Medical Records    Provider Notes (Medical Decision Making): Yaniv Schumacher is a 35-JOHP-YOJ female presents for evaluation of abdominal pain in the setting of reported first trimester pregnancy. On arrival patient is afebrile, nontoxic-appearing and hemodynamically stable. Patient did have minimal tenderness in the right lower quadrant. CBC, CMP, urinalysis, pelvic ultrasound, wet prep, beta-hCG obtained. Beta-hCG is found to be less than 1 with no evidence of pregnancy. Transvaginal ultrasound is negative with normal blood flow to bilateral ovaries. Patient does have evidence of a slight UTI with otherwise negative wet prep. She will be discharged on Macrobid, naproxen. Patient otherwise has not had any further episodes of nausea, vomiting. Abdomen is soft, nontender on repeat evaluation with low suspicion for appendicitis, diverticulitis, obstruction or other acute pathology. Patient is stable for discharge with follow-up with PCP. Procedures:  Procedures    ED Course:     Diagnosis and Disposition     Critical Care:     DISCHARGE NOTE:    Herlinda Correa  results have been reviewed with her. She has been counseled regarding her diagnosis, treatment, and plan. She verbally conveys understanding and agreement of the signs, symptoms, diagnosis, treatment and prognosis and additionally agrees to follow up as discussed. She also agrees with the care-plan and conveys that all of her questions have been answered. I have also provided discharge instructions for her that include: educational information regarding their diagnosis and treatment, and list of reasons why they would want to return to the ED prior to their follow-up appointment, should her condition change.  She has been provided with education for proper emergency department utilization. CLINICAL IMPRESSION:    1. Abdominal pain, right lower quadrant    2. Nausea and vomiting, intractability of vomiting not specified, unspecified vomiting type        PLAN:  1. D/C Home  2. Discharge Medication List as of 12/28/2021  1:56 AM      START taking these medications    Details   metoclopramide HCl (Reglan) 10 mg tablet Take 1 Tablet by mouth every six (6) hours as needed for Nausea for up to 10 days. , Normal, Disp-12 Tablet, R-0      nitrofurantoin, macrocrystal-monohydrate, (Macrobid) 100 mg capsule Take 1 Capsule by mouth two (2) times a day for 5 days. , Normal, Disp-10 Capsule, R-0         CONTINUE these medications which have NOT CHANGED    Details   ibuprofen (MOTRIN) 600 mg tablet Take 1 Tab by mouth every six (6) hours as needed for Pain., Normal, Disp-20 Tab,R-0      pseudoephedrine CR (SUDAFED 12 HOUR) 120 mg CR tablet Take 1 Tab by mouth two (2) times daily as needed for Congestion. , Print, Disp-14 Tab, R-0      ondansetron (ZOFRAN ODT) 4 mg disintegrating tablet Take 1 Tab by mouth every eight (8) hours as needed for Nausea. , Print, Disp-15 Tab, R-0      amitriptyline (ELAVIL) 25 mg tablet Take 25 mg by mouth nightly., Historical Med           3. Follow-up Information     Follow up With Specialties Details Why Contact Info    Jaydon Hoang MD Family Medicine   [address] [id number] [phone]      [facility] EMERGENCY DEPT Emergency Medicine  As needed, If symptoms worsen [address]  [facility] [address]  [phone]        _______________________________      Please note that this dictation was completed with [employer] voice recognition software. Quite often unanticipated grammatical, syntax, homophones, and other interpretive errors are inadvertently transcribed by the computer software. Please disregard these errors. Please excuse any errors that have escaped final proofreading.

## 2021-12-28 NOTE — ED TRIAGE NOTES
reports being approx 10 wks-unable to give LMP date    Endorses genrealized abd pain/intermittant vomiting and, \"unable to keep anything down\" x 2-3 days. Pt immediately asked for water during triage.  Has not notified OB/GYN of sx

## 2022-03-18 PROBLEM — K85.90 PANCREATITIS, ACUTE: Status: ACTIVE | Noted: 2017-03-24

## 2022-03-18 PROBLEM — R10.84 ABDOMINAL PAIN, ACUTE, GENERALIZED: Status: ACTIVE | Noted: 2017-03-24

## 2022-03-19 PROBLEM — N12 PYELONEPHRITIS: Status: ACTIVE | Noted: 2018-05-03

## 2022-03-20 PROBLEM — R78.81 BACTEREMIA: Status: ACTIVE | Noted: 2018-05-03

## 2022-07-14 ENCOUNTER — HOSPITAL ENCOUNTER (EMERGENCY)
Age: 32
Discharge: LEFT BEFORE DISCHARGE | End: 2022-07-14
Attending: EMERGENCY MEDICINE
Payer: MEDICAID

## 2022-07-14 ENCOUNTER — APPOINTMENT (OUTPATIENT)
Dept: CT IMAGING | Age: 32
End: 2022-07-14
Attending: EMERGENCY MEDICINE
Payer: MEDICAID

## 2022-07-14 ENCOUNTER — HOSPITAL ENCOUNTER (EMERGENCY)
Age: 32
Discharge: HOME | End: 2022-07-14
Attending: EMERGENCY MEDICINE
Payer: MEDICAID

## 2022-07-14 ENCOUNTER — APPOINTMENT (OUTPATIENT)
Dept: GENERAL RADIOLOGY | Age: 32
End: 2022-07-14
Attending: EMERGENCY MEDICINE
Payer: MEDICAID

## 2022-07-14 VITALS
SYSTOLIC BLOOD PRESSURE: 101 MMHG | DIASTOLIC BLOOD PRESSURE: 83 MMHG | TEMPERATURE: 98.2 F | BODY MASS INDEX: 25.78 KG/M2 | RESPIRATION RATE: 16 BRPM | WEIGHT: 115 LBS | HEART RATE: 117 BPM | OXYGEN SATURATION: 99 %

## 2022-07-14 VITALS
BODY MASS INDEX: 25.87 KG/M2 | RESPIRATION RATE: 16 BRPM | SYSTOLIC BLOOD PRESSURE: 119 MMHG | OXYGEN SATURATION: 98 % | WEIGHT: 115 LBS | TEMPERATURE: 98.6 F | DIASTOLIC BLOOD PRESSURE: 74 MMHG | HEART RATE: 70 BPM | HEIGHT: 56 IN

## 2022-07-14 DIAGNOSIS — S46.912A STRAIN OF LEFT SHOULDER, INITIAL ENCOUNTER: Primary | ICD-10-CM

## 2022-07-14 DIAGNOSIS — S01.81XA FACIAL LACERATION, INITIAL ENCOUNTER: ICD-10-CM

## 2022-07-14 PROCEDURE — 74011250637 HC RX REV CODE- 250/637: Performed by: EMERGENCY MEDICINE

## 2022-07-14 PROCEDURE — 74011250636 HC RX REV CODE- 250/636: Performed by: EMERGENCY MEDICINE

## 2022-07-14 PROCEDURE — 99284 EMERGENCY DEPT VISIT MOD MDM: CPT

## 2022-07-14 PROCEDURE — 90715 TDAP VACCINE 7 YRS/> IM: CPT | Performed by: EMERGENCY MEDICINE

## 2022-07-14 PROCEDURE — 70450 CT HEAD/BRAIN W/O DYE: CPT

## 2022-07-14 PROCEDURE — 73030 X-RAY EXAM OF SHOULDER: CPT

## 2022-07-14 PROCEDURE — 75810000275 HC EMERGENCY DEPT VISIT NO LEVEL OF CARE

## 2022-07-14 PROCEDURE — 72125 CT NECK SPINE W/O DYE: CPT

## 2022-07-14 PROCEDURE — 90471 IMMUNIZATION ADMIN: CPT

## 2022-07-14 RX ORDER — IBUPROFEN 600 MG/1
600 TABLET ORAL
Qty: 20 TABLET | Refills: 0 | Status: SHIPPED | OUTPATIENT
Start: 2022-07-14

## 2022-07-14 RX ORDER — IBUPROFEN 400 MG/1
800 TABLET ORAL
Status: COMPLETED | OUTPATIENT
Start: 2022-07-14 | End: 2022-07-14

## 2022-07-14 RX ADMIN — IBUPROFEN 800 MG: 400 TABLET, FILM COATED ORAL at 20:58

## 2022-07-14 RX ADMIN — TETANUS TOXOID, REDUCED DIPHTHERIA TOXOID AND ACELLULAR PERTUSSIS VACCINE, ADSORBED 0.5 ML: 5; 2.5; 8; 8; 2.5 SUSPENSION INTRAMUSCULAR at 19:25
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
## 2022-07-15 NOTE — ROUTINE PROCESS
Felice Jameson is a 33 y.o. female that was discharged in stable. Pt was accompanied by friend. Pt is not driving. The patients diagnosis, condition and treatment were explained to  patient and aftercare instructions were given. The patient verbalized understanding. Patient armband removed and shredded.

## 2022-08-17 ENCOUNTER — APPOINTMENT (OUTPATIENT)
Dept: ULTRASOUND IMAGING | Age: 32
End: 2022-08-17
Attending: PHYSICIAN ASSISTANT
Payer: MEDICAID

## 2022-08-17 ENCOUNTER — HOSPITAL ENCOUNTER (EMERGENCY)
Age: 32
Discharge: HOME | End: 2022-08-18
Attending: STUDENT IN AN ORGANIZED HEALTH CARE EDUCATION/TRAINING PROGRAM
Payer: MEDICAID

## 2022-08-17 DIAGNOSIS — O20.0 ABORTION, THREATENED, EARLY PREGNANCY: Primary | ICD-10-CM

## 2022-08-17 DIAGNOSIS — F14.90 COCAINE USE: ICD-10-CM

## 2022-08-17 DIAGNOSIS — N39.0 URINARY TRACT INFECTION WITH HEMATURIA, SITE UNSPECIFIED: ICD-10-CM

## 2022-08-17 DIAGNOSIS — R31.9 URINARY TRACT INFECTION WITH HEMATURIA, SITE UNSPECIFIED: ICD-10-CM

## 2022-08-17 DIAGNOSIS — F12.90 MARIJUANA USE: ICD-10-CM

## 2022-08-17 LAB
ABO + RH BLD: NORMAL
ALBUMIN SERPL-MCNC: 3.5 G/DL (ref 3.4–5)
ALBUMIN/GLOB SERPL: 1.3 {RATIO} (ref 0.8–1.7)
ALP SERPL-CCNC: 70 U/L (ref 45–117)
ALT SERPL-CCNC: 20 U/L (ref 13–56)
AMPHET UR QL SCN: NEGATIVE
ANION GAP SERPL CALC-SCNC: 5 MMOL/L (ref 3–18)
APPEARANCE UR: ABNORMAL
AST SERPL-CCNC: 17 U/L (ref 10–38)
BARBITURATES UR QL SCN: NEGATIVE
BASOPHILS # BLD: 0 K/UL (ref 0–0.1)
BASOPHILS NFR BLD: 1 % (ref 0–2)
BENZODIAZ UR QL: NEGATIVE
BILIRUB SERPL-MCNC: 0.3 MG/DL (ref 0.2–1)
BILIRUB UR QL: NEGATIVE
BLOOD BANK CMNT PATIENT-IMP: NORMAL
BLOOD GROUP ANTIBODIES SERPL: NORMAL
BUN SERPL-MCNC: 15 MG/DL (ref 7–18)
BUN/CREAT SERPL: 21 (ref 12–20)
CALCIUM SERPL-MCNC: 8.8 MG/DL (ref 8.5–10.1)
CANNABINOIDS UR QL SCN: POSITIVE
CHLORIDE SERPL-SCNC: 106 MMOL/L (ref 100–111)
CO2 SERPL-SCNC: 27 MMOL/L (ref 21–32)
COCAINE UR QL SCN: POSITIVE
COLOR UR: YELLOW
CREAT SERPL-MCNC: 0.7 MG/DL (ref 0.6–1.3)
DIFFERENTIAL METHOD BLD: ABNORMAL
EOSINOPHIL # BLD: 0.3 K/UL (ref 0–0.4)
EOSINOPHIL NFR BLD: 3 % (ref 0–5)
EPITH CASTS URNS QL MICRO: NORMAL /LPF (ref 0–5)
ERYTHROCYTE [DISTWIDTH] IN BLOOD BY AUTOMATED COUNT: 11.9 % (ref 11.6–14.5)
GLOBULIN SER CALC-MCNC: 2.7 G/DL (ref 2–4)
GLUCOSE SERPL-MCNC: 105 MG/DL (ref 74–99)
GLUCOSE UR STRIP.AUTO-MCNC: NEGATIVE MG/DL
HCG SERPL-ACNC: 1599 MIU/ML (ref 0–10)
HCG UR QL: POSITIVE
HCT VFR BLD AUTO: 37.8 % (ref 35–45)
HDSCOM,HDSCOM: ABNORMAL
HGB BLD-MCNC: 12.9 G/DL (ref 12–16)
HGB UR QL STRIP: NEGATIVE
IMM GRANULOCYTES # BLD AUTO: 0 K/UL (ref 0–0.04)
IMM GRANULOCYTES NFR BLD AUTO: 0 % (ref 0–0.5)
KETONES UR QL STRIP.AUTO: NEGATIVE MG/DL
LEUKOCYTE ESTERASE UR QL STRIP.AUTO: ABNORMAL
LIPASE SERPL-CCNC: 154 U/L (ref 73–393)
LYMPHOCYTES # BLD: 2.3 K/UL (ref 0.9–3.6)
LYMPHOCYTES NFR BLD: 27 % (ref 21–52)
MCH RBC QN AUTO: 32.9 PG (ref 24–34)
MCHC RBC AUTO-ENTMCNC: 34.1 G/DL (ref 31–37)
MCV RBC AUTO: 96.4 FL (ref 78–100)
METHADONE UR QL: NEGATIVE
MONOCYTES # BLD: 0.9 K/UL (ref 0.05–1.2)
MONOCYTES NFR BLD: 10 % (ref 3–10)
NEUTS SEG # BLD: 5.1 K/UL (ref 1.8–8)
NEUTS SEG NFR BLD: 59 % (ref 40–73)
NITRITE UR QL STRIP.AUTO: NEGATIVE
NRBC # BLD: 0 K/UL (ref 0–0.01)
NRBC BLD-RTO: 0 PER 100 WBC
OPIATES UR QL: NEGATIVE
PCP UR QL: NEGATIVE
PH UR STRIP: 7 [PH] (ref 5–8)
PLATELET # BLD AUTO: 221 K/UL (ref 135–420)
PMV BLD AUTO: 10.3 FL (ref 9.2–11.8)
POTASSIUM SERPL-SCNC: 3.6 MMOL/L (ref 3.5–5.5)
PROT SERPL-MCNC: 6.2 G/DL (ref 6.4–8.2)
PROT UR STRIP-MCNC: ABNORMAL MG/DL
RBC # BLD AUTO: 3.92 M/UL (ref 4.2–5.3)
SODIUM SERPL-SCNC: 138 MMOL/L (ref 136–145)
SP GR UR REFRACTOMETRY: 1.02 (ref 1–1.03)
SPECIMEN EXP DATE BLD: NORMAL
UROBILINOGEN UR QL STRIP.AUTO: 2 EU/DL (ref 0.2–1)
WBC # BLD AUTO: 8.7 K/UL (ref 4.6–13.2)
WBC URNS QL MICRO: NORMAL /HPF (ref 0–4)

## 2022-08-17 PROCEDURE — 86900 BLOOD TYPING SEROLOGIC ABO: CPT

## 2022-08-17 PROCEDURE — 96361 HYDRATE IV INFUSION ADD-ON: CPT

## 2022-08-17 PROCEDURE — 74011250637 HC RX REV CODE- 250/637: Performed by: PHYSICIAN ASSISTANT

## 2022-08-17 PROCEDURE — 81025 URINE PREGNANCY TEST: CPT

## 2022-08-17 PROCEDURE — 81001 URINALYSIS AUTO W/SCOPE: CPT

## 2022-08-17 PROCEDURE — 96374 THER/PROPH/DIAG INJ IV PUSH: CPT

## 2022-08-17 PROCEDURE — 76817 TRANSVAGINAL US OBSTETRIC: CPT

## 2022-08-17 PROCEDURE — 74011250636 HC RX REV CODE- 250/636: Performed by: PHYSICIAN ASSISTANT

## 2022-08-17 PROCEDURE — 84702 CHORIONIC GONADOTROPIN TEST: CPT

## 2022-08-17 PROCEDURE — 85025 COMPLETE CBC W/AUTO DIFF WBC: CPT

## 2022-08-17 PROCEDURE — 99284 EMERGENCY DEPT VISIT MOD MDM: CPT

## 2022-08-17 PROCEDURE — 80053 COMPREHEN METABOLIC PANEL: CPT

## 2022-08-17 PROCEDURE — 83690 ASSAY OF LIPASE: CPT

## 2022-08-17 PROCEDURE — 80307 DRUG TEST PRSMV CHEM ANLYZR: CPT

## 2022-08-17 RX ORDER — ONDANSETRON 8 MG/1
4 TABLET, ORALLY DISINTEGRATING ORAL
Qty: 12 TABLET | Refills: 0 | Status: SHIPPED | OUTPATIENT
Start: 2022-08-17

## 2022-08-17 RX ORDER — CEPHALEXIN 500 MG/1
500 CAPSULE ORAL 3 TIMES DAILY
Qty: 15 CAPSULE | Refills: 0 | Status: SHIPPED | OUTPATIENT
Start: 2022-08-17 | End: 2022-08-24

## 2022-08-17 RX ORDER — CEPHALEXIN 250 MG/1
500 CAPSULE ORAL
Status: COMPLETED | OUTPATIENT
Start: 2022-08-17 | End: 2022-08-17

## 2022-08-17 RX ORDER — ONDANSETRON 2 MG/ML
4 INJECTION INTRAMUSCULAR; INTRAVENOUS ONCE
Status: COMPLETED | OUTPATIENT
Start: 2022-08-17 | End: 2022-08-17

## 2022-08-17 RX ADMIN — SODIUM CHLORIDE 1000 ML: 900 INJECTION, SOLUTION INTRAVENOUS at 17:19

## 2022-08-17 RX ADMIN — ONDANSETRON 4 MG: 2 INJECTION INTRAMUSCULAR; INTRAVENOUS at 17:19

## 2022-08-17 RX ADMIN — CEPHALEXIN 500 MG: 250 CAPSULE ORAL at 20:28

## 2022-08-17 NOTE — ED PROVIDER NOTES
EMERGENCY DEPARTMENT HISTORY & PHYSICAL EXAM    [facility] EMERGENCY DEPT  2022, 3:39 PM    Clinical Impression:  1. , threatened, early pregnancy    2. Cocaine use    3. Marijuana use    4. Urinary tract infection with hematuria, site unspecified        Assessment/Differential Diagnosis:     Ddx pregnancy, abnormal pregnancy, ectopic, threatened miscarriage, UTI, drug use, intra-abdominal pathology all considered    ED Course:   Initial assessment performed. The patients presenting problems have been discussed, and they are in agreement with the care plan formulated and outlined with them. I have encouraged them to ask questions as they arise throughout their visit. Comes to ED states feeling fatigued and \"pregnant\". Last menstrual cycle was . 2 home pregnancy test positive today. States when she wipes after urinating there is some slight pink coloration. No shravan bleeding. No abdominal cramping. Exam with patient no acute distress. Nontoxic appearing. Exam with benign abdominal exam.   Will check labs, urine, give a liter of fluid and Zofran as patient complaining of feeling nauseous  Review of her records reveal that she is O-    Workup with possible UTI, keflex will be given  + cocaine, + marijuana  Zofran and benadryl as needed for nausea  bHCG 1600  H/h stable  US with no obvious pregnancy in uterus, no adnexal mass. Discussed with Dr. Cristine Feldman, ED attending. Will give rhogam this evening, and have pt follow up with OB/GYN 2 days, or return here to the ED, sooner if abd pain, heavy bleeding           Medical Chart Review:  I have reviewed triage nursing documentation. Review of old medical records with the following pertinent information:       Disposition:  Home  in good condition. Chief Complaint   Patient presents with    Fatigue    Pregnancy Problem     HPI:    The history is provided by patient. No  used.     Richie Craig Ekta Israel is a 33 y.o. female presenting to the Emergency Department with complaints of feeling pregnant with 2 home pregnancy tests. Patient states for the past couple days she has felt fatigued and nauseous. This is reminiscent of when she was pregnant approximately 11 years ago. She took 2 home pregnancy tests that were positive so came to the ED for further evaluation. She is G4, P1 with 3 first trimester miscarriages. No history of ectopic pregnancy. No recent pregnancy. Denies fever, chills, flulike illness, shortness of breath or chest pain. She has been nauseous for the last few days without vomiting. She does have some low abdominal pain intermittently. She denies cramping pain. Bowel movements are normal.  Urinating without symptoms. Patient states when she does wipe after urination there is some pink hue with wiping. No shravan bleeding. No Vaginal discharge. Takes no chronic medications. He denies smoking cigarettes, has not drank alcohol in several months, does admit to smoking marijuana daily. When asked if she does any other drugs she states no. When asked about cocaine she states that its been [place] while\" and when probed for a timeframe she says 3 days ago. I have reviewed all PMHX, FMHX and Social Hx as entered into the medical record in the chart below using the Epic Template. Review of Systems:  Constitutional:  Neg for fever,  chills, weight changes. ENT:  Neg for Sore throat, runny nose, or other URI symptoms  Respiratory:  neg for cough, no shortness of breath, or wheezing  Cardiovascular:  No chest pain, chest pressure, palpitations. GI:  no vomiting, nodiarrhea, + low intermittent abdominal pain. : no dysuria, no frequency, nourgency. No Flank pain. MSK no joint pain, no myalgias  Integumentary: no rashes, lesions, or skin irritation. Neurological: no headaches, dizziness  All other systems reviewed negative except was is positive in ROS and HPI.     Past Medical History:  Past Medical History:   Diagnosis Date    [facility] disorder[facility] depressed [facility] [address]     Cocaine abuse ([facility] [address]     Neurological disorder     Pancreatitis     Psychiatric disorder        Past Surgical History:  Past Surgical History:   Procedure Laterality Date    HX PELVIC LAPAROSCOPY      for perforated IUD removal    UPPER GI ENDOSCOPY,BIOPSY  3/29/2017            Family History:  No family history on file. Social History:  Social History     Tobacco Use    Smoking status: Never    Smokeless tobacco: Never   Substance Use Topics    Alcohol use: Yes     Alcohol/week: 0.8 standard drinks     Types: 1 Standard drinks or equivalent per week     Comment: 5-6 shots/week    Drug use: Yes     Frequency: 7.0 times per week     Types: Marijuana       Allergies: Allergies   Allergen Reactions    Latex, Natural Rubber Rash    Doxycycline Hives       Vital Signs:  Vitals:    08/17/22 1520   BP: 113/62   Pulse: 79   Resp: 18   Temp: 97.9 °F (36.6 °C)   SpO2: 100%   Weight: 52.2 kg (115 lb)   Height: 4' 8\" (1.422 m)     Physical Exam:  Vital Signs Reviewed. Nursing Notes Reviewed. Constitutional:  Well developed, well nourished patient. Appearance and behavior are age and situation appropriate. Head: Normocephalic, Atraumatic  Eyes: Conjunctiva clear, lids normal. Sclera anicteric. PERRL.  ENT:  gross hearing normal, throat normal   Neck:  Supple, FROM. Trachea midline. No nuchal rigidity. Lungs: Lungs CTAB. No wheezes, rales or rhonchi. No respiratory distress, tachypnea or accessory muscle use. No cough. Cardiac:  RRR without murmur. No peripheral edema  Abdomen: soft, nontender, normal  bowel sounds, no mass. Skin without rash or signs of trauma. Extremities:  no pedal edema  Neuro:  A&O , no obvious neuro deficit with general inspection.   Skin:  Warm, dry, no rash  Back:  No CVAT    Diagnostics:    Labs -     Recent Results (from the past 12 hour(s))   URINALYSIS W/ RFLX MICROSCOPIC    Collection Time: 08/17/22  4:57 PM   Result Value Ref Range    Color YELLOW      Appearance TURBID      Specific gravity 1.024 1.005 - 1.030      pH (UA) 7.0 5.0 - 8.0      Protein TRACE (A) NEG mg/dL    Glucose Negative NEG mg/dL    Ketone Negative NEG mg/dL    Bilirubin Negative NEG      Blood Negative NEG      Urobilinogen 2.0 (H) 0.2 - 1.0 EU/dL    Nitrites Negative NEG      Leukocyte Esterase TRACE (A) NEG     HCG URINE, QL    Collection Time: 08/17/22  4:57 PM   Result Value Ref Range    HCG urine, QL Positive (A) NEG     DRUG SCREEN, URINE    Collection Time: 08/17/22  4:57 PM   Result Value Ref Range    BENZODIAZEPINES Negative NEG      BARBITURATES Negative NEG      THC (TH-CANNABINOL) Positive (A) NEG      OPIATES Negative NEG      PCP(PHENCYCLIDINE) Negative NEG      COCAINE Positive (A) NEG      AMPHETAMINES Negative NEG      METHADONE Negative NEG      HDSCOM (NOTE)    CBC WITH AUTOMATED DIFF    Collection Time: 08/17/22  4:57 PM   Result Value Ref Range    WBC 8.7 4.6 - 13.2 K/uL    RBC 3.92 (L) 4.20 - 5.30 M/uL    HGB 12.9 12.0 - 16.0 g/dL    HCT 37.8 35.0 - 45.0 %    MCV 96.4 78.0 - 100.0 FL    MCH 32.9 24.0 - 34.0 PG    MCHC 34.1 31.0 - 37.0 g/dL    RDW 11.9 11.6 - 14.5 %    PLATELET 364 579 - 500 K/uL    MPV 10.3 9.2 - 11.8 FL    NRBC 0.0 0  WBC    ABSOLUTE NRBC 0.00 0.00 - 0.01 K/uL    NEUTROPHILS 59 40 - 73 %    LYMPHOCYTES 27 21 - 52 %    MONOCYTES 10 3 - 10 %    EOSINOPHILS 3 0 - 5 %    BASOPHILS 1 0 - 2 %    IMMATURE GRANULOCYTES 0 0.0 - 0.5 %    ABS. NEUTROPHILS 5.1 1.8 - 8.0 K/UL    ABS. LYMPHOCYTES 2.3 0.9 - 3.6 K/UL    ABS. MONOCYTES 0.9 0.05 - 1.2 K/UL    ABS. EOSINOPHILS 0.3 0.0 - 0.4 K/UL    ABS. BASOPHILS 0.0 0.0 - 0.1 K/UL    ABS. IMM.  GRANS. 0.0 0.00 - 0.04 K/UL    DF AUTOMATED     METABOLIC PANEL, COMPREHENSIVE    Collection Time: 08/17/22  4:57 PM   Result Value Ref Range    Sodium 138 136 - 145 mmol/L    Potassium 3.6 3.5 - 5.5 mmol/L    Chloride 106 100 - 111 mmol/L    CO2 27 21 - 32 mmol/L    Anion gap 5 3.0 - 18 mmol/L    Glucose 105 (H) 74 - 99 mg/dL    BUN 15 7.0 - 18 MG/DL    Creatinine 0.70 0.6 - 1.3 MG/DL    BUN/Creatinine ratio 21 (H) 12 - 20      GFR est AA >60 >60 ml/min/1.73m2    GFR est non-AA >60 >60 ml/min/1.73m2    Calcium 8.8 8.5 - 10.1 MG/DL    Bilirubin, total 0.3 0.2 - 1.0 MG/DL    ALT (SGPT) 20 13 - 56 U/L    AST (SGOT) 17 10 - 38 U/L    Alk. phosphatase 70 45 - 117 U/L    Protein, total 6.2 (L) 6.4 - 8.2 g/dL    Albumin 3.5 3.4 - 5.0 g/dL    Globulin 2.7 2.0 - 4.0 g/dL    A-G Ratio 1.3 0.8 - 1.7     LIPASE    Collection Time: 08/17/22  4:57 PM   Result Value Ref Range    Lipase 154 73 - 393 U/L   BETA HCG, QT    Collection Time: 08/17/22  4:57 PM   Result Value Ref Range    Beta HCG, QT 1,599 (H) 0 - 10 MIU/ML   URINE MICROSCOPIC ONLY    Collection Time: 08/17/22  4:57 PM   Result Value Ref Range    WBC 0 to 3 0 - 4 /hpf    Epithelial cells 2+ 0 - 5 /lpf       Radiologic Studies -   US OB TV W DOPPLER   Final Result      There is no intrauterine gestation demonstrated. Pregnancy of unknown location. Differential diagnosis of early intrauterine gestation less than 4-5 weeks,   recent miscarriage, and ectopic pregnancy. Recommend follow-up in 2 days with   correlation of quantitative beta hCG as clinically indicated. Hemorrhagic cyst right ovary measuring 8mm. CT Results  (Last 48 hours)      None          CXR Results  (Last 48 hours)      None            Medications given in the ED-  Medications   rho D immune globulin (RHOGAM) 1,500 unit (300 mcg) injection 0.3 mg (has no administration in time range)   cephALEXin (KEFLEX) capsule 500 mg (has no administration in time range)   sodium chloride 0.9 % bolus infusion 1,000 mL (0 mL IntraVENous IV Completed 8/17/22 1828)   ondansetron (ZOFRAN) injection 4 mg (4 mg IntraVENous Given 8/17/22 1719)       Please note that this dictation was completed with [employer], the computer voice recognition software.   Quite often unanticipated grammatical, syntax, homophones, and other interpretive errors are inadvertently transcribed by the computer software. Please disregard these errors. Please excuse any errors that have escaped final proofreading.

## 2022-08-17 NOTE — ED TRIAGE NOTES
Pt presents with c/o weakness/fatigue for approx 1 week. Took two home pregnancy tests which were positive. Stated she has been nauseous without vomiting. Also reports spotting when she wipes. Admits to smoking marijuana frequently & denies other drug use. Appears to be under the influence of an unknown substance in triage.

## 2022-08-18 VITALS
HEIGHT: 56 IN | DIASTOLIC BLOOD PRESSURE: 66 MMHG | RESPIRATION RATE: 18 BRPM | OXYGEN SATURATION: 100 % | TEMPERATURE: 97.9 F | SYSTOLIC BLOOD PRESSURE: 110 MMHG | HEART RATE: 72 BPM | BODY MASS INDEX: 25.87 KG/M2 | WEIGHT: 115 LBS

## 2022-08-18 LAB
BLD PROD TYP BPU: NORMAL
BPU ID: NORMAL
STATUS OF UNIT,%ST: NORMAL
UNIT DIVISION, %UDIV: 0

## 2022-08-18 PROCEDURE — 74011250636 HC RX REV CODE- 250/636: Performed by: PHYSICIAN ASSISTANT

## 2022-08-18 PROCEDURE — 96372 THER/PROPH/DIAG INJ SC/IM: CPT

## 2022-08-18 RX ADMIN — HUMAN RHO(D) IMMUNE GLOBULIN 0.3 MG: 300 INJECTION, SOLUTION INTRAMUSCULAR at 00:11

## 2022-08-18 NOTE — DISCHARGE INSTRUCTIONS
Well-hydrated  Healthy diet  Avoid any street drugs including cocaine and marijuana as this can be harmful to your baby  Prenatal vitamin daily  Urine appears infected, take antibiotic as directed  Zofran or Benadryl as needed for nausea  It is very importantly follow-up in 2 days for reevaluation, blood work and possible ultrasound. If you cannot be seen by a gynecologist in the area you must return to the ER for reevaluation  RhoGAM was ordered this evening because you have O- blood  Your work-up today shows positive pregnancy blood test but no obvious pregnancy in your uterus. This could be due to with early healthy pregnancy, miscarriage or ectopic pregnancy.   For this reason you need to follow-up in 2 days  Return to ER sooner if you have fever, severe abdominal pain or heavy bleeding

## 2022-08-18 NOTE — PROGRESS NOTES
Patient pending Rhogam, labs drawn and sent, awaiting cross match and delivery of medication from [facility].

## 2022-08-19 ENCOUNTER — HOSPITAL ENCOUNTER (EMERGENCY)
Age: 32
Discharge: HOME | End: 2022-08-19
Attending: STUDENT IN AN ORGANIZED HEALTH CARE EDUCATION/TRAINING PROGRAM
Payer: MEDICAID

## 2022-08-19 VITALS
HEART RATE: 97 BPM | RESPIRATION RATE: 17 BRPM | WEIGHT: 115 LBS | OXYGEN SATURATION: 100 % | DIASTOLIC BLOOD PRESSURE: 71 MMHG | BODY MASS INDEX: 25.87 KG/M2 | TEMPERATURE: 97.6 F | SYSTOLIC BLOOD PRESSURE: 101 MMHG | HEIGHT: 56 IN

## 2022-08-19 DIAGNOSIS — F14.90 COCAINE USE: ICD-10-CM

## 2022-08-19 DIAGNOSIS — N30.00 ACUTE CYSTITIS WITHOUT HEMATURIA: ICD-10-CM

## 2022-08-19 DIAGNOSIS — Z32.01 PREGNANCY TEST PERFORMED, PREGNANCY CONFIRMED: Primary | ICD-10-CM

## 2022-08-19 DIAGNOSIS — F12.90 MARIJUANA USE: ICD-10-CM

## 2022-08-19 LAB
AMPHET UR QL SCN: NEGATIVE
APPEARANCE UR: ABNORMAL
BARBITURATES UR QL SCN: NEGATIVE
BENZODIAZ UR QL: NEGATIVE
BILIRUB UR QL: NEGATIVE
CANNABINOIDS UR QL SCN: POSITIVE
COCAINE UR QL SCN: POSITIVE
COLOR UR: YELLOW
EPITH CASTS URNS QL MICRO: NORMAL /LPF (ref 0–5)
GLUCOSE UR STRIP.AUTO-MCNC: NEGATIVE MG/DL
HCG SERPL QL: POSITIVE
HCG SERPL-ACNC: 2544 MIU/ML (ref 0–10)
HDSCOM,HDSCOM: ABNORMAL
HGB UR QL STRIP: NEGATIVE
KETONES UR QL STRIP.AUTO: NEGATIVE MG/DL
LEUKOCYTE ESTERASE UR QL STRIP.AUTO: ABNORMAL
METHADONE UR QL: NEGATIVE
NITRITE UR QL STRIP.AUTO: NEGATIVE
OPIATES UR QL: NEGATIVE
PCP UR QL: NEGATIVE
PH UR STRIP: 6.5 [PH] (ref 5–8)
PROT UR STRIP-MCNC: NEGATIVE MG/DL
SP GR UR REFRACTOMETRY: 1.02 (ref 1–1.03)
UROBILINOGEN UR QL STRIP.AUTO: 1 EU/DL (ref 0.2–1)
WBC URNS QL MICRO: NORMAL /HPF (ref 0–4)

## 2022-08-19 PROCEDURE — 87661 TRICHOMONAS VAGINALIS AMPLIF: CPT

## 2022-08-19 PROCEDURE — 87086 URINE CULTURE/COLONY COUNT: CPT

## 2022-08-19 PROCEDURE — 74011250637 HC RX REV CODE- 250/637

## 2022-08-19 PROCEDURE — 84702 CHORIONIC GONADOTROPIN TEST: CPT

## 2022-08-19 PROCEDURE — 80307 DRUG TEST PRSMV CHEM ANLYZR: CPT

## 2022-08-19 PROCEDURE — 81001 URINALYSIS AUTO W/SCOPE: CPT

## 2022-08-19 PROCEDURE — 99283 EMERGENCY DEPT VISIT LOW MDM: CPT

## 2022-08-19 PROCEDURE — 84703 CHORIONIC GONADOTROPIN ASSAY: CPT

## 2022-08-19 RX ORDER — CEPHALEXIN 250 MG/1
500 CAPSULE ORAL
Status: COMPLETED | OUTPATIENT
Start: 2022-08-19 | End: 2022-08-19

## 2022-08-19 RX ORDER — B-COMPLEX WITH VITAMIN C
1 TABLET ORAL DAILY
Qty: 90 TABLET | Refills: 0 | Status: SHIPPED | OUTPATIENT
Start: 2022-08-19

## 2022-08-19 RX ADMIN — CEPHALEXIN 500 MG: 250 CAPSULE ORAL at 13:14

## 2022-08-19 NOTE — ED PROVIDER NOTES
EMERGENCY DEPARTMENT HISTORY AND PHYSICAL EXAM      Date: 8/19/2022  Patient Name: Yovani Adams      History of Presenting Illness     Chief Complaint   Patient presents with    Follow Up Appointment    Labs       History Provided By: Patient    Location/Duration/Severity/Modifying factors   HPI    Pt with PMHx of polysubstance abuse here for follow up lab. Pt seen on 8/17 with cc of positive pregnancy test and hematuria. PT Hcg urine and serum was positive, ultrasound performed and did not show any uterine pregnancy or adnexal mass. UA positive for UTI. UDS positive for cocaine and THC. Given rhogam and keflex. Told to follow up with OBGYN for repeat Hcg levels in 48 hours. Pt unable to get apt with OB so came to ED. Pt reports suprapubic pain, feeling \"sweaty\". Denies headache, nausea, vomiting, chest pain, shortness of breath, dysuria, hematuria, vaginal irritation, vaginal bleeding, back pain, muscle/joint pain. Has not picked up antibiotics yet. There are no other complaints, changes, or physical findings at this time. PCP: Herminia Wood MD    Current Outpatient Medications   Medication Sig Dispense Refill    prenatal vit no.130-iron-folic (Prenatal Vitamin) 27 mg iron- 800 mcg tab tablet Take 1 Tablet by mouth daily. 90 Tablet 0    cephALEXin (Keflex) 500 mg capsule Take 1 Capsule by mouth three (3) times daily for 7 days. 15 Capsule 0    ondansetron (ZOFRAN ODT) 8 mg disintegrating tablet Take 0.5 Tablets by mouth every eight (8) hours as needed for Nausea for up to 12 doses. 12 Tablet 0    ibuprofen (MOTRIN) 600 mg tablet Take 1 Tablet by mouth every six (6) hours as needed for Pain.  20 Tablet 0       Past History     Past Medical History:  Past Medical History:   Diagnosis Date    Bipolar 1 disorder, depressed ([facility] [address]     Cocaine abuse ([facility] [address]     Neurological disorder     Pancreatitis     Psychiatric disorder        Past Surgical History:  Past Surgical History:   Procedure Laterality Date    HX PELVIC LAPAROSCOPY      for perforated IUD removal    UPPER GI ENDOSCOPY,BIOPSY  3/29/2017            Family History:  No family history on file. Social History:  Social History     Tobacco Use    Smoking status: Never    Smokeless tobacco: Never   Substance Use Topics    Alcohol use: Yes     Alcohol/week: 0.8 standard drinks     Types: 1 Standard drinks or equivalent per week     Comment: 5-6 shots/week    Drug use: Yes     Frequency: 7.0 times per week     Types: Marijuana       Allergies: Allergies   Allergen Reactions    Latex, Natural Rubber Rash    Doxycycline Hives         Review of Systems     Review of Systems   Constitutional:  Positive for fever. Negative for appetite change and chills. HENT:  Negative for congestion, rhinorrhea and sore throat. Respiratory:  Negative for shortness of breath. Cardiovascular:  Negative for chest pain, palpitations and leg swelling. Gastrointestinal:  Positive for abdominal pain. Negative for constipation, diarrhea, nausea and vomiting. Genitourinary:  Negative for dysuria, flank pain, genital sores, hematuria, vaginal bleeding, vaginal discharge and vaginal pain. Musculoskeletal:  Negative for arthralgias and myalgias. Skin:  Negative for rash and wound. Allergic/Immunologic: Negative for immunocompromised state. Neurological:  Negative for syncope and headaches. Hematological:  Does not bruise/bleed easily. Psychiatric/Behavioral:  Negative for suicidal ideas. Physical Exam     Physical Exam  Constitutional:       General: She is not in acute distress. Appearance: Normal appearance. She is not diaphoretic. HENT:      Head: Normocephalic and atraumatic. Right Ear: External ear normal.      Left Ear: External ear normal.      Nose: Nose normal.      Mouth/Throat:      Mouth: Mucous membranes are moist.   Eyes:      Extraocular Movements: Extraocular movements intact.       Conjunctiva/sclera: Conjunctivae normal.      Pupils: Pupils are equal, round, and reactive to light. Cardiovascular:      Rate and Rhythm: Normal rate and regular rhythm. Pulmonary:      Effort: Pulmonary effort is normal.      Breath sounds: Normal breath sounds. Abdominal:      Palpations: Abdomen is soft. Tenderness: There is abdominal tenderness (suprapubic tenderness). There is right CVA tenderness and left CVA tenderness. Musculoskeletal:         General: No swelling. Normal range of motion. Cervical back: Normal range of motion and neck supple. Skin:     General: Skin is warm and dry. Neurological:      General: No focal deficit present. Mental Status: She is alert and oriented to person, place, and time.    Psychiatric:         Behavior: Behavior normal.       Lab and Diagnostic Study Results     Labs -  Recent Results (from the past 24 hour(s))   HCG QL SERUM    Collection Time: 08/19/22 11:25 AM   Result Value Ref Range    HCG, Ql. Positive (A) NEG     BETA HCG, QT    Collection Time: 08/19/22 11:25 AM   Result Value Ref Range    Beta HCG, QT 2,544 (H) 0 - 10 MIU/ML   DRUG SCREEN, URINE    Collection Time: 08/19/22 12:10 PM   Result Value Ref Range    BENZODIAZEPINES Negative NEG      BARBITURATES Negative NEG      THC (TH-CANNABINOL) Positive (A) NEG      OPIATES Negative NEG      PCP(PHENCYCLIDINE) Negative NEG      COCAINE Positive (A) NEG      AMPHETAMINES Negative NEG      METHADONE Negative NEG      HDSCOM (NOTE)    URINALYSIS W/ RFLX MICROSCOPIC    Collection Time: 08/19/22 12:10 PM   Result Value Ref Range    Color YELLOW      Appearance CLOUDY      Specific gravity 1.017 1.005 - 1.030      pH (UA) 6.5 5.0 - 8.0      Protein Negative NEG mg/dL    Glucose Negative NEG mg/dL    Ketone Negative NEG mg/dL    Bilirubin Negative NEG      Blood Negative NEG      Urobilinogen 1.0 0.2 - 1.0 EU/dL    Nitrites Negative NEG      Leukocyte Esterase TRACE (A) NEG     URINE MICROSCOPIC ONLY    Collection Time: 08/19/22 12:10 PM   Result Value Ref Range    WBC 0 to 3 0 - 4 /hpf    Epithelial cells 2+ 0 - 5 /lpf         Radiologic Studies -   No orders to display         Medical Decision Making and ED Course       - I reviewed the vital signs, available nursing notes, past medical history, past surgical history, family history and social history. - Initial assessment performed. The patients presenting problems have been discussed, and the staff are in agreement with the care plan formulated and outlined with them. I have encouraged them to ask questions as they arise throughout their visit. Vital Signs-Reviewed the patient's vital signs. Patient Vitals for the past 12 hrs:   Temp Pulse Resp BP SpO2   08/19/22 1047 97.6 °F (36.4 °C) 97 17 101/71 100 %       EKG interpretation:     Records Reviewed: Nursing Notes, Old Medical Records, Previous electrocardiograms, Previous Radiology Studies, and Previous Laboratory Studies        Provider Notes (Medical Decision Making):     MDM  Number of Diagnoses or Management Options     Pt diagnosed with UTI on 8/17. Has not been taking antibiotics. UA remains positive for leuk esterase. VS stable in ED. STI urine testing pending. Hcg levels repeated, increasing appropriately. Will defer ultrasound today given suspected early gestation. VS wnl and stable. UDS remains positive for cocaine and THC. ED Course:   Given 1x dose of keflex while in ED. Counseled on importance of substance use cessation in setting of pregnancy as well as treating infections promptly in setting of pregnancy. Pt states she is not established with OBGYN yet.  Will give information for referral in dispo.        ------------------------------------------------------------------------------------------------------------        Consultations:       Consultations: - NONE        Procedures and Critical Care               Disposition       Discharged home  Discharged      Diagnosis Clinical Impression:   1. Pregnancy test performed, pregnancy confirmed    2. Acute cystitis without hematuria    3. Cocaine use    4.  Marijuana use        Attestations:    Dina Mensah MD, PGY2  [facility] Medicine

## 2022-08-19 NOTE — ED TRIAGE NOTES
Pt arrived to Ed for repeat labs and possible repeat US. Pt was seen on 8/17 and given Rhogam IM. Pt unable to get in with OB. Pt denies vaginal bleeding since receiving injection.  LMP 7/16/2022

## 2022-08-20 LAB
BACTERIA SPEC CULT: NORMAL
SERVICE CMNT-IMP: NORMAL

## 2022-08-22 LAB
C TRACH RRNA SPEC QL NAA+PROBE: NEGATIVE
N GONORRHOEA RRNA SPEC QL NAA+PROBE: NEGATIVE
SPECIMEN SOURCE: ABNORMAL
T VAGINALIS RRNA SPEC QL NAA+PROBE: POSITIVE

## 2022-09-25 NOTE — PROGRESS NOTES
Have left a HIPAA compliant voicemail for the patient to return our call to discuss her positive trichomonas

## 2023-03-24 ENCOUNTER — HOSPITAL ENCOUNTER (EMERGENCY)
Facility: HOSPITAL | Age: 33
Discharge: HOME | End: 2023-03-24
Attending: EMERGENCY MEDICINE
Payer: MEDICAID

## 2023-03-24 ENCOUNTER — APPOINTMENT (OUTPATIENT)
Facility: HOSPITAL | Age: 33
End: 2023-03-24
Payer: MEDICAID

## 2023-03-24 VITALS
OXYGEN SATURATION: 98 % | HEART RATE: 74 BPM | SYSTOLIC BLOOD PRESSURE: 98 MMHG | RESPIRATION RATE: 16 BRPM | WEIGHT: 90 LBS | DIASTOLIC BLOOD PRESSURE: 74 MMHG | HEIGHT: 56 IN | TEMPERATURE: 98.3 F | BODY MASS INDEX: 20.24 KG/M2

## 2023-03-24 DIAGNOSIS — N83.201 CYST OF RIGHT OVARY: ICD-10-CM

## 2023-03-24 DIAGNOSIS — R11.2 NAUSEA AND VOMITING, UNSPECIFIED VOMITING TYPE: ICD-10-CM

## 2023-03-24 DIAGNOSIS — R10.84 GENERALIZED ABDOMINAL PAIN: Primary | ICD-10-CM

## 2023-03-24 LAB
ALBUMIN SERPL-MCNC: 3.7 G/DL (ref 3.4–5)
ALBUMIN/GLOB SERPL: 0.9 (ref 0.8–1.7)
ALP SERPL-CCNC: 96 U/L (ref 45–117)
ALT SERPL-CCNC: 30 U/L (ref 13–56)
ANION GAP SERPL CALC-SCNC: 4 MMOL/L (ref 3–18)
APPEARANCE UR: ABNORMAL
AST SERPL-CCNC: 21 U/L (ref 10–38)
BACTERIA URNS QL MICRO: ABNORMAL /HPF
BILIRUB SERPL-MCNC: 0.3 MG/DL (ref 0.2–1)
BILIRUB UR QL: NEGATIVE
BUN SERPL-MCNC: 17 MG/DL (ref 7–18)
BUN/CREAT SERPL: 27 (ref 12–20)
CALCIUM SERPL-MCNC: 8.6 MG/DL (ref 8.5–10.1)
CHLORIDE SERPL-SCNC: 109 MMOL/L (ref 100–111)
CO2 SERPL-SCNC: 26 MMOL/L (ref 21–32)
COLOR UR: YELLOW
CREAT SERPL-MCNC: 0.62 MG/DL (ref 0.6–1.3)
EKG ATRIAL RATE: 76 BPM
EKG DIAGNOSIS: NORMAL
EKG P AXIS: 63 DEGREES
EKG P-R INTERVAL: 148 MS
EKG Q-T INTERVAL: 366 MS
EKG QRS DURATION: 86 MS
EKG QTC CALCULATION (BAZETT): 411 MS
EKG R AXIS: 73 DEGREES
EKG T AXIS: 69 DEGREES
EKG VENTRICULAR RATE: 76 BPM
EPITH CASTS URNS QL MICRO: ABNORMAL /LPF (ref 0–5)
ERYTHROCYTE [DISTWIDTH] IN BLOOD BY AUTOMATED COUNT: 11.9 % (ref 11.6–14.5)
GLOBULIN SER CALC-MCNC: 4 G/DL (ref 2–4)
GLUCOSE SERPL-MCNC: 87 MG/DL (ref 74–99)
GLUCOSE UR STRIP.AUTO-MCNC: NEGATIVE MG/DL
HCG UR QL: NEGATIVE
HCT VFR BLD AUTO: 40.5 % (ref 35–45)
HGB BLD-MCNC: 14 G/DL (ref 12–16)
HGB UR QL STRIP: ABNORMAL
KETONES UR QL STRIP.AUTO: NEGATIVE MG/DL
LEUKOCYTE ESTERASE UR QL STRIP.AUTO: ABNORMAL
LIPASE SERPL-CCNC: 151 U/L (ref 73–393)
MCH RBC QN AUTO: 32.8 PG (ref 24–34)
MCHC RBC AUTO-ENTMCNC: 34.6 G/DL (ref 31–37)
MCV RBC AUTO: 94.8 FL (ref 78–100)
NITRITE UR QL STRIP.AUTO: NEGATIVE
NRBC # BLD: 0 K/UL (ref 0–0.01)
NRBC BLD-RTO: 0 PER 100 WBC
PH UR STRIP: 5.5 (ref 5–8)
PLATELET # BLD AUTO: 241 K/UL (ref 135–420)
PMV BLD AUTO: 9.7 FL (ref 9.2–11.8)
POTASSIUM SERPL-SCNC: 3.8 MMOL/L (ref 3.5–5.5)
PROT SERPL-MCNC: 7.7 G/DL (ref 6.4–8.2)
PROT UR STRIP-MCNC: NEGATIVE MG/DL
RBC # BLD AUTO: 4.27 M/UL (ref 4.2–5.3)
RBC #/AREA URNS HPF: ABNORMAL /HPF (ref 0–5)
SODIUM SERPL-SCNC: 139 MMOL/L (ref 136–145)
SP GR UR REFRACTOMETRY: 1.02 (ref 1–1.03)
UROBILINOGEN UR QL STRIP.AUTO: 0.2 EU/DL (ref 0.2–1)
WBC # BLD AUTO: 12.1 K/UL (ref 4.6–13.2)
WBC URNS QL MICRO: ABNORMAL /HPF (ref 0–4)

## 2023-03-24 PROCEDURE — 85027 COMPLETE CBC AUTOMATED: CPT

## 2023-03-24 PROCEDURE — 96374 THER/PROPH/DIAG INJ IV PUSH: CPT

## 2023-03-24 PROCEDURE — 93010 ELECTROCARDIOGRAM REPORT: CPT | Performed by: INTERNAL MEDICINE

## 2023-03-24 PROCEDURE — 74177 CT ABD & PELVIS W/CONTRAST: CPT

## 2023-03-24 PROCEDURE — 83690 ASSAY OF LIPASE: CPT

## 2023-03-24 PROCEDURE — 2580000003 HC RX 258

## 2023-03-24 PROCEDURE — 81025 URINE PREGNANCY TEST: CPT

## 2023-03-24 PROCEDURE — 81001 URINALYSIS AUTO W/SCOPE: CPT

## 2023-03-24 PROCEDURE — 76856 US EXAM PELVIC COMPLETE: CPT

## 2023-03-24 PROCEDURE — 93005 ELECTROCARDIOGRAM TRACING: CPT | Performed by: EMERGENCY MEDICINE

## 2023-03-24 PROCEDURE — 6360000004 HC RX CONTRAST MEDICATION: Performed by: EMERGENCY MEDICINE

## 2023-03-24 PROCEDURE — 6360000002 HC RX W HCPCS

## 2023-03-24 PROCEDURE — 99285 EMERGENCY DEPT VISIT HI MDM: CPT

## 2023-03-24 PROCEDURE — 80053 COMPREHEN METABOLIC PANEL: CPT

## 2023-03-24 RX ORDER — 0.9 % SODIUM CHLORIDE 0.9 %
500 INTRAVENOUS SOLUTION INTRAVENOUS ONCE
Status: COMPLETED | OUTPATIENT
Start: 2023-03-24 | End: 2023-03-24

## 2023-03-24 RX ORDER — MORPHINE SULFATE 2 MG/ML
2 INJECTION, SOLUTION INTRAMUSCULAR; INTRAVENOUS
Status: COMPLETED | OUTPATIENT
Start: 2023-03-24 | End: 2023-03-24

## 2023-03-24 RX ORDER — NAPROXEN 250 MG/1
250 TABLET ORAL 2 TIMES DAILY WITH MEALS
Qty: 180 TABLET | Refills: 1 | Status: SHIPPED | OUTPATIENT
Start: 2023-03-24

## 2023-03-24 RX ORDER — ONDANSETRON 4 MG/1
4 TABLET, ORALLY DISINTEGRATING ORAL 3 TIMES DAILY PRN
Qty: 21 TABLET | Refills: 0 | Status: SHIPPED | OUTPATIENT
Start: 2023-03-24

## 2023-03-24 RX ADMIN — MORPHINE SULFATE 2 MG: 2 INJECTION, SOLUTION INTRAMUSCULAR; INTRAVENOUS at 10:40

## 2023-03-24 RX ADMIN — SODIUM CHLORIDE 500 ML: 9 INJECTION, SOLUTION INTRAVENOUS at 10:41

## 2023-03-24 RX ADMIN — IOPAMIDOL 70 ML: 612 INJECTION, SOLUTION INTRAVENOUS at 09:23

## 2023-03-24 ASSESSMENT — ENCOUNTER SYMPTOMS
DIARRHEA: 0
SHORTNESS OF BREATH: 0
BLOOD IN STOOL: 0
VOMITING: 1
NAUSEA: 1
SINUS PRESSURE: 0
CHEST TIGHTNESS: 0
SORE THROAT: 0
RHINORRHEA: 0
ABDOMINAL PAIN: 1
CONSTIPATION: 0

## 2023-03-24 ASSESSMENT — PAIN SCALES - GENERAL: PAINLEVEL_OUTOF10: 8

## 2023-03-24 ASSESSMENT — PAIN - FUNCTIONAL ASSESSMENT: PAIN_FUNCTIONAL_ASSESSMENT: 0-10

## 2023-03-24 NOTE — ED PROVIDER NOTES
ED if worse     Diagnosis     Clinical Impression:   1. Generalized abdominal pain    2. Nausea and vomiting, unspecified vomiting type    3. Cyst of right ovary        Attestations:    Raheem Ivy PA-C    Please note that this dictation was completed with [employer]+, the computer voice recognition software. Quite often unanticipated grammatical, syntax, homophones, and other interpretive errors are inadvertently transcribed by the computer software. Please disregard these errors. Please excuse any errors that have escaped final proofreading. Thank you.         Raheem Ivy PA-C  03/24/23 2760

## 2023-03-24 NOTE — Clinical Note
Raquel Nuñez was seen and treated in our emergency department on 3/24/2023. She may return to work on 03/27/2023. If you have any questions or concerns, please don't hesitate to call.       Nova Gallo PA-C

## 2023-03-24 NOTE — Clinical Note
Mai Ashraf was seen and treated in our emergency department on 3/24/2023. She may return to work on 03/27/2023. If you have any questions or concerns, please don't hesitate to call.       Freedom Torres PA-C

## 2023-03-24 NOTE — DISCHARGE INSTRUCTIONS
To take symptomatic management, and to return to the ED in immediately for any worsening symptoms, and should follow-up with OB/GYN regarding cyst,. Patient can take naproxen for pain relief but should not be taken any chance of pregnancy.

## 2023-03-27 ENCOUNTER — HOSPITAL ENCOUNTER (EMERGENCY)
Facility: HOSPITAL | Age: 33
Discharge: HOME | End: 2023-03-28
Attending: EMERGENCY MEDICINE
Payer: MEDICAID

## 2023-03-27 DIAGNOSIS — K76.0 FATTY LIVER: Primary | ICD-10-CM

## 2023-03-27 DIAGNOSIS — R10.84 GENERALIZED ABDOMINAL PAIN: ICD-10-CM

## 2023-03-27 DIAGNOSIS — N93.8 DUB (DYSFUNCTIONAL UTERINE BLEEDING): ICD-10-CM

## 2023-03-27 DIAGNOSIS — R10.84 ABDOMINAL PAIN, ACUTE, GENERALIZED: ICD-10-CM

## 2023-03-27 DIAGNOSIS — R11.0 NAUSEA: ICD-10-CM

## 2023-03-27 LAB
ALBUMIN SERPL-MCNC: 3.4 G/DL (ref 3.4–5)
ALBUMIN/GLOB SERPL: 0.9 (ref 0.8–1.7)
ALP SERPL-CCNC: 96 U/L (ref 45–117)
ALT SERPL-CCNC: 23 U/L (ref 13–56)
ANION GAP SERPL CALC-SCNC: 3 MMOL/L (ref 3–18)
APPEARANCE UR: CLEAR
AST SERPL-CCNC: 12 U/L (ref 10–38)
BACTERIA URNS QL MICRO: ABNORMAL /HPF
BASOPHILS # BLD: 0 K/UL (ref 0–0.1)
BASOPHILS NFR BLD: 0 % (ref 0–2)
BILIRUB SERPL-MCNC: 1.3 MG/DL (ref 0.2–1)
BILIRUB UR QL: NEGATIVE
BUN SERPL-MCNC: 19 MG/DL (ref 7–18)
BUN/CREAT SERPL: 26 (ref 12–20)
CALCIUM SERPL-MCNC: 8.8 MG/DL (ref 8.5–10.1)
CHLORIDE SERPL-SCNC: 108 MMOL/L (ref 100–111)
CO2 SERPL-SCNC: 28 MMOL/L (ref 21–32)
COLOR UR: YELLOW
CREAT SERPL-MCNC: 0.72 MG/DL (ref 0.6–1.3)
DIFFERENTIAL METHOD BLD: ABNORMAL
EOSINOPHIL # BLD: 0.2 K/UL (ref 0–0.4)
EOSINOPHIL NFR BLD: 3 % (ref 0–5)
EPITH CASTS URNS QL MICRO: ABNORMAL /LPF (ref 0–5)
ERYTHROCYTE [DISTWIDTH] IN BLOOD BY AUTOMATED COUNT: 11.8 % (ref 11.6–14.5)
GLOBULIN SER CALC-MCNC: 3.7 G/DL (ref 2–4)
GLUCOSE SERPL-MCNC: 139 MG/DL (ref 74–99)
GLUCOSE UR STRIP.AUTO-MCNC: NEGATIVE MG/DL
HCG UR QL: NEGATIVE
HCT VFR BLD AUTO: 38.7 % (ref 35–45)
HGB BLD-MCNC: 13.3 G/DL (ref 12–16)
HGB UR QL STRIP: ABNORMAL
IMM GRANULOCYTES # BLD AUTO: 0 K/UL (ref 0–0.04)
IMM GRANULOCYTES NFR BLD AUTO: 0 % (ref 0–0.5)
KETONES UR QL STRIP.AUTO: NEGATIVE MG/DL
LEUKOCYTE ESTERASE UR QL STRIP.AUTO: NEGATIVE
LIPASE SERPL-CCNC: 191 U/L (ref 73–393)
LYMPHOCYTES # BLD: 2.5 K/UL (ref 0.9–3.6)
LYMPHOCYTES NFR BLD: 30 % (ref 21–52)
MCH RBC QN AUTO: 33.5 PG (ref 24–34)
MCHC RBC AUTO-ENTMCNC: 34.4 G/DL (ref 31–37)
MCV RBC AUTO: 97.5 FL (ref 78–100)
MONOCYTES # BLD: 0.6 K/UL (ref 0.05–1.2)
MONOCYTES NFR BLD: 7 % (ref 3–10)
NEUTS SEG # BLD: 5 K/UL (ref 1.8–8)
NEUTS SEG NFR BLD: 60 % (ref 40–73)
NITRITE UR QL STRIP.AUTO: NEGATIVE
NRBC # BLD: 0 K/UL (ref 0–0.01)
NRBC BLD-RTO: 0 PER 100 WBC
PH UR STRIP: 6 (ref 5–8)
PLATELET # BLD AUTO: 236 K/UL (ref 135–420)
PMV BLD AUTO: 9.9 FL (ref 9.2–11.8)
POTASSIUM SERPL-SCNC: 3.8 MMOL/L (ref 3.5–5.5)
PROT SERPL-MCNC: 7.1 G/DL (ref 6.4–8.2)
PROT UR STRIP-MCNC: NEGATIVE MG/DL
RBC # BLD AUTO: 3.97 M/UL (ref 4.2–5.3)
RBC #/AREA URNS HPF: ABNORMAL /HPF (ref 0–5)
SODIUM SERPL-SCNC: 139 MMOL/L (ref 136–145)
SP GR UR REFRACTOMETRY: 1.02 (ref 1–1.03)
UROBILINOGEN UR QL STRIP.AUTO: 1 EU/DL (ref 0.2–1)
WBC # BLD AUTO: 8.4 K/UL (ref 4.6–13.2)
WBC URNS QL MICRO: ABNORMAL /HPF (ref 0–4)

## 2023-03-27 PROCEDURE — 96374 THER/PROPH/DIAG INJ IV PUSH: CPT

## 2023-03-27 PROCEDURE — 99284 EMERGENCY DEPT VISIT MOD MDM: CPT

## 2023-03-27 PROCEDURE — 6360000002 HC RX W HCPCS: Performed by: PHYSICIAN ASSISTANT

## 2023-03-27 PROCEDURE — 81025 URINE PREGNANCY TEST: CPT

## 2023-03-27 PROCEDURE — 80053 COMPREHEN METABOLIC PANEL: CPT

## 2023-03-27 PROCEDURE — 96375 TX/PRO/DX INJ NEW DRUG ADDON: CPT

## 2023-03-27 PROCEDURE — 81001 URINALYSIS AUTO W/SCOPE: CPT

## 2023-03-27 PROCEDURE — 2580000003 HC RX 258: Performed by: PHYSICIAN ASSISTANT

## 2023-03-27 PROCEDURE — 85025 COMPLETE CBC W/AUTO DIFF WBC: CPT

## 2023-03-27 PROCEDURE — 83690 ASSAY OF LIPASE: CPT

## 2023-03-27 RX ORDER — MORPHINE SULFATE 4 MG/ML
4 INJECTION, SOLUTION INTRAMUSCULAR; INTRAVENOUS
Status: COMPLETED | OUTPATIENT
Start: 2023-03-27 | End: 2023-03-27

## 2023-03-27 RX ORDER — KETOROLAC TROMETHAMINE 15 MG/ML
30 INJECTION, SOLUTION INTRAMUSCULAR; INTRAVENOUS
Status: COMPLETED | OUTPATIENT
Start: 2023-03-27 | End: 2023-03-27

## 2023-03-27 RX ORDER — 0.9 % SODIUM CHLORIDE 0.9 %
1000 INTRAVENOUS SOLUTION INTRAVENOUS ONCE
Status: COMPLETED | OUTPATIENT
Start: 2023-03-27 | End: 2023-03-28

## 2023-03-27 RX ADMIN — MORPHINE SULFATE 4 MG: 4 INJECTION, SOLUTION INTRAMUSCULAR; INTRAVENOUS at 23:20

## 2023-03-27 RX ADMIN — SODIUM CHLORIDE 1000 ML: 900 INJECTION, SOLUTION INTRAVENOUS at 23:20

## 2023-03-27 RX ADMIN — KETOROLAC TROMETHAMINE 30 MG: 15 INJECTION, SOLUTION INTRAMUSCULAR; INTRAVENOUS at 23:20

## 2023-03-27 ASSESSMENT — PAIN - FUNCTIONAL ASSESSMENT: PAIN_FUNCTIONAL_ASSESSMENT: 0-10

## 2023-03-27 ASSESSMENT — PAIN SCALES - GENERAL
PAINLEVEL_OUTOF10: 10
PAINLEVEL_OUTOF10: 8

## 2023-03-27 NOTE — ED TRIAGE NOTES
Patient comes in via EMS c/o right lower abd. Pain patient is homeless found outside [facility]  Diagnosed w/ R.  Ovarian cyst on Friday  Onset pain last night  Went through an entire box of tampons size regular in one night

## 2023-03-28 ENCOUNTER — APPOINTMENT (OUTPATIENT)
Facility: HOSPITAL | Age: 33
End: 2023-03-28
Payer: MEDICAID

## 2023-03-28 VITALS
SYSTOLIC BLOOD PRESSURE: 97 MMHG | RESPIRATION RATE: 16 BRPM | TEMPERATURE: 98.4 F | HEIGHT: 56 IN | HEART RATE: 57 BPM | BODY MASS INDEX: 21.37 KG/M2 | DIASTOLIC BLOOD PRESSURE: 63 MMHG | WEIGHT: 95 LBS | OXYGEN SATURATION: 100 %

## 2023-03-28 PROCEDURE — 6360000002 HC RX W HCPCS: Performed by: PHYSICIAN ASSISTANT

## 2023-03-28 PROCEDURE — 76830 TRANSVAGINAL US NON-OB: CPT

## 2023-03-28 PROCEDURE — 96376 TX/PRO/DX INJ SAME DRUG ADON: CPT

## 2023-03-28 PROCEDURE — 76705 ECHO EXAM OF ABDOMEN: CPT

## 2023-03-28 RX ORDER — HYDROCODONE BITARTRATE AND ACETAMINOPHEN 5; 325 MG/1; MG/1
1 TABLET ORAL EVERY 6 HOURS PRN
Qty: 8 TABLET | Refills: 0 | Status: SHIPPED | OUTPATIENT
Start: 2023-03-28 | End: 2023-03-31

## 2023-03-28 RX ORDER — MORPHINE SULFATE 2 MG/ML
2 INJECTION, SOLUTION INTRAMUSCULAR; INTRAVENOUS
Status: COMPLETED | OUTPATIENT
Start: 2023-03-28 | End: 2023-03-28

## 2023-03-28 RX ORDER — ONDANSETRON 4 MG/1
4 TABLET, ORALLY DISINTEGRATING ORAL 3 TIMES DAILY PRN
Qty: 21 TABLET | Refills: 0 | Status: SHIPPED | OUTPATIENT
Start: 2023-03-28

## 2023-03-28 RX ORDER — KETOROLAC TROMETHAMINE 10 MG/1
10 TABLET, FILM COATED ORAL EVERY 6 HOURS PRN
Qty: 20 TABLET | Refills: 0 | Status: SHIPPED | OUTPATIENT
Start: 2023-03-28

## 2023-03-28 RX ADMIN — MORPHINE SULFATE 2 MG: 2 INJECTION, SOLUTION INTRAMUSCULAR; INTRAVENOUS at 02:08

## 2023-03-28 ASSESSMENT — ENCOUNTER SYMPTOMS
STRIDOR: 0
COUGH: 0
EYE DISCHARGE: 0
NAUSEA: 1
WHEEZING: 0
VOMITING: 0
EYE REDNESS: 0
SORE THROAT: 0
ABDOMINAL PAIN: 1
SHORTNESS OF BREATH: 0
RHINORRHEA: 0
BACK PAIN: 0

## 2023-03-28 ASSESSMENT — PAIN SCALES - GENERAL: PAINLEVEL_OUTOF10: 5

## 2023-03-28 NOTE — ED PROVIDER NOTES
Glucose, UA Negative NEG mg/dL    Ketones, Urine Negative NEG mg/dL    Bilirubin Urine Negative NEG      Blood, Urine LARGE (A) NEG      Urobilinogen, Urine 1.0 0.2 - 1.0 EU/dL    Nitrite, Urine Negative NEG      Leukocyte Esterase, Urine Negative NEG     Pregnancy, Urine    Collection Time: 03/27/23  7:39 PM   Result Value Ref Range    HCG(Urine) Pregnancy Test Negative NEG     Urinalysis, Micro    Collection Time: 03/27/23  7:39 PM   Result Value Ref Range    WBC, UA 5 to 9 0 - 4 /hpf    RBC, UA 5 to 9 0 - 5 /hpf    Epithelial Cells UA 2+ 0 - 5 /lpf    BACTERIA, URINE 2+ (A) NEG /hpf   CBC with Auto Differential    Collection Time: 03/27/23  8:07 PM   Result Value Ref Range    WBC 8.4 4.6 - 13.2 K/uL    RBC 3.97 (L) 4.20 - 5.30 M/uL    Hemoglobin 13.3 12.0 - 16.0 g/dL    Hematocrit 38.7 35.0 - 45.0 %    MCV 97.5 78.0 - 100.0 FL    MCH 33.5 24.0 - 34.0 PG    MCHC 34.4 31.0 - 37.0 g/dL    RDW 11.8 11.6 - 14.5 %    Platelets 122 543 - 897 K/uL    MPV 9.9 9.2 - 11.8 FL    Nucleated RBCs 0.0 0  WBC    nRBC 0.00 0.00 - 0.01 K/uL    Seg Neutrophils 60 40 - 73 %    Lymphocytes 30 21 - 52 %    Monocytes 7 3 - 10 %    Eosinophils % 3 0 - 5 %    Basophils 0 0 - 2 %    Immature Granulocytes 0 0.0 - 0.5 %    Segs Absolute 5.0 1.8 - 8.0 K/UL    Absolute Lymph # 2.5 0.9 - 3.6 K/UL    Absolute Mono # 0.6 0.05 - 1.2 K/UL    Absolute Eos # 0.2 0.0 - 0.4 K/UL    Basophils Absolute 0.0 0.0 - 0.1 K/UL    Absolute Immature Granulocyte 0.0 0.00 - 0.04 K/UL    Differential Type AUTOMATED     Comprehensive Metabolic Panel    Collection Time: 03/27/23  8:07 PM   Result Value Ref Range    Sodium 139 136 - 145 mmol/L    Potassium 3.8 3.5 - 5.5 mmol/L    Chloride 108 100 - 111 mmol/L    CO2 28 21 - 32 mmol/L    Anion Gap 3 3.0 - 18 mmol/L    Glucose 139 (H) 74 - 99 mg/dL    BUN 19 (H) 7.0 - 18 MG/DL    Creatinine 0.72 0.6 - 1.3 MG/DL    Bun/Cre Ratio 26 (H) 12 - 20      Est, Glom Filt Rate >60 >60 ml/min/1.73m2    Calcium 8.8 8.5 -

## 2023-03-28 NOTE — DISCHARGE INSTRUCTIONS
Thank you for requesting your Continuity of Care Document (CCD) electronically. Please follow the instructions below to securely access your online medical record. [employer] allows you to send messages to your doctor, view your test results, renew your prescriptions, schedule appointments, and more. How Do I Access my CCD? In your Internet browser, go to [email or web address] Enter your user name and password   Click on My medical Record  --> Download Summary --> Enter Password --> Download --> Save or Open Document    Additional Information  If you have questions, please contact your physician practice where you receive care. Remember, [employer] is NOT to be used for urgent needs. For medical emergencies, dial 911.

## 2023-03-28 NOTE — ED NOTES
Pt given both verbal and written dc instructions, verbalized understanding. All questions answered. Pt ambulatory with steady gait in no distress at time of discharge.      Parminder Moody RN  03/28/23 0600

## 2023-06-04 ENCOUNTER — HOSPITAL ENCOUNTER (EMERGENCY)
Facility: HOSPITAL | Age: 33
Discharge: HOME | End: 2023-06-04
Attending: EMERGENCY MEDICINE

## 2023-06-04 ENCOUNTER — APPOINTMENT (OUTPATIENT)
Facility: HOSPITAL | Age: 33
End: 2023-06-04

## 2023-06-04 VITALS
DIASTOLIC BLOOD PRESSURE: 73 MMHG | OXYGEN SATURATION: 99 % | BODY MASS INDEX: 22.42 KG/M2 | TEMPERATURE: 98.3 F | HEART RATE: 72 BPM | RESPIRATION RATE: 16 BRPM | WEIGHT: 100 LBS | SYSTOLIC BLOOD PRESSURE: 133 MMHG

## 2023-06-04 DIAGNOSIS — M79.10 MYALGIA: Primary | ICD-10-CM

## 2023-06-04 DIAGNOSIS — K13.79 MOUTH PAIN: ICD-10-CM

## 2023-06-04 LAB
APPEARANCE UR: NORMAL
BILIRUB UR QL: NEGATIVE
COLOR UR: YELLOW
GLUCOSE UR STRIP.AUTO-MCNC: NEGATIVE MG/DL
HCG UR QL: NEGATIVE
HGB UR QL STRIP: NEGATIVE
KETONES UR QL STRIP.AUTO: NEGATIVE MG/DL
LEUKOCYTE ESTERASE UR QL STRIP.AUTO: NEGATIVE
NITRITE UR QL STRIP.AUTO: NEGATIVE
PH UR STRIP: 7.5 (ref 5–8)
PROT UR STRIP-MCNC: NEGATIVE MG/DL
SP GR UR REFRACTOMETRY: 1.02 (ref 1–1.03)
UROBILINOGEN UR QL STRIP.AUTO: 1 EU/DL (ref 0.2–1)

## 2023-06-04 PROCEDURE — 81003 URINALYSIS AUTO W/O SCOPE: CPT

## 2023-06-04 PROCEDURE — 81025 URINE PREGNANCY TEST: CPT

## 2023-06-04 PROCEDURE — 70360 X-RAY EXAM OF NECK: CPT

## 2023-06-04 PROCEDURE — 6370000000 HC RX 637 (ALT 250 FOR IP): Performed by: EMERGENCY MEDICINE

## 2023-06-04 RX ORDER — CETIRIZINE HYDROCHLORIDE 10 MG/1
10 TABLET ORAL DAILY
Qty: 7 TABLET | Refills: 0 | Status: SHIPPED | OUTPATIENT
Start: 2023-06-04 | End: 2023-06-11

## 2023-06-04 RX ORDER — CYCLOBENZAPRINE HCL 10 MG
10 TABLET ORAL 3 TIMES DAILY PRN
Qty: 10 TABLET | Refills: 0 | Status: SHIPPED | OUTPATIENT
Start: 2023-06-04 | End: 2023-06-14

## 2023-06-04 RX ORDER — ACETAMINOPHEN 325 MG/1
650 TABLET ORAL
Status: COMPLETED | OUTPATIENT
Start: 2023-06-04 | End: 2023-06-04

## 2023-06-04 RX ORDER — CETIRIZINE HYDROCHLORIDE 10 MG/1
10 TABLET ORAL
Status: COMPLETED | OUTPATIENT
Start: 2023-06-04 | End: 2023-06-04

## 2023-06-04 RX ORDER — ACETAMINOPHEN 500 MG
500 TABLET ORAL EVERY 6 HOURS PRN
Qty: 30 TABLET | Refills: 1 | Status: SHIPPED | OUTPATIENT
Start: 2023-06-04

## 2023-06-04 RX ADMIN — ACETAMINOPHEN 650 MG: 325 TABLET ORAL at 08:21

## 2023-06-04 RX ADMIN — CETIRIZINE HYDROCHLORIDE 10 MG: 10 TABLET, FILM COATED ORAL at 08:21

## 2023-06-04 ASSESSMENT — ENCOUNTER SYMPTOMS
ABDOMINAL PAIN: 0
BACK PAIN: 1
EYES NEGATIVE: 1
CHEST TIGHTNESS: 0

## 2024-06-22 ENCOUNTER — HOSPITAL ENCOUNTER (EMERGENCY)
Facility: HOSPITAL | Age: 34
Discharge: HOME | End: 2024-06-22
Payer: MEDICAID

## 2024-06-22 VITALS
TEMPERATURE: 97.9 F | SYSTOLIC BLOOD PRESSURE: 138 MMHG | DIASTOLIC BLOOD PRESSURE: 96 MMHG | RESPIRATION RATE: 18 BRPM | OXYGEN SATURATION: 100 % | HEIGHT: 56 IN | BODY MASS INDEX: 24.75 KG/M2 | HEART RATE: 62 BPM | WEIGHT: 110 LBS

## 2024-06-22 DIAGNOSIS — L73.9 FOLLICULITIS: Primary | ICD-10-CM

## 2024-06-22 DIAGNOSIS — Z32.02 NEGATIVE PREGNANCY TEST: ICD-10-CM

## 2024-06-22 LAB
ALBUMIN SERPL-MCNC: 3.4 G/DL (ref 3.4–5)
ALBUMIN/GLOB SERPL: 1 (ref 0.8–1.7)
ALP SERPL-CCNC: 94 U/L (ref 45–117)
ALT SERPL-CCNC: 26 U/L (ref 13–56)
ANION GAP SERPL CALC-SCNC: 4 MMOL/L (ref 3–18)
AST SERPL-CCNC: 33 U/L (ref 10–38)
BILIRUB SERPL-MCNC: 0.6 MG/DL (ref 0.2–1)
BUN SERPL-MCNC: 10 MG/DL (ref 7–18)
BUN/CREAT SERPL: 18 (ref 12–20)
CALCIUM SERPL-MCNC: 8.8 MG/DL (ref 8.5–10.1)
CHLORIDE SERPL-SCNC: 105 MMOL/L (ref 100–111)
CO2 SERPL-SCNC: 27 MMOL/L (ref 21–32)
CREAT SERPL-MCNC: 0.56 MG/DL (ref 0.6–1.3)
ERYTHROCYTE [DISTWIDTH] IN BLOOD BY AUTOMATED COUNT: 12.5 % (ref 11.6–14.5)
GLOBULIN SER CALC-MCNC: 3.5 G/DL (ref 2–4)
GLUCOSE SERPL-MCNC: 113 MG/DL (ref 74–99)
HCG SERPL QL: NEGATIVE
HCG SERPL-ACNC: <1 MIU/ML (ref 0–10)
HCT VFR BLD AUTO: 38.8 % (ref 35–45)
HGB BLD-MCNC: 13.4 G/DL (ref 12–16)
MCH RBC QN AUTO: 33.8 PG (ref 24–34)
MCHC RBC AUTO-ENTMCNC: 34.5 G/DL (ref 31–37)
MCV RBC AUTO: 98 FL (ref 78–100)
NRBC # BLD: 0 K/UL (ref 0–0.01)
NRBC BLD-RTO: 0 PER 100 WBC
PLATELET # BLD AUTO: 229 K/UL (ref 135–420)
PMV BLD AUTO: 10.7 FL (ref 9.2–11.8)
POTASSIUM SERPL-SCNC: 3.4 MMOL/L (ref 3.5–5.5)
PROT SERPL-MCNC: 6.9 G/DL (ref 6.4–8.2)
RBC # BLD AUTO: 3.96 M/UL (ref 4.2–5.3)
SERVICE CMNT-IMP: NORMAL
SODIUM SERPL-SCNC: 136 MMOL/L (ref 136–145)
WBC # BLD AUTO: 10.4 K/UL (ref 4.6–13.2)
WET PREP GENITAL: NORMAL

## 2024-06-22 PROCEDURE — 87210 SMEAR WET MOUNT SALINE/INK: CPT

## 2024-06-22 PROCEDURE — 84703 CHORIONIC GONADOTROPIN ASSAY: CPT

## 2024-06-22 PROCEDURE — 80053 COMPREHEN METABOLIC PANEL: CPT

## 2024-06-22 PROCEDURE — 99283 EMERGENCY DEPT VISIT LOW MDM: CPT

## 2024-06-22 PROCEDURE — 85027 COMPLETE CBC AUTOMATED: CPT

## 2024-06-22 PROCEDURE — 87491 CHLMYD TRACH DNA AMP PROBE: CPT

## 2024-06-22 PROCEDURE — 87591 N.GONORRHOEAE DNA AMP PROB: CPT

## 2024-06-22 PROCEDURE — 84702 CHORIONIC GONADOTROPIN TEST: CPT

## 2024-06-22 PROCEDURE — 87661 TRICHOMONAS VAGINALIS AMPLIF: CPT

## 2024-06-22 RX ORDER — CEPHALEXIN 500 MG/1
500 CAPSULE ORAL 2 TIMES DAILY
Qty: 20 CAPSULE | Refills: 0 | Status: SHIPPED | OUTPATIENT
Start: 2024-06-22 | End: 2024-07-02

## 2024-06-22 ASSESSMENT — PAIN DESCRIPTION - LOCATION: LOCATION: ABDOMEN

## 2024-06-22 ASSESSMENT — PAIN SCALES - GENERAL: PAINLEVEL_OUTOF10: 7

## 2024-06-22 ASSESSMENT — ENCOUNTER SYMPTOMS
ALLERGIC/IMMUNOLOGIC NEGATIVE: 1
GASTROINTESTINAL NEGATIVE: 1
EYES NEGATIVE: 1
RESPIRATORY NEGATIVE: 1

## 2024-06-22 NOTE — ED PROVIDER NOTES
[facility] EMERGENCY DEPT  EMERGENCY DEPARTMENT ENCOUNTER      Pt Name: Dior De La Cruz  MRN: [id number]  Birthdate 1990  Date of evaluation: 2024  Provider: ROCÍO Negron  2:50 AM    CHIEF COMPLAINT       Chief Complaint   Patient presents with    Abdominal Pain    Abscess         HISTORY OF PRESENT ILLNESS    Dior De La Cruz is a 34 y.o. female who presents to the emergency department with concern that she is pregnant and having pain.  Patient states she is a  Ab1 with a history of a prior ectopic.  Has not had any prenatal care.  She states she has had a positive pregnancy test maybe for 5 months.  Has had bleeding every month.    \[facility]\""    Nursing Notes were reviewed.    REVIEW OF SYSTEMS       Review of Systems   Constitutional: Negative.    HENT: Negative.     Eyes: Negative.    Respiratory: Negative.     Cardiovascular: Negative.    Gastrointestinal: Negative.    Endocrine: Negative.    Genitourinary:  Positive for pelvic pain and vaginal bleeding.   Musculoskeletal: Negative.    Skin: Negative.    Allergic/Immunologic: Negative.    Hematological: Negative.    Psychiatric/Behavioral: Negative.         Except as noted above the remainder of the review of systems was reviewed and negative.       PAST MEDICAL HISTORY     Past Medical History:   Diagnosis Date    Bipolar 1 disorder, depressed (HCC)     Cocaine abuse (HCC)     Neurological disorder     Pancreatitis     Psychiatric disorder          SURGICAL HISTORY       Past Surgical History:   Procedure Laterality Date    PELVIC LAPAROSCOPY      for perforated IUD removal    UPPER GI ENDOSCOPY,BIOPSY  3/29/2017              CURRENT MEDICATIONS       Previous Medications    ACETAMINOPHEN (TYLENOL) 500 MG TABLET    Take 1 tablet by mouth every 6 hours as needed for Pain       ALLERGIES     Latex and Doxycycline    FAMILY HISTORY     No family history on file.       SOCIAL HISTORY       Social History     Socioeconomic History    Marital status: Single   Tobacco  Adequate  Transabdominal transverse:  Adequate  Intrauterine Pregnancy:  Absent  Interpretation:  No definitive intrauterine pregnancy      1. Folliculitis    2. Negative pregnancy test          DISPOSITION/PLAN   DISPOSITION Decision To Discharge 06/22/2024 02:49:34 AM      PATIENT REFERRED TO:  Zhen Salazar MD  [address] [facility] [address]  [phone]          [facility] EMERGENCY DEPT  [address] [address]  [phone]          DISCHARGE MEDICATIONS:  New Prescriptions    CEPHALEXIN (KEFLEX) 500 MG CAPSULE    Take 1 capsule by mouth 2 times daily for 10 days     Controlled Substances Monitoring:          No data to display                (Please note that portions of this note were completed with a voice recognition program.  Efforts were made to edit the dictations but occasionally words are mis-transcribed.)    ROCÍO Negron (electronically signed)  Attending Emergency Physician           Samina Lowry PA  06/22/24 0250      12:21 PM  Patient returned call made aware of positive trichomonas test result and availability of antibiotics at her preferred pharmacy.- thao poon Jana L, PA  06/26/24 5019

## 2024-06-22 NOTE — ED TRIAGE NOTES
Pt arrived via Triage ambulatory c/o abdominal pain and lower back pain. States she is pregnant but doesn't know how far along. Unknown LMP: pt states she bleeds all the time. Pt also states she has an abscess on her inner right thigh.     Pt questioned more about the pregnancy pt thinks she may be 5 months pregnant. Pt denies any prenatal care at this time.

## 2024-06-26 RX ORDER — METRONIDAZOLE 500 MG/1
500 TABLET ORAL 2 TIMES DAILY
Qty: 14 TABLET | Refills: 0 | Status: SHIPPED | OUTPATIENT
Start: 2024-06-26 | End: 2024-07-03

## 2025-03-19 NOTE — PROGRESS NOTES
Called and spoke with patient's Mom and informed her of Dr. Duval's recommendations and she voiced understanding and agreed with plan.  Per Mom patient states his throat is feeling better now.  Per Mom she will wait to start the Zpak until tomorrow to see how he does tomorrow and if not much better, she will have him start the Zpak.  Mom will call back with an update if no better by next week or if patient worsen.   Progress Note    Patient: Dax Angela MRN: [id number]  CSN: [id number]    YOB: 1990  Age: 33 y.o. Sex: female    DOA: 5/2/2018 LOS:  LOS: 1 day                    Subjective:     Dax Angela is a 33 y.o.  female who was called back to the ER after blood cultures returned positive for admission with IV antibiotics. Patient originally presented to ER on 5/1/18 for left flank pain, nauea, vomiting, similar to prior history of pancreatitis. CT Abd and exam with findings of mild pyelonephritis, patient given IV cefepime in ER and discharged home with po bactrim and pain medications. Patient reports taking medication as prescribed, still having significant left flank pain, having hesitency with urination but no burning, frequency or pain. pt has chronic abdominal pain and seen by GI last admission and  Extensive work up.     Patient started on vancomycin and zosyn and admitted for IV antibiotic therapy pending culture results. Repeat blood culture is negative    CT scan abdomen     1. Left kidney patchy hypoenhancement, nonspecific, suspicious for  pyelonephritis. Recommend clinical correlation.     2. Mild perirectal/presacral fat stranding, similar to recent priors,  nonspecific. Correlate clinically. Iron stauration 505 iron is low 17 TIBC 198 will recheck iron sturation  Chief Complaint:   Chief Complaint   Patient presents with    Other     positive blood cultures    Back Pain       Review of systems   GENERAL: Patient alert, awake and oriented times 3, able to communicate full sentences and not in distress. HEENT: No change in vision, no earache, tinnitus, sore throat or sinus congestion. NECK: No pain or stiffness. CARDIOVASCULAR: No chest pain or pressure. No palpitations. PULMONARY: No shortness of breath, cough or wheeze. GASTROINTESTINAL: Left lower quadrant pain radiating from left flank, + nausea, no vomiting .   No diarrhea, melena or       bright red blood per rectum. GENITOURINARY: No urinary frequency, urgency, or dysuria. +hesitency and left flank pain. MUSCULOSKELETAL: No joint or muscle pain, no back pain, no recent trauma. DERMATOLOGIC: No rash, no itching, no lesions. ENDOCRINE: No polyuria, polydipsia, no heat or cold intolerance. HEMATOLOGICAL: No anemia or easy bruising or bleeding. NEUROLOGIC: No headache, seizures, numbness, tingling or weakness. PSYCHIATRIC:  Hx of bipolar disorder, depression, anxiety feels controlled currently       Objective:     Physical Exam:  Visit Vitals    /69 (BP 1 Location: Left arm, BP Patient Position: At rest)    Pulse 81    Temp 98.5 °F (36.9 °C)    Resp 18    Ht 4' 8\" (1.422 m)    Wt 43.1 kg (95 lb)    SpO2 96%    Breastfeeding No    BMI 21.3 kg/m2            General:  Alert, cooperative, no distress, appears stated age. Head:  Normocephalic, without obvious abnormality, atraumatic. Eyes:  Conjunctivae/corneas clear. PERRL, EOMs intact. Nose: Nares normal. No drainage or sinus tenderness. Throat: Lips, mucosa, and tongue normal.    Neck: Supple, symmetrical, trachea midline, no adenopathy   Back:   +L CVAT   Lungs:   Clear to auscultation bilaterally. Chest wall:  No tenderness or deformity. Heart:  Regular rate and rhythm, S1, S2 normal, no murmur, click, rub or gallop. Abdomen: Soft, Mild TTP LLQ. No rebound. Bowel sounds normal. No masses,  No organomegaly. Extremities: Extremities normal, atraumatic, no cyanosis or edema. Pulses: 2+ and symmetric all extremities. Skin: Skin color, texture, turgor normal. No rashes or lesions   Neurologic: CNII-XII intact. No focal motor or sensory deficit.          Intake and Output:  Current Shift:     Last three shifts:  05/02 1901 - 05/04 0700  In: 2026 [P.O.:175;  I.V.:1851]  Out: 870 [Urine:750]    Labs: Results:       Chemistry Recent Labs      05/04/18   0457  05/03/18   0230  05/01/18   2208   GLU  121*  83  111*   NA  141 135*  134*   K  3.4*  3.2*  3.5   CL  108  100  99*   CO2  26  28  27   BUN  5*  5*  10   CREA  0.62  0.65  0.90   CA  7.3*  8.0*  8.7   AGAP  7  7  8   BUCR  8*  8*  11*   AP  189*   --   211*   TP  5.2*   --   8.0   ALB  2.3*   --   3.5   GLOB  2.9   --   4.5*   AGRAT  0.8   --   0.8      CBC w/Diff Recent Labs      05/04/18   0457  05/03/18   0230  05/01/18   2208   WBC  6.0  7.9  13.2   RBC  3.36*  3.52*  4.23   HGB  10.8*  11.2*  13.9   HCT  31.7*  33.0*  40.4   PLT  148  130*  148   GRANS  61  76*  80*   LYMPH  22  12*  10*   EOS  1  0  0      Cardiac Enzymes No results for input(s): CPK, CKND1, GILLES in the last 72 hours. No lab exists for component: CKRMB, TROIP   Coagulation No results for input(s): PTP, INR, APTT in the last 72 hours. No lab exists for component: INREXT    Lipid Panel Lab Results   Component Value Date/Time    Triglyceride 106 03/24/2017 03:55 PM      BNP No results for input(s): BNPP in the last 72 hours.    Liver Enzymes Recent Labs      05/04/18 0457   TP  5.2*   ALB  2.3*   AP  189*   SGOT  25      Thyroid Studies Lab Results   Component Value Date/Time    TSH 1.58 03/25/2017 03:10 AM          Procedures/imaging: see electronic medical records for all procedures/Xrays and details which were not copied into this note but were reviewed prior to creation of Plan    Medications:   Current Facility-Administered Medications   Medication Dose Route Frequency    amitriptyline (ELAVIL) tablet 25 mg  25 mg Oral QHS    ibuprofen (MOTRIN) tablet 600 mg  600 mg Oral Q6H PRN    oxyCODONE-acetaminophen (PERCOCET) 5-325 mg per tablet 1 Tab  1 Tab Oral Q8H PRN    0.9% sodium chloride infusion  75 mL/hr IntraVENous CONTINUOUS    ondansetron (ZOFRAN ODT) tablet 4 mg  4 mg Oral Q4H PRN    enoxaparin (LOVENOX) injection 40 mg  40 mg SubCUTAneous Q24H    piperacillin-tazobactam (ZOSYN) 3.375 g in 0.9% sodium chloride (MBP/ADV) 100 mL ADV  3.375 g IntraVENous Q6H    vancomycin (VANCOCIN) 750 mg in 0.9% sodium chloride 250 mL IVPB  750 mg IntraVENous Q8H    Vancomycin TROUGH due on 5/4 at 10:30 BEFORE the dose at 11:00. Thanks!    Other ONCE    morphine injection 2 mg  2 mg IntraVENous Q4H PRN    famotidine (PEPCID) tablet 20 mg  20 mg Oral BID       Assessment/Plan     Active Problems:    Bacteremia (5/3/2018)      Pyelonephritis (5/3/2018)      Pyelonephritis with bacteremia  - continue IV ABT vancomycin and zosyn, follow up blood and urine culture  and sensitivities, de-escalate ABT as appropriate  - recheck blood cultures result pending  - NS @ 75ml/hr x24hrs  - pain control with motrin, percocet prn  - zofran prn nausea  -infectious disease consult  - urology consult     Hypokalemia   - give 20 waldo x1 today     Anemia  - check iron, b12/folic acid studies, monitor cbc     Bipolar/depression/anxiety  - continue elavil 25mg qhs  - Restart  Seroquel when she is stable     DVT/GI Prophylaxis: Lovenox/ pepcid     CODE:  FULL   cbc, cmp, mag iron saturation lactic acid    Lizette Guzman MD  5/4/2018 7:49 AM

## 2025-08-01 ENCOUNTER — APPOINTMENT (OUTPATIENT)
Facility: HOSPITAL | Age: 35
DRG: 724 | End: 2025-08-01
Payer: MEDICAID

## 2025-08-01 ENCOUNTER — HOSPITAL ENCOUNTER (INPATIENT)
Facility: HOSPITAL | Age: 35
LOS: 4 days | Discharge: HOME | DRG: 724 | End: 2025-08-05
Attending: STUDENT IN AN ORGANIZED HEALTH CARE EDUCATION/TRAINING PROGRAM | Admitting: STUDENT IN AN ORGANIZED HEALTH CARE EDUCATION/TRAINING PROGRAM
Payer: MEDICAID

## 2025-08-01 DIAGNOSIS — R21 RASH: Primary | ICD-10-CM

## 2025-08-01 PROBLEM — A28.1 CAT SCRATCH FEVER: Status: ACTIVE | Noted: 2025-08-01

## 2025-08-01 LAB
AMPHET UR QL SCN: NEGATIVE
ANION GAP SERPL CALC-SCNC: 13 MMOL/L (ref 3–18)
BARBITURATES UR QL SCN: NEGATIVE
BASOPHILS # BLD: 0.03 K/UL (ref 0–0.1)
BASOPHILS NFR BLD: 0.4 % (ref 0–2)
BENZODIAZ UR QL: NEGATIVE
BUN SERPL-MCNC: 17 MG/DL (ref 6–23)
BUN/CREAT SERPL: 22 (ref 12–20)
CALCIUM SERPL-MCNC: 9.5 MG/DL (ref 8.5–10.1)
CANNABINOIDS UR QL SCN: POSITIVE
CHLORIDE SERPL-SCNC: 100 MMOL/L (ref 98–107)
CK SERPL-CCNC: 76 U/L (ref 26–192)
CO2 SERPL-SCNC: 27 MMOL/L (ref 21–32)
COCAINE UR QL SCN: POSITIVE
CREAT SERPL-MCNC: 0.77 MG/DL (ref 0.6–1.3)
DIFFERENTIAL METHOD BLD: ABNORMAL
EOSINOPHIL # BLD: 0.2 K/UL (ref 0–0.4)
EOSINOPHIL NFR BLD: 2.7 % (ref 0–5)
ERYTHROCYTE [DISTWIDTH] IN BLOOD BY AUTOMATED COUNT: 12.8 % (ref 11.6–14.5)
FENTANYL: NEGATIVE
GLUCOSE SERPL-MCNC: 104 MG/DL (ref 74–108)
HCG UR QL: NEGATIVE
HCT VFR BLD AUTO: 40 % (ref 35–45)
HGB BLD-MCNC: 13.2 G/DL (ref 12–16)
IMM GRANULOCYTES # BLD AUTO: 0.02 K/UL (ref 0–0.04)
IMM GRANULOCYTES NFR BLD AUTO: 0.3 % (ref 0–0.5)
LYMPHOCYTES # BLD: 1.91 K/UL (ref 0.9–3.6)
LYMPHOCYTES NFR BLD: 25.8 % (ref 21–52)
Lab: ABNORMAL
MCH RBC QN AUTO: 30 PG (ref 24–34)
MCHC RBC AUTO-ENTMCNC: 33 G/DL (ref 31–37)
MCV RBC AUTO: 90.9 FL (ref 78–100)
METHADONE UR QL: NEGATIVE
MONOCYTES # BLD: 0.78 K/UL (ref 0.05–1.2)
MONOCYTES NFR BLD: 10.5 % (ref 3–10)
NEUTS SEG # BLD: 4.46 K/UL (ref 1.8–8)
NEUTS SEG NFR BLD: 60.3 % (ref 40–73)
NRBC # BLD: 0 K/UL (ref 0–0.01)
NRBC BLD-RTO: 0 PER 100 WBC
OPIATES UR QL: NEGATIVE
OXYCODONE UR QL SCN: NEGATIVE
PLATELET # BLD AUTO: 275 K/UL (ref 135–420)
PMV BLD AUTO: 9.2 FL (ref 9.2–11.8)
POTASSIUM SERPL-SCNC: 4.2 MMOL/L (ref 3.5–5.5)
RBC # BLD AUTO: 4.4 M/UL (ref 4.2–5.3)
SODIUM SERPL-SCNC: 140 MMOL/L (ref 136–145)
WBC # BLD AUTO: 7.4 K/UL (ref 4.6–13.2)

## 2025-08-01 PROCEDURE — 80048 BASIC METABOLIC PNL TOTAL CA: CPT

## 2025-08-01 PROCEDURE — 87389 HIV-1 AG W/HIV-1&-2 AB AG IA: CPT

## 2025-08-01 PROCEDURE — 6360000002 HC RX W HCPCS: Performed by: NURSE PRACTITIONER

## 2025-08-01 PROCEDURE — 82550 ASSAY OF CK (CPK): CPT

## 2025-08-01 PROCEDURE — 6360000002 HC RX W HCPCS: Performed by: STUDENT IN AN ORGANIZED HEALTH CARE EDUCATION/TRAINING PROGRAM

## 2025-08-01 PROCEDURE — 2500000003 HC RX 250 WO HCPCS: Performed by: STUDENT IN AN ORGANIZED HEALTH CARE EDUCATION/TRAINING PROGRAM

## 2025-08-01 PROCEDURE — 87040 BLOOD CULTURE FOR BACTERIA: CPT

## 2025-08-01 PROCEDURE — 87491 CHLMYD TRACH DNA AMP PROBE: CPT

## 2025-08-01 PROCEDURE — 1100000000 HC RM PRIVATE

## 2025-08-01 PROCEDURE — 99223 1ST HOSP IP/OBS HIGH 75: CPT | Performed by: STUDENT IN AN ORGANIZED HEALTH CARE EDUCATION/TRAINING PROGRAM

## 2025-08-01 PROCEDURE — 86592 SYPHILIS TEST NON-TREP QUAL: CPT

## 2025-08-01 PROCEDURE — 80307 DRUG TEST PRSMV CHEM ANLYZR: CPT

## 2025-08-01 PROCEDURE — 6370000000 HC RX 637 (ALT 250 FOR IP): Performed by: STUDENT IN AN ORGANIZED HEALTH CARE EDUCATION/TRAINING PROGRAM

## 2025-08-01 PROCEDURE — 94761 N-INVAS EAR/PLS OXIMETRY MLT: CPT

## 2025-08-01 PROCEDURE — 71046 X-RAY EXAM CHEST 2 VIEWS: CPT

## 2025-08-01 PROCEDURE — 85025 COMPLETE CBC W/AUTO DIFF WBC: CPT

## 2025-08-01 PROCEDURE — 99285 EMERGENCY DEPT VISIT HI MDM: CPT

## 2025-08-01 PROCEDURE — 86780 TREPONEMA PALLIDUM: CPT

## 2025-08-01 PROCEDURE — 81025 URINE PREGNANCY TEST: CPT

## 2025-08-01 PROCEDURE — 2580000003 HC RX 258: Performed by: STUDENT IN AN ORGANIZED HEALTH CARE EDUCATION/TRAINING PROGRAM

## 2025-08-01 PROCEDURE — 87591 N.GONORRHOEAE DNA AMP PROB: CPT

## 2025-08-01 PROCEDURE — 2580000003 HC RX 258: Performed by: NURSE PRACTITIONER

## 2025-08-01 RX ORDER — ENOXAPARIN SODIUM 100 MG/ML
30 INJECTION SUBCUTANEOUS DAILY
Status: DISCONTINUED | OUTPATIENT
Start: 2025-08-01 | End: 2025-08-05 | Stop reason: HOSPADM

## 2025-08-01 RX ORDER — SODIUM CHLORIDE 9 MG/ML
INJECTION, SOLUTION INTRAVENOUS PRN
Status: DISCONTINUED | OUTPATIENT
Start: 2025-08-01 | End: 2025-08-05 | Stop reason: HOSPADM

## 2025-08-01 RX ORDER — POLYETHYLENE GLYCOL 3350 17 G/17G
17 POWDER, FOR SOLUTION ORAL DAILY PRN
Status: DISCONTINUED | OUTPATIENT
Start: 2025-08-01 | End: 2025-08-05 | Stop reason: HOSPADM

## 2025-08-01 RX ORDER — DIAZEPAM 10 MG/2ML
5 INJECTION, SOLUTION INTRAMUSCULAR; INTRAVENOUS ONCE
Status: COMPLETED | OUTPATIENT
Start: 2025-08-01 | End: 2025-08-01

## 2025-08-01 RX ORDER — 0.9 % SODIUM CHLORIDE 0.9 %
1000 INTRAVENOUS SOLUTION INTRAVENOUS ONCE
Status: COMPLETED | OUTPATIENT
Start: 2025-08-01 | End: 2025-08-01

## 2025-08-01 RX ORDER — MAGNESIUM SULFATE IN WATER 40 MG/ML
2000 INJECTION, SOLUTION INTRAVENOUS PRN
Status: DISCONTINUED | OUTPATIENT
Start: 2025-08-01 | End: 2025-08-05 | Stop reason: HOSPADM

## 2025-08-01 RX ORDER — ONDANSETRON 4 MG/1
4 TABLET, ORALLY DISINTEGRATING ORAL EVERY 8 HOURS PRN
Status: DISCONTINUED | OUTPATIENT
Start: 2025-08-01 | End: 2025-08-05 | Stop reason: HOSPADM

## 2025-08-01 RX ORDER — POTASSIUM CHLORIDE 1500 MG/1
40 TABLET, EXTENDED RELEASE ORAL PRN
Status: DISCONTINUED | OUTPATIENT
Start: 2025-08-01 | End: 2025-08-05 | Stop reason: HOSPADM

## 2025-08-01 RX ORDER — SODIUM CHLORIDE 0.9 % (FLUSH) 0.9 %
5-40 SYRINGE (ML) INJECTION PRN
Status: DISCONTINUED | OUTPATIENT
Start: 2025-08-01 | End: 2025-08-05 | Stop reason: HOSPADM

## 2025-08-01 RX ORDER — POTASSIUM CHLORIDE 7.45 MG/ML
10 INJECTION INTRAVENOUS PRN
Status: DISCONTINUED | OUTPATIENT
Start: 2025-08-01 | End: 2025-08-05 | Stop reason: HOSPADM

## 2025-08-01 RX ORDER — MORPHINE SULFATE 4 MG/ML
4 INJECTION, SOLUTION INTRAMUSCULAR; INTRAVENOUS
Refills: 0 | Status: COMPLETED | OUTPATIENT
Start: 2025-08-01 | End: 2025-08-01

## 2025-08-01 RX ORDER — ACETAMINOPHEN 650 MG/1
650 SUPPOSITORY RECTAL EVERY 6 HOURS PRN
Status: DISCONTINUED | OUTPATIENT
Start: 2025-08-01 | End: 2025-08-05 | Stop reason: HOSPADM

## 2025-08-01 RX ORDER — FAMOTIDINE 20 MG/1
20 TABLET, FILM COATED ORAL 2 TIMES DAILY
Status: DISCONTINUED | OUTPATIENT
Start: 2025-08-01 | End: 2025-08-05 | Stop reason: HOSPADM

## 2025-08-01 RX ORDER — ACETAMINOPHEN 325 MG/1
650 TABLET ORAL EVERY 6 HOURS PRN
Status: DISCONTINUED | OUTPATIENT
Start: 2025-08-01 | End: 2025-08-05 | Stop reason: HOSPADM

## 2025-08-01 RX ORDER — ONDANSETRON 2 MG/ML
4 INJECTION INTRAMUSCULAR; INTRAVENOUS EVERY 6 HOURS PRN
Status: DISCONTINUED | OUTPATIENT
Start: 2025-08-01 | End: 2025-08-05 | Stop reason: HOSPADM

## 2025-08-01 RX ORDER — KETOROLAC TROMETHAMINE 15 MG/ML
15 INJECTION, SOLUTION INTRAMUSCULAR; INTRAVENOUS EVERY 6 HOURS PRN
Status: DISCONTINUED | OUTPATIENT
Start: 2025-08-01 | End: 2025-08-05 | Stop reason: HOSPADM

## 2025-08-01 RX ORDER — SODIUM CHLORIDE 0.9 % (FLUSH) 0.9 %
5-40 SYRINGE (ML) INJECTION EVERY 12 HOURS SCHEDULED
Status: DISCONTINUED | OUTPATIENT
Start: 2025-08-01 | End: 2025-08-05 | Stop reason: HOSPADM

## 2025-08-01 RX ADMIN — SODIUM CHLORIDE 1000 ML: 0.9 INJECTION, SOLUTION INTRAVENOUS at 15:19

## 2025-08-01 RX ADMIN — DIAZEPAM 5 MG: 5 INJECTION, SOLUTION INTRAMUSCULAR; INTRAVENOUS at 18:18

## 2025-08-01 RX ADMIN — FAMOTIDINE 20 MG: 20 TABLET, FILM COATED ORAL at 20:42

## 2025-08-01 RX ADMIN — AZITHROMYCIN MONOHYDRATE 500 MG: 500 INJECTION, POWDER, LYOPHILIZED, FOR SOLUTION INTRAVENOUS at 18:24

## 2025-08-01 RX ADMIN — CEFTRIAXONE 1000 MG: 1 INJECTION, POWDER, FOR SOLUTION INTRAMUSCULAR; INTRAVENOUS at 18:15

## 2025-08-01 RX ADMIN — MORPHINE SULFATE 4 MG: 4 INJECTION, SOLUTION INTRAMUSCULAR; INTRAVENOUS at 18:17

## 2025-08-01 RX ADMIN — SODIUM CHLORIDE, PRESERVATIVE FREE 10 ML: 5 INJECTION INTRAVENOUS at 20:42

## 2025-08-01 ASSESSMENT — PAIN DESCRIPTION - LOCATION: LOCATION: BACK

## 2025-08-01 ASSESSMENT — PAIN SCALES - GENERAL
PAINLEVEL_OUTOF10: 8
PAINLEVEL_OUTOF10: 7

## 2025-08-02 LAB
ALBUMIN SERPL-MCNC: 2.5 G/DL (ref 3.4–5)
ALBUMIN/GLOB SERPL: 0.6 (ref 0.8–1.7)
ALP SERPL-CCNC: 94 U/L (ref 45–117)
ALT SERPL-CCNC: 11 U/L (ref 10–35)
ANION GAP SERPL CALC-SCNC: 9 MMOL/L (ref 3–18)
AST SERPL-CCNC: 17 U/L (ref 10–38)
BASOPHILS # BLD: 0.02 K/UL (ref 0–0.1)
BASOPHILS NFR BLD: 0.3 % (ref 0–2)
BILIRUB SERPL-MCNC: 0.3 MG/DL (ref 0.2–1)
BUN SERPL-MCNC: 12 MG/DL (ref 6–23)
BUN/CREAT SERPL: 19 (ref 12–20)
CALCIUM SERPL-MCNC: 8.4 MG/DL (ref 8.5–10.1)
CHLORIDE SERPL-SCNC: 103 MMOL/L (ref 98–107)
CO2 SERPL-SCNC: 24 MMOL/L (ref 21–32)
CREAT SERPL-MCNC: 0.64 MG/DL (ref 0.6–1.3)
DIFFERENTIAL METHOD BLD: ABNORMAL
EOSINOPHIL # BLD: 0.13 K/UL (ref 0–0.4)
EOSINOPHIL NFR BLD: 1.7 % (ref 0–5)
ERYTHROCYTE [DISTWIDTH] IN BLOOD BY AUTOMATED COUNT: 12.7 % (ref 11.6–14.5)
GLOBULIN SER CALC-MCNC: 4.1 G/DL (ref 2–4)
GLUCOSE SERPL-MCNC: 126 MG/DL (ref 74–108)
HCT VFR BLD AUTO: 35.2 % (ref 35–45)
HGB BLD-MCNC: 11.7 G/DL (ref 12–16)
IMM GRANULOCYTES # BLD AUTO: 0.04 K/UL (ref 0–0.04)
IMM GRANULOCYTES NFR BLD AUTO: 0.5 % (ref 0–0.5)
LYMPHOCYTES # BLD: 0.71 K/UL (ref 0.9–3.6)
LYMPHOCYTES NFR BLD: 9.2 % (ref 21–52)
MCH RBC QN AUTO: 31 PG (ref 24–34)
MCHC RBC AUTO-ENTMCNC: 33.2 G/DL (ref 31–37)
MCV RBC AUTO: 93.1 FL (ref 78–100)
MONOCYTES # BLD: 0.14 K/UL (ref 0.05–1.2)
MONOCYTES NFR BLD: 1.8 % (ref 3–10)
NEUTS SEG # BLD: 6.68 K/UL (ref 1.8–8)
NEUTS SEG NFR BLD: 86.5 % (ref 40–73)
NRBC # BLD: 0 K/UL (ref 0–0.01)
NRBC BLD-RTO: 0 PER 100 WBC
PLATELET # BLD AUTO: 224 K/UL (ref 135–420)
PMV BLD AUTO: 9.5 FL (ref 9.2–11.8)
POTASSIUM SERPL-SCNC: 4.3 MMOL/L (ref 3.5–5.5)
PROT SERPL-MCNC: 6.6 G/DL (ref 6.4–8.2)
RBC # BLD AUTO: 3.78 M/UL (ref 4.2–5.3)
SODIUM SERPL-SCNC: 136 MMOL/L (ref 136–145)
WBC # BLD AUTO: 7.7 K/UL (ref 4.6–13.2)

## 2025-08-02 PROCEDURE — 94761 N-INVAS EAR/PLS OXIMETRY MLT: CPT

## 2025-08-02 PROCEDURE — 6360000002 HC RX W HCPCS: Performed by: STUDENT IN AN ORGANIZED HEALTH CARE EDUCATION/TRAINING PROGRAM

## 2025-08-02 PROCEDURE — 80053 COMPREHEN METABOLIC PANEL: CPT

## 2025-08-02 PROCEDURE — 36415 COLL VENOUS BLD VENIPUNCTURE: CPT

## 2025-08-02 PROCEDURE — 6370000000 HC RX 637 (ALT 250 FOR IP): Performed by: STUDENT IN AN ORGANIZED HEALTH CARE EDUCATION/TRAINING PROGRAM

## 2025-08-02 PROCEDURE — 2580000003 HC RX 258: Performed by: STUDENT IN AN ORGANIZED HEALTH CARE EDUCATION/TRAINING PROGRAM

## 2025-08-02 PROCEDURE — 1100000000 HC RM PRIVATE

## 2025-08-02 PROCEDURE — 2500000003 HC RX 250 WO HCPCS: Performed by: STUDENT IN AN ORGANIZED HEALTH CARE EDUCATION/TRAINING PROGRAM

## 2025-08-02 PROCEDURE — 85025 COMPLETE CBC W/AUTO DIFF WBC: CPT

## 2025-08-02 RX ORDER — IPRATROPIUM BROMIDE AND ALBUTEROL SULFATE 2.5; .5 MG/3ML; MG/3ML
1 SOLUTION RESPIRATORY (INHALATION) EVERY 6 HOURS PRN
Status: DISCONTINUED | OUTPATIENT
Start: 2025-08-02 | End: 2025-08-05 | Stop reason: HOSPADM

## 2025-08-02 RX ADMIN — KETOROLAC TROMETHAMINE 15 MG: 15 INJECTION, SOLUTION INTRAMUSCULAR; INTRAVENOUS at 08:40

## 2025-08-02 RX ADMIN — FAMOTIDINE 20 MG: 20 TABLET, FILM COATED ORAL at 08:35

## 2025-08-02 RX ADMIN — FAMOTIDINE 20 MG: 20 TABLET, FILM COATED ORAL at 22:22

## 2025-08-02 RX ADMIN — ENOXAPARIN SODIUM 30 MG: 100 INJECTION SUBCUTANEOUS at 08:36

## 2025-08-02 RX ADMIN — AZITHROMYCIN MONOHYDRATE 500 MG: 500 INJECTION, POWDER, LYOPHILIZED, FOR SOLUTION INTRAVENOUS at 17:10

## 2025-08-02 RX ADMIN — KETOROLAC TROMETHAMINE 15 MG: 15 INJECTION, SOLUTION INTRAMUSCULAR; INTRAVENOUS at 17:06

## 2025-08-02 RX ADMIN — SODIUM CHLORIDE, PRESERVATIVE FREE 10 ML: 5 INJECTION INTRAVENOUS at 22:27

## 2025-08-02 RX ADMIN — SODIUM CHLORIDE, PRESERVATIVE FREE 10 ML: 5 INJECTION INTRAVENOUS at 08:36

## 2025-08-02 RX ADMIN — CEFTRIAXONE 1000 MG: 1 INJECTION, POWDER, FOR SOLUTION INTRAMUSCULAR; INTRAVENOUS at 17:04

## 2025-08-02 RX ADMIN — KETOROLAC TROMETHAMINE 15 MG: 15 INJECTION, SOLUTION INTRAMUSCULAR; INTRAVENOUS at 22:22

## 2025-08-02 ASSESSMENT — PAIN DESCRIPTION - LOCATION
LOCATION: ABDOMEN;SHOULDER
LOCATION: ABDOMEN
LOCATION: BACK

## 2025-08-02 ASSESSMENT — PAIN DESCRIPTION - PAIN TYPE
TYPE: ACUTE PAIN

## 2025-08-02 ASSESSMENT — PAIN DESCRIPTION - ORIENTATION
ORIENTATION: MID;RIGHT
ORIENTATION: MID;UPPER
ORIENTATION: RIGHT

## 2025-08-02 ASSESSMENT — PAIN DESCRIPTION - FREQUENCY
FREQUENCY: INTERMITTENT

## 2025-08-02 ASSESSMENT — PAIN SCALES - GENERAL
PAINLEVEL_OUTOF10: 5
PAINLEVEL_OUTOF10: 8
PAINLEVEL_OUTOF10: 6
PAINLEVEL_OUTOF10: 0
PAINLEVEL_OUTOF10: 0
PAINLEVEL_OUTOF10: 6
PAINLEVEL_OUTOF10: 0

## 2025-08-02 ASSESSMENT — PAIN DESCRIPTION - DESCRIPTORS
DESCRIPTORS: ACHING

## 2025-08-02 ASSESSMENT — PAIN - FUNCTIONAL ASSESSMENT
PAIN_FUNCTIONAL_ASSESSMENT: ACTIVITIES ARE NOT PREVENTED

## 2025-08-02 ASSESSMENT — PAIN DESCRIPTION - ONSET
ONSET: GRADUAL
ONSET: GRADUAL

## 2025-08-03 LAB
ANION GAP SERPL CALC-SCNC: 8 MMOL/L (ref 3–18)
BASOPHILS # BLD: 0.04 K/UL (ref 0–0.1)
BASOPHILS NFR BLD: 0.7 % (ref 0–2)
BUN SERPL-MCNC: 19 MG/DL (ref 6–23)
BUN/CREAT SERPL: 24 (ref 12–20)
CALCIUM SERPL-MCNC: 8.4 MG/DL (ref 8.5–10.1)
CHLORIDE SERPL-SCNC: 101 MMOL/L (ref 98–107)
CO2 SERPL-SCNC: 26 MMOL/L (ref 21–32)
CREAT SERPL-MCNC: 0.82 MG/DL (ref 0.6–1.3)
DIFFERENTIAL METHOD BLD: ABNORMAL
EOSINOPHIL # BLD: 0.27 K/UL (ref 0–0.4)
EOSINOPHIL NFR BLD: 4.9 % (ref 0–5)
ERYTHROCYTE [DISTWIDTH] IN BLOOD BY AUTOMATED COUNT: 12.9 % (ref 11.6–14.5)
GLUCOSE SERPL-MCNC: 108 MG/DL (ref 74–108)
HCT VFR BLD AUTO: 34.8 % (ref 35–45)
HGB BLD-MCNC: 11.4 G/DL (ref 12–16)
IMM GRANULOCYTES # BLD AUTO: 0.01 K/UL (ref 0–0.04)
IMM GRANULOCYTES NFR BLD AUTO: 0.2 % (ref 0–0.5)
LYMPHOCYTES # BLD: 2.48 K/UL (ref 0.9–3.6)
LYMPHOCYTES NFR BLD: 44.7 % (ref 21–52)
MCH RBC QN AUTO: 30.4 PG (ref 24–34)
MCHC RBC AUTO-ENTMCNC: 32.8 G/DL (ref 31–37)
MCV RBC AUTO: 92.8 FL (ref 78–100)
MONOCYTES # BLD: 0.7 K/UL (ref 0.05–1.2)
MONOCYTES NFR BLD: 12.6 % (ref 3–10)
NEUTS SEG # BLD: 2.05 K/UL (ref 1.8–8)
NEUTS SEG NFR BLD: 36.9 % (ref 40–73)
NRBC # BLD: 0 K/UL (ref 0–0.01)
NRBC BLD-RTO: 0 PER 100 WBC
PLATELET # BLD AUTO: 234 K/UL (ref 135–420)
PMV BLD AUTO: 9.4 FL (ref 9.2–11.8)
POTASSIUM SERPL-SCNC: 4.4 MMOL/L (ref 3.5–5.5)
RBC # BLD AUTO: 3.75 M/UL (ref 4.2–5.3)
SODIUM SERPL-SCNC: 135 MMOL/L (ref 136–145)
WBC # BLD AUTO: 5.6 K/UL (ref 4.6–13.2)

## 2025-08-03 PROCEDURE — 85025 COMPLETE CBC W/AUTO DIFF WBC: CPT

## 2025-08-03 PROCEDURE — 80048 BASIC METABOLIC PNL TOTAL CA: CPT

## 2025-08-03 PROCEDURE — 36415 COLL VENOUS BLD VENIPUNCTURE: CPT

## 2025-08-03 PROCEDURE — 6360000002 HC RX W HCPCS: Performed by: STUDENT IN AN ORGANIZED HEALTH CARE EDUCATION/TRAINING PROGRAM

## 2025-08-03 PROCEDURE — 6360000002 HC RX W HCPCS: Performed by: INTERNAL MEDICINE

## 2025-08-03 PROCEDURE — 2580000003 HC RX 258: Performed by: STUDENT IN AN ORGANIZED HEALTH CARE EDUCATION/TRAINING PROGRAM

## 2025-08-03 PROCEDURE — 94761 N-INVAS EAR/PLS OXIMETRY MLT: CPT

## 2025-08-03 PROCEDURE — 2500000003 HC RX 250 WO HCPCS: Performed by: STUDENT IN AN ORGANIZED HEALTH CARE EDUCATION/TRAINING PROGRAM

## 2025-08-03 PROCEDURE — 1100000000 HC RM PRIVATE

## 2025-08-03 PROCEDURE — 6370000000 HC RX 637 (ALT 250 FOR IP): Performed by: STUDENT IN AN ORGANIZED HEALTH CARE EDUCATION/TRAINING PROGRAM

## 2025-08-03 RX ORDER — OXYCODONE AND ACETAMINOPHEN 5; 325 MG/1; MG/1
1 TABLET ORAL EVERY 4 HOURS PRN
Refills: 0 | Status: DISCONTINUED | OUTPATIENT
Start: 2025-08-03 | End: 2025-08-05 | Stop reason: HOSPADM

## 2025-08-03 RX ORDER — MORPHINE SULFATE 2 MG/ML
2 INJECTION, SOLUTION INTRAMUSCULAR; INTRAVENOUS EVERY 4 HOURS PRN
Refills: 0 | Status: DISCONTINUED | OUTPATIENT
Start: 2025-08-03 | End: 2025-08-05 | Stop reason: HOSPADM

## 2025-08-03 RX ORDER — NALOXONE HYDROCHLORIDE 0.4 MG/ML
0.4 INJECTION, SOLUTION INTRAMUSCULAR; INTRAVENOUS; SUBCUTANEOUS PRN
Status: DISCONTINUED | OUTPATIENT
Start: 2025-08-03 | End: 2025-08-05 | Stop reason: HOSPADM

## 2025-08-03 RX ADMIN — MORPHINE SULFATE 2 MG: 2 INJECTION, SOLUTION INTRAMUSCULAR; INTRAVENOUS at 14:35

## 2025-08-03 RX ADMIN — SODIUM CHLORIDE, PRESERVATIVE FREE 10 ML: 5 INJECTION INTRAVENOUS at 21:00

## 2025-08-03 RX ADMIN — ENOXAPARIN SODIUM 30 MG: 100 INJECTION SUBCUTANEOUS at 08:23

## 2025-08-03 RX ADMIN — KETOROLAC TROMETHAMINE 15 MG: 15 INJECTION, SOLUTION INTRAMUSCULAR; INTRAVENOUS at 08:22

## 2025-08-03 RX ADMIN — ONDANSETRON 4 MG: 2 INJECTION, SOLUTION INTRAMUSCULAR; INTRAVENOUS at 17:15

## 2025-08-03 RX ADMIN — MORPHINE SULFATE 2 MG: 2 INJECTION, SOLUTION INTRAMUSCULAR; INTRAVENOUS at 20:05

## 2025-08-03 RX ADMIN — SODIUM CHLORIDE, PRESERVATIVE FREE 10 ML: 5 INJECTION INTRAVENOUS at 08:24

## 2025-08-03 RX ADMIN — MORPHINE SULFATE 2 MG: 2 INJECTION, SOLUTION INTRAMUSCULAR; INTRAVENOUS at 00:45

## 2025-08-03 RX ADMIN — MORPHINE SULFATE 2 MG: 2 INJECTION, SOLUTION INTRAMUSCULAR; INTRAVENOUS at 10:02

## 2025-08-03 RX ADMIN — FAMOTIDINE 20 MG: 20 TABLET, FILM COATED ORAL at 20:05

## 2025-08-03 RX ADMIN — FAMOTIDINE 20 MG: 20 TABLET, FILM COATED ORAL at 08:23

## 2025-08-03 RX ADMIN — CEFTRIAXONE 1000 MG: 1 INJECTION, POWDER, FOR SOLUTION INTRAMUSCULAR; INTRAVENOUS at 16:27

## 2025-08-03 RX ADMIN — AZITHROMYCIN MONOHYDRATE 500 MG: 500 INJECTION, POWDER, LYOPHILIZED, FOR SOLUTION INTRAVENOUS at 16:30

## 2025-08-03 ASSESSMENT — PAIN DESCRIPTION - DESCRIPTORS
DESCRIPTORS: SHARP
DESCRIPTORS: ACHING
DESCRIPTORS: ACHING
DESCRIPTORS: SHARP

## 2025-08-03 ASSESSMENT — PAIN DESCRIPTION - ONSET
ONSET: GRADUAL

## 2025-08-03 ASSESSMENT — PAIN DESCRIPTION - DIRECTION: RADIATING_TOWARDS: DENIES

## 2025-08-03 ASSESSMENT — PAIN SCALES - GENERAL
PAINLEVEL_OUTOF10: 0
PAINLEVEL_OUTOF10: 9
PAINLEVEL_OUTOF10: 0
PAINLEVEL_OUTOF10: 0
PAINLEVEL_OUTOF10: 5
PAINLEVEL_OUTOF10: 10
PAINLEVEL_OUTOF10: 8
PAINLEVEL_OUTOF10: 3
PAINLEVEL_OUTOF10: 7
PAINLEVEL_OUTOF10: 0
PAINLEVEL_OUTOF10: 10
PAINLEVEL_OUTOF10: 0

## 2025-08-03 ASSESSMENT — PAIN DESCRIPTION - LOCATION
LOCATION: ABDOMEN
LOCATION: ABDOMEN;SHOULDER

## 2025-08-03 ASSESSMENT — PAIN DESCRIPTION - ORIENTATION
ORIENTATION: RIGHT
ORIENTATION: RIGHT;UPPER;LOWER
ORIENTATION: RIGHT;UPPER;LOWER
ORIENTATION: RIGHT;LOWER
ORIENTATION: RIGHT;LOWER

## 2025-08-03 ASSESSMENT — PAIN DESCRIPTION - FREQUENCY
FREQUENCY: INTERMITTENT

## 2025-08-03 ASSESSMENT — PAIN - FUNCTIONAL ASSESSMENT
PAIN_FUNCTIONAL_ASSESSMENT: ACTIVITIES ARE NOT PREVENTED

## 2025-08-03 ASSESSMENT — PAIN DESCRIPTION - PAIN TYPE
TYPE: ACUTE PAIN
TYPE: ACUTE PAIN
TYPE: CHRONIC PAIN
TYPE: ACUTE PAIN

## 2025-08-04 ENCOUNTER — HOSPITAL ENCOUNTER (INPATIENT)
Facility: HOSPITAL | Age: 35
Discharge: HOME | DRG: 724 | End: 2025-08-07
Payer: MEDICAID

## 2025-08-04 PROBLEM — A28.1 CAT-SCRATCH DISEASE: Status: ACTIVE | Noted: 2025-08-04

## 2025-08-04 LAB
ANION GAP SERPL CALC-SCNC: 6 MMOL/L (ref 3–18)
BASOPHILS # BLD: 0.04 K/UL (ref 0–0.1)
BASOPHILS NFR BLD: 0.6 % (ref 0–2)
BUN SERPL-MCNC: 20 MG/DL (ref 6–23)
BUN/CREAT SERPL: 30 (ref 12–20)
C TRACH RRNA SPEC QL NAA+PROBE: NEGATIVE
CALCIUM SERPL-MCNC: 8.5 MG/DL (ref 8.5–10.1)
CHLORIDE SERPL-SCNC: 100 MMOL/L (ref 98–107)
CO2 SERPL-SCNC: 27 MMOL/L (ref 21–32)
CREAT SERPL-MCNC: 0.68 MG/DL (ref 0.6–1.3)
DIFFERENTIAL METHOD BLD: ABNORMAL
EOSINOPHIL # BLD: 0.23 K/UL (ref 0–0.4)
EOSINOPHIL NFR BLD: 3.4 % (ref 0–5)
ERYTHROCYTE [DISTWIDTH] IN BLOOD BY AUTOMATED COUNT: 12.9 % (ref 11.6–14.5)
GLUCOSE SERPL-MCNC: 95 MG/DL (ref 74–108)
HCT VFR BLD AUTO: 33.4 % (ref 35–45)
HGB BLD-MCNC: 10.7 G/DL (ref 12–16)
HIV 1+2 AB+HIV1 P24 AG SERPL QL IA: NONREACTIVE
HIV 1/2 RESULT COMMENT: NORMAL
IMM GRANULOCYTES # BLD AUTO: 0.01 K/UL (ref 0–0.04)
IMM GRANULOCYTES NFR BLD AUTO: 0.1 % (ref 0–0.5)
LYMPHOCYTES # BLD: 2.84 K/UL (ref 0.9–3.6)
LYMPHOCYTES NFR BLD: 42.2 % (ref 21–52)
MCH RBC QN AUTO: 30 PG (ref 24–34)
MCHC RBC AUTO-ENTMCNC: 32 G/DL (ref 31–37)
MCV RBC AUTO: 93.6 FL (ref 78–100)
MONOCYTES # BLD: 0.62 K/UL (ref 0.05–1.2)
MONOCYTES NFR BLD: 9.2 % (ref 3–10)
N GONORRHOEA RRNA SPEC QL NAA+PROBE: NEGATIVE
NEUTS SEG # BLD: 2.99 K/UL (ref 1.8–8)
NEUTS SEG NFR BLD: 44.5 % (ref 40–73)
NRBC # BLD: 0 K/UL (ref 0–0.01)
NRBC BLD-RTO: 0 PER 100 WBC
PLATELET # BLD AUTO: 245 K/UL (ref 135–420)
PMV BLD AUTO: 9.4 FL (ref 9.2–11.8)
POTASSIUM SERPL-SCNC: 3.9 MMOL/L (ref 3.5–5.5)
RBC # BLD AUTO: 3.57 M/UL (ref 4.2–5.3)
SODIUM SERPL-SCNC: 134 MMOL/L (ref 136–145)
SPECIMEN SOURCE: NORMAL
WBC # BLD AUTO: 6.7 K/UL (ref 4.6–13.2)

## 2025-08-04 PROCEDURE — 2500000003 HC RX 250 WO HCPCS: Performed by: STUDENT IN AN ORGANIZED HEALTH CARE EDUCATION/TRAINING PROGRAM

## 2025-08-04 PROCEDURE — 80048 BASIC METABOLIC PNL TOTAL CA: CPT

## 2025-08-04 PROCEDURE — 94761 N-INVAS EAR/PLS OXIMETRY MLT: CPT

## 2025-08-04 PROCEDURE — 2580000003 HC RX 258: Performed by: STUDENT IN AN ORGANIZED HEALTH CARE EDUCATION/TRAINING PROGRAM

## 2025-08-04 PROCEDURE — 1100000000 HC RM PRIVATE

## 2025-08-04 PROCEDURE — 6360000002 HC RX W HCPCS: Performed by: STUDENT IN AN ORGANIZED HEALTH CARE EDUCATION/TRAINING PROGRAM

## 2025-08-04 PROCEDURE — 6370000000 HC RX 637 (ALT 250 FOR IP): Performed by: STUDENT IN AN ORGANIZED HEALTH CARE EDUCATION/TRAINING PROGRAM

## 2025-08-04 PROCEDURE — 36415 COLL VENOUS BLD VENIPUNCTURE: CPT

## 2025-08-04 PROCEDURE — 6360000004 HC RX CONTRAST MEDICATION: Performed by: INTERNAL MEDICINE

## 2025-08-04 PROCEDURE — 6370000000 HC RX 637 (ALT 250 FOR IP): Performed by: INTERNAL MEDICINE

## 2025-08-04 PROCEDURE — 6360000002 HC RX W HCPCS: Performed by: INTERNAL MEDICINE

## 2025-08-04 PROCEDURE — 85025 COMPLETE CBC W/AUTO DIFF WBC: CPT

## 2025-08-04 PROCEDURE — 74177 CT ABD & PELVIS W/CONTRAST: CPT

## 2025-08-04 RX ORDER — LACTOBACILLUS RHAMNOSUS GG 10B CELL
1 CAPSULE ORAL 2 TIMES DAILY WITH MEALS
Status: DISCONTINUED | OUTPATIENT
Start: 2025-08-04 | End: 2025-08-05 | Stop reason: HOSPADM

## 2025-08-04 RX ORDER — IOPAMIDOL 612 MG/ML
100 INJECTION, SOLUTION INTRAVASCULAR
Status: COMPLETED | OUTPATIENT
Start: 2025-08-04 | End: 2025-08-04

## 2025-08-04 RX ADMIN — OXYCODONE HYDROCHLORIDE AND ACETAMINOPHEN 1 TABLET: 5; 325 TABLET ORAL at 09:23

## 2025-08-04 RX ADMIN — FAMOTIDINE 20 MG: 20 TABLET, FILM COATED ORAL at 09:17

## 2025-08-04 RX ADMIN — SODIUM CHLORIDE, PRESERVATIVE FREE 10 ML: 5 INJECTION INTRAVENOUS at 19:19

## 2025-08-04 RX ADMIN — Medication 1 CAPSULE: at 16:54

## 2025-08-04 RX ADMIN — OXYCODONE HYDROCHLORIDE AND ACETAMINOPHEN 1 TABLET: 5; 325 TABLET ORAL at 00:45

## 2025-08-04 RX ADMIN — ONDANSETRON 4 MG: 2 INJECTION, SOLUTION INTRAMUSCULAR; INTRAVENOUS at 09:22

## 2025-08-04 RX ADMIN — ONDANSETRON 4 MG: 4 TABLET, ORALLY DISINTEGRATING ORAL at 16:44

## 2025-08-04 RX ADMIN — IOPAMIDOL 100 ML: 612 INJECTION, SOLUTION INTRAVENOUS at 15:43

## 2025-08-04 RX ADMIN — SODIUM CHLORIDE, PRESERVATIVE FREE 10 ML: 5 INJECTION INTRAVENOUS at 09:21

## 2025-08-04 RX ADMIN — Medication 1 CAPSULE: at 09:17

## 2025-08-04 RX ADMIN — AZITHROMYCIN MONOHYDRATE 500 MG: 500 INJECTION, POWDER, LYOPHILIZED, FOR SOLUTION INTRAVENOUS at 16:54

## 2025-08-04 RX ADMIN — MORPHINE SULFATE 2 MG: 2 INJECTION, SOLUTION INTRAMUSCULAR; INTRAVENOUS at 19:14

## 2025-08-04 RX ADMIN — FAMOTIDINE 20 MG: 20 TABLET, FILM COATED ORAL at 22:17

## 2025-08-04 RX ADMIN — ENOXAPARIN SODIUM 30 MG: 100 INJECTION SUBCUTANEOUS at 09:16

## 2025-08-04 ASSESSMENT — PAIN - FUNCTIONAL ASSESSMENT
PAIN_FUNCTIONAL_ASSESSMENT: ACTIVITIES ARE NOT PREVENTED
PAIN_FUNCTIONAL_ASSESSMENT: ACTIVITIES ARE NOT PREVENTED
PAIN_FUNCTIONAL_ASSESSMENT: PREVENTS OR INTERFERES SOME ACTIVE ACTIVITIES AND ADLS

## 2025-08-04 ASSESSMENT — PAIN DESCRIPTION - DESCRIPTORS
DESCRIPTORS: SHARP
DESCRIPTORS: ACHING
DESCRIPTORS: ACHING;SHARP;STABBING

## 2025-08-04 ASSESSMENT — PAIN SCALES - GENERAL
PAINLEVEL_OUTOF10: 9
PAINLEVEL_OUTOF10: 0
PAINLEVEL_OUTOF10: 9
PAINLEVEL_OUTOF10: 0
PAINLEVEL_OUTOF10: 9
PAINLEVEL_OUTOF10: 0

## 2025-08-04 ASSESSMENT — PAIN DESCRIPTION - FREQUENCY
FREQUENCY: INTERMITTENT
FREQUENCY: INTERMITTENT

## 2025-08-04 ASSESSMENT — PAIN DESCRIPTION - PAIN TYPE
TYPE: ACUTE PAIN
TYPE: ACUTE PAIN

## 2025-08-04 ASSESSMENT — PAIN DESCRIPTION - ORIENTATION
ORIENTATION: LOWER;LEFT
ORIENTATION: RIGHT
ORIENTATION: RIGHT

## 2025-08-04 ASSESSMENT — PAIN DESCRIPTION - ONSET
ONSET: ON-GOING
ONSET: GRADUAL

## 2025-08-04 ASSESSMENT — PAIN DESCRIPTION - LOCATION
LOCATION: ABDOMEN
LOCATION: ABDOMEN;FLANK
LOCATION: ABDOMEN;FLANK;RIB CAGE

## 2025-08-05 VITALS
DIASTOLIC BLOOD PRESSURE: 58 MMHG | RESPIRATION RATE: 18 BRPM | WEIGHT: 96.12 LBS | SYSTOLIC BLOOD PRESSURE: 103 MMHG | TEMPERATURE: 98.6 F | OXYGEN SATURATION: 97 % | HEART RATE: 63 BPM | HEIGHT: 56 IN | BODY MASS INDEX: 21.62 KG/M2

## 2025-08-05 PROBLEM — F14.10 COCAINE ABUSE (HCC): Status: ACTIVE | Noted: 2025-08-05

## 2025-08-05 PROBLEM — F12.10 MARIJUANA ABUSE: Status: ACTIVE | Noted: 2025-08-05

## 2025-08-05 PROBLEM — K59.00 CONSTIPATION: Status: ACTIVE | Noted: 2025-08-05

## 2025-08-05 LAB
ANION GAP SERPL CALC-SCNC: 9 MMOL/L (ref 3–18)
BASOPHILS # BLD: 0.03 K/UL (ref 0–0.1)
BASOPHILS NFR BLD: 0.5 % (ref 0–2)
BUN SERPL-MCNC: 18 MG/DL (ref 6–23)
BUN/CREAT SERPL: 23 (ref 12–20)
CALCIUM SERPL-MCNC: 8.5 MG/DL (ref 8.5–10.1)
CHLORIDE SERPL-SCNC: 100 MMOL/L (ref 98–107)
CO2 SERPL-SCNC: 27 MMOL/L (ref 21–32)
CREAT SERPL-MCNC: 0.76 MG/DL (ref 0.6–1.3)
DIFFERENTIAL METHOD BLD: ABNORMAL
EOSINOPHIL # BLD: 0.33 K/UL (ref 0–0.4)
EOSINOPHIL NFR BLD: 5 % (ref 0–5)
ERYTHROCYTE [DISTWIDTH] IN BLOOD BY AUTOMATED COUNT: 12.9 % (ref 11.6–14.5)
GLUCOSE SERPL-MCNC: 103 MG/DL (ref 74–108)
HCT VFR BLD AUTO: 36.5 % (ref 35–45)
HGB BLD-MCNC: 11.7 G/DL (ref 12–16)
IMM GRANULOCYTES # BLD AUTO: 0.02 K/UL (ref 0–0.04)
IMM GRANULOCYTES NFR BLD AUTO: 0.3 % (ref 0–0.5)
LYMPHOCYTES # BLD: 2.6 K/UL (ref 0.9–3.6)
LYMPHOCYTES NFR BLD: 39.5 % (ref 21–52)
MCH RBC QN AUTO: 30.2 PG (ref 24–34)
MCHC RBC AUTO-ENTMCNC: 32.1 G/DL (ref 31–37)
MCV RBC AUTO: 94.3 FL (ref 78–100)
MONOCYTES # BLD: 0.59 K/UL (ref 0.05–1.2)
MONOCYTES NFR BLD: 9 % (ref 3–10)
NEUTS SEG # BLD: 3.02 K/UL (ref 1.8–8)
NEUTS SEG NFR BLD: 45.7 % (ref 40–73)
NRBC # BLD: 0 K/UL (ref 0–0.01)
NRBC BLD-RTO: 0 PER 100 WBC
PLATELET # BLD AUTO: 276 K/UL (ref 135–420)
PMV BLD AUTO: 9.4 FL (ref 9.2–11.8)
POTASSIUM SERPL-SCNC: 4.1 MMOL/L (ref 3.5–5.5)
RBC # BLD AUTO: 3.87 M/UL (ref 4.2–5.3)
RPR SER QL: ABNORMAL
SODIUM SERPL-SCNC: 135 MMOL/L (ref 136–145)
T PALLIDUM AB SER QL IA: REACTIVE
WBC # BLD AUTO: 6.6 K/UL (ref 4.6–13.2)

## 2025-08-05 PROCEDURE — 6370000000 HC RX 637 (ALT 250 FOR IP): Performed by: INTERNAL MEDICINE

## 2025-08-05 PROCEDURE — 85025 COMPLETE CBC W/AUTO DIFF WBC: CPT

## 2025-08-05 PROCEDURE — 6370000000 HC RX 637 (ALT 250 FOR IP): Performed by: STUDENT IN AN ORGANIZED HEALTH CARE EDUCATION/TRAINING PROGRAM

## 2025-08-05 PROCEDURE — 94761 N-INVAS EAR/PLS OXIMETRY MLT: CPT

## 2025-08-05 PROCEDURE — 99239 HOSP IP/OBS DSCHRG MGMT >30: CPT | Performed by: INTERNAL MEDICINE

## 2025-08-05 PROCEDURE — 2580000003 HC RX 258: Performed by: STUDENT IN AN ORGANIZED HEALTH CARE EDUCATION/TRAINING PROGRAM

## 2025-08-05 PROCEDURE — 6360000002 HC RX W HCPCS: Performed by: FAMILY MEDICINE

## 2025-08-05 PROCEDURE — 36415 COLL VENOUS BLD VENIPUNCTURE: CPT

## 2025-08-05 PROCEDURE — 2500000003 HC RX 250 WO HCPCS: Performed by: STUDENT IN AN ORGANIZED HEALTH CARE EDUCATION/TRAINING PROGRAM

## 2025-08-05 PROCEDURE — 80048 BASIC METABOLIC PNL TOTAL CA: CPT

## 2025-08-05 PROCEDURE — 6360000002 HC RX W HCPCS: Performed by: INTERNAL MEDICINE

## 2025-08-05 PROCEDURE — 6360000002 HC RX W HCPCS: Performed by: STUDENT IN AN ORGANIZED HEALTH CARE EDUCATION/TRAINING PROGRAM

## 2025-08-05 RX ORDER — AZITHROMYCIN 500 MG/1
500 TABLET, FILM COATED ORAL DAILY
Qty: 2 TABLET | Refills: 0 | Status: SHIPPED | OUTPATIENT
Start: 2025-08-05 | End: 2025-08-07

## 2025-08-05 RX ORDER — POLYETHYLENE GLYCOL 3350 17 G/17G
17 POWDER, FOR SOLUTION ORAL DAILY
Status: DISCONTINUED | OUTPATIENT
Start: 2025-08-05 | End: 2025-08-05 | Stop reason: HOSPADM

## 2025-08-05 RX ORDER — BISACODYL 10 MG
10 SUPPOSITORY, RECTAL RECTAL DAILY
Status: DISCONTINUED | OUTPATIENT
Start: 2025-08-05 | End: 2025-08-05 | Stop reason: HOSPADM

## 2025-08-05 RX ORDER — DOCUSATE SODIUM 100 MG/1
100 CAPSULE, LIQUID FILLED ORAL 2 TIMES DAILY
Qty: 60 CAPSULE | Refills: 0 | Status: SHIPPED | OUTPATIENT
Start: 2025-08-05 | End: 2025-09-04

## 2025-08-05 RX ADMIN — FAMOTIDINE 20 MG: 20 TABLET, FILM COATED ORAL at 10:04

## 2025-08-05 RX ADMIN — Medication 1 CAPSULE: at 17:09

## 2025-08-05 RX ADMIN — MORPHINE SULFATE 2 MG: 2 INJECTION, SOLUTION INTRAMUSCULAR; INTRAVENOUS at 05:29

## 2025-08-05 RX ADMIN — MORPHINE SULFATE 2 MG: 2 INJECTION, SOLUTION INTRAMUSCULAR; INTRAVENOUS at 14:18

## 2025-08-05 RX ADMIN — SODIUM CHLORIDE, PRESERVATIVE FREE 10 ML: 5 INJECTION INTRAVENOUS at 10:12

## 2025-08-05 RX ADMIN — ONDANSETRON 4 MG: 4 TABLET, ORALLY DISINTEGRATING ORAL at 17:21

## 2025-08-05 RX ADMIN — ENOXAPARIN SODIUM 30 MG: 100 INJECTION SUBCUTANEOUS at 10:04

## 2025-08-05 RX ADMIN — MORPHINE SULFATE 2 MG: 2 INJECTION, SOLUTION INTRAMUSCULAR; INTRAVENOUS at 01:26

## 2025-08-05 RX ADMIN — AZITHROMYCIN MONOHYDRATE 500 MG: 500 INJECTION, POWDER, LYOPHILIZED, FOR SOLUTION INTRAVENOUS at 17:12

## 2025-08-05 RX ADMIN — PENICILLIN G BENZATHINE 2.4 MILLION UNITS: 2400000 INJECTION, SUSPENSION INTRAMUSCULAR at 01:31

## 2025-08-05 RX ADMIN — Medication 1 CAPSULE: at 10:04

## 2025-08-05 RX ADMIN — KETOROLAC TROMETHAMINE 15 MG: 15 INJECTION, SOLUTION INTRAMUSCULAR; INTRAVENOUS at 10:11

## 2025-08-05 RX ADMIN — POLYETHYLENE GLYCOL 3350 17 G: 17 POWDER, FOR SOLUTION ORAL at 10:11

## 2025-08-05 ASSESSMENT — PAIN SCALES - GENERAL
PAINLEVEL_OUTOF10: 3
PAINLEVEL_OUTOF10: 0
PAINLEVEL_OUTOF10: 10
PAINLEVEL_OUTOF10: 0
PAINLEVEL_OUTOF10: 9
PAINLEVEL_OUTOF10: 0
PAINLEVEL_OUTOF10: 3
PAINLEVEL_OUTOF10: 10
PAINLEVEL_OUTOF10: 10

## 2025-08-05 ASSESSMENT — PAIN DESCRIPTION - DIRECTION
RADIATING_TOWARDS: DENIES
RADIATING_TOWARDS: DENIES

## 2025-08-05 ASSESSMENT — PAIN DESCRIPTION - ONSET
ONSET: AWAKENED FROM SLEEP
ONSET: AWAKENED FROM SLEEP

## 2025-08-05 ASSESSMENT — PAIN DESCRIPTION - ORIENTATION
ORIENTATION: RIGHT

## 2025-08-05 ASSESSMENT — PAIN DESCRIPTION - DESCRIPTORS
DESCRIPTORS: ACHING
DESCRIPTORS: ACHING;SHARP
DESCRIPTORS: ACHING;SHARP;STABBING
DESCRIPTORS: ACHING;SHARP;STABBING

## 2025-08-05 ASSESSMENT — PAIN - FUNCTIONAL ASSESSMENT
PAIN_FUNCTIONAL_ASSESSMENT: ACTIVITIES ARE NOT PREVENTED
PAIN_FUNCTIONAL_ASSESSMENT: PREVENTS OR INTERFERES SOME ACTIVE ACTIVITIES AND ADLS
PAIN_FUNCTIONAL_ASSESSMENT: ACTIVITIES ARE NOT PREVENTED
PAIN_FUNCTIONAL_ASSESSMENT: PREVENTS OR INTERFERES SOME ACTIVE ACTIVITIES AND ADLS

## 2025-08-05 ASSESSMENT — PAIN DESCRIPTION - LOCATION
LOCATION: ABDOMEN
LOCATION: ABDOMEN;FLANK;RIB CAGE
LOCATION: ABDOMEN;FLANK;RIB CAGE
LOCATION: ABDOMEN

## 2025-08-05 ASSESSMENT — PAIN DESCRIPTION - FREQUENCY
FREQUENCY: INTERMITTENT
FREQUENCY: INTERMITTENT

## 2025-08-05 ASSESSMENT — PAIN DESCRIPTION - PAIN TYPE
TYPE: ACUTE PAIN
TYPE: ACUTE PAIN

## 2025-08-07 LAB
BACTERIA SPEC CULT: NORMAL
BACTERIA SPEC CULT: NORMAL
SERVICE CMNT-IMP: NORMAL
SERVICE CMNT-IMP: NORMAL

## (undated) DEVICE — GAUZE SPONGES,16 PLY: Brand: CURITY

## (undated) DEVICE — CATHETER SUCT TR FL TIP 14FR W/ O CTRL

## (undated) DEVICE — SOLUTION IRRIG 1000ML H2O STRL BLT

## (undated) DEVICE — FLEX ADVANTAGE 3000CC: Brand: FLEX ADVANTAGE

## (undated) DEVICE — BITE BLOCK ENDOSCP UNIV AD 6 TO 9.4 MM

## (undated) DEVICE — (D)GLOVE EXAM LG NITRL NS -- DISC BY MFR NO SUB

## (undated) DEVICE — BASIN EMESIS 500CC ROSE 250/CS 60/PLT: Brand: MEDEGEN MEDICAL PRODUCTS, LLC

## (undated) DEVICE — FCPS RAD JAW 4LC 240CM W/NDL -- BX/20 RADIAL JAW 4

## (undated) DEVICE — MEDI-VAC NON-CONDUCTIVE SUCTION TUBING: Brand: CARDINAL HEALTH

## (undated) DEVICE — SYR 50ML SLIP TIP NSAF LF STRL --

## (undated) DEVICE — MEDI-VAC SUCTION HIGH CAPACITY: Brand: CARDINAL HEALTH

## (undated) DEVICE — AIRLIFE™ NASAL OXYGEN CANNULA CURVED, FLARED TIP WITH 14 FOOT (4.3 M) CRUSH-RESISTANT TUBING, OVER-THE-EAR STYLE: Brand: AIRLIFE™

## (undated) DEVICE — ENDOSCOPY PUMP TUBING/ CAP SET: Brand: ERBE

## (undated) DEVICE — FLUFF AND POLYMER UNDERPAD,EXTRA HEAVY: Brand: WINGS

## (undated) DEVICE — SYRINGE MED 25GA 3ML L5/8IN SUBQ PLAS W/ DETACH NDL SFTY